# Patient Record
Sex: MALE | Race: OTHER | Employment: OTHER | URBAN - METROPOLITAN AREA
[De-identification: names, ages, dates, MRNs, and addresses within clinical notes are randomized per-mention and may not be internally consistent; named-entity substitution may affect disease eponyms.]

---

## 2018-11-20 ENCOUNTER — NURSE NAVIGATOR (OUTPATIENT)
Dept: FAMILY MEDICINE CLINIC | Age: 83
End: 2018-11-20

## 2018-11-20 DIAGNOSIS — M08.00 JUVENILE RHEUMATOID ARTHRITIS (HCC): Primary | ICD-10-CM

## 2018-11-20 DIAGNOSIS — S12.601S CLOSED NONDISPLACED FRACTURE OF SEVENTH CERVICAL VERTEBRA, UNSPECIFIED FRACTURE MORPHOLOGY, SEQUELA: ICD-10-CM

## 2018-11-20 DIAGNOSIS — J69.0 ASPIRATION PNEUMONIA DUE TO REGURGITATED FOOD, UNSPECIFIED LATERALITY, UNSPECIFIED PART OF LUNG (HCC): ICD-10-CM

## 2018-11-26 ENCOUNTER — NURSE NAVIGATOR (OUTPATIENT)
Dept: FAMILY MEDICINE CLINIC | Age: 83
End: 2018-11-26
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
## 2018-11-26 NOTE — PROGRESS NOTES
Home Based Primary Care & Supportive Services   (previously: At Home)  [phone] 4101 [facility] [place] 6, [address]    Pt's cousin and caregiver Vern Archer called asking when Yany Ratliff will be able to visit Mr. Roslyn Galicia. Pt was discharged from [facility] on 11/20/18 and was stable at that time. Ms. Regino Rodríguez says that over the weekend, pt has experienced urinary urgency. They called [facility] over the weekend and Alejandrina Fuentes is going to send a home health nurse to pt's home today. This nurse explained that Yany Ratliff team will be contacting her sometime this week to schedule pt's first Yany Ratliff visit. Nurse also explained [facility] services if pt needs to be seen before [facility] can schedule first home visit. Ms. Regino Rodríguez voices understanding.     Shereen Li, RN  Referral Navigator, Home Based Primary Care & Supportive Services

## 2018-11-27 ENCOUNTER — NURSE NAVIGATOR (OUTPATIENT)
Dept: PALLATIVE CARE | Age: 83
End: 2018-11-27

## 2018-11-27 NOTE — PROGRESS NOTES
Home Based Primary Care & Supportive Services   (previously: At Home)  [phone] 4101 [facility] 6, 0664 [address]    Received incoming call from [facility] at [facility]. She says she received home health orders from Dr. Deon Edwards to see Mr. Raisa Gates. She does not know what practice he works with, but she thinks that Dr. Ray Oglesby is going to be following Mr. Raisa Gates at home and therefore HBPC would be a duplication of providers. She gave this nurse Dr. Fleming Route contact number. [phone]. Nurse called Dr. Ray Oglesby and left message for him to call our office to clarify. Nurse called pt's caregiver and cousin Adi Delcid. She says Dr. Ray Oglesby made a home visit on 11/23/18 and he is the physician that ordered home health. She is not sure if he works for an agency or if he is in private practice. Nurse explained that she has left a message with Dr. Ray Oglesby to clarify if he plans to make home visits and function as pt's PCP. If so, HBPC will not be needed.     Juliano Aguilar, RN  Referral Navigator, Home Based Primary Care & Supportive Services
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
## 2018-12-03 ENCOUNTER — HOME HEALTH ADMISSION (OUTPATIENT)
Dept: HOME HEALTH SERVICES | Facility: HOME HEALTH | Age: 83
End: 2018-12-03
Payer: MEDICARE

## 2018-12-03 ENCOUNTER — TELEPHONE (OUTPATIENT)
Dept: FAMILY MEDICINE CLINIC | Age: 83
End: 2018-12-03

## 2018-12-03 ENCOUNTER — OFFICE VISIT (OUTPATIENT)
Dept: FAMILY MEDICINE CLINIC | Age: 83
End: 2018-12-03

## 2018-12-03 VITALS
SYSTOLIC BLOOD PRESSURE: 131 MMHG | HEART RATE: 77 BPM | RESPIRATION RATE: 16 BRPM | DIASTOLIC BLOOD PRESSURE: 76 MMHG | TEMPERATURE: 97.3 F | OXYGEN SATURATION: 96 %

## 2018-12-03 DIAGNOSIS — K59.00 CONSTIPATION, UNSPECIFIED CONSTIPATION TYPE: ICD-10-CM

## 2018-12-03 DIAGNOSIS — F32.A DEPRESSION, UNSPECIFIED DEPRESSION TYPE: ICD-10-CM

## 2018-12-03 DIAGNOSIS — R63.4 WEIGHT LOSS: ICD-10-CM

## 2018-12-03 DIAGNOSIS — M19.90 ARTHRITIS PAIN: ICD-10-CM

## 2018-12-03 DIAGNOSIS — M08.00 JUVENILE RHEUMATOID ARTHRITIS (HCC): Primary | ICD-10-CM

## 2018-12-03 PROBLEM — M06.9 RHEUMATOID ARTHRITIS INVOLVING MULTIPLE SITES (HCC): Status: ACTIVE | Noted: 2018-12-03

## 2018-12-03 RX ORDER — OXYCODONE HYDROCHLORIDE 10 MG/1
10 TABLET ORAL
COMMUNITY
End: 2018-12-13 | Stop reason: SDUPTHER

## 2018-12-03 RX ORDER — CYCLOBENZAPRINE HCL 5 MG
5 TABLET ORAL
COMMUNITY
End: 2018-12-13 | Stop reason: SDUPTHER

## 2018-12-03 RX ORDER — SENNOSIDES 8.6 MG/1
1 TABLET ORAL 2 TIMES DAILY
COMMUNITY
End: 2018-12-31 | Stop reason: SDUPTHER

## 2018-12-03 RX ORDER — POLYETHYLENE GLYCOL 3350 17 G/17G
17 POWDER, FOR SOLUTION ORAL DAILY
COMMUNITY

## 2018-12-03 RX ORDER — FACIAL-BODY WIPES
10 EACH TOPICAL
COMMUNITY

## 2018-12-03 RX ORDER — DULOXETIN HYDROCHLORIDE 30 MG/1
30 CAPSULE, DELAYED RELEASE ORAL DAILY
COMMUNITY
End: 2018-12-13 | Stop reason: SDUPTHER

## 2018-12-03 RX ORDER — MORPHINE SULFATE 15 MG/1
TABLET, FILM COATED, EXTENDED RELEASE ORAL EVERY 12 HOURS
COMMUNITY
End: 2018-12-13 | Stop reason: SDUPTHER

## 2018-12-03 RX ORDER — FINASTERIDE 5 MG/1
5 TABLET, FILM COATED ORAL DAILY
COMMUNITY
End: 2018-12-13 | Stop reason: SDUPTHER

## 2018-12-03 NOTE — ACP (ADVANCE CARE PLANNING)
Advance Care Planning (ACP) Provider Conversation Snapshot    Date of ACP Conversation: 12/03/18  Persons included in Conversation:  patient and POA  Length of ACP Conversation in minutes:  16 minutes    Authorized Decision Maker (if patient is incapable of making informed decisions):    This person is:   Healthcare Agent/Medical Power of  under Advance Directive          For Patients with Decision Making Capacity:   Values/Goals: Exploration of values, goals, and preferences if recovery is not expected, even with continued medical treatment in the event of:  Imminent death    Conversation Outcomes / Follow-Up Plan:   Entered DNR order (If yes, complete Durable DNR form)        Reviewed Advance Directive  Reviewed Durable DNR and completed 2 copies  Verified primary decision maker

## 2018-12-03 NOTE — TELEPHONE ENCOUNTER
Caller requesting a return call regarding pill box and the pills that are in there.  She thinks the pills might be wrong

## 2018-12-03 NOTE — PROGRESS NOTES
Oxy IR 10mg filled on 11/23/18 = 13 tabs MS contin 15mg filled on 11/23/18 = 36 tabs Right arm MUAC = 26cm
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
## 2018-12-04 ENCOUNTER — TELEPHONE (OUTPATIENT)
Dept: FAMILY MEDICINE CLINIC | Age: 83
End: 2018-12-04

## 2018-12-04 ENCOUNTER — HOME CARE VISIT (OUTPATIENT)
Dept: SCHEDULING | Facility: HOME HEALTH | Age: 83
End: 2018-12-04
Payer: MEDICARE

## 2018-12-04 PROCEDURE — 400013 HH SOC

## 2018-12-04 PROCEDURE — 3331090001 HH PPS REVENUE CREDIT

## 2018-12-04 PROCEDURE — 3331090002 HH PPS REVENUE DEBIT

## 2018-12-04 PROCEDURE — G0151 HHCP-SERV OF PT,EA 15 MIN: HCPCS

## 2018-12-04 NOTE — TELEPHONE ENCOUNTER
SW called pt's pcg/cousin Gisselle Bautista to schedule initial SW visit to the home.  Visit scheduled for Wednesday 12/5 @ 3pm
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
## 2018-12-05 ENCOUNTER — DOCUMENTATION ONLY (OUTPATIENT)
Dept: FAMILY MEDICINE CLINIC | Age: 83
End: 2018-12-05

## 2018-12-05 ENCOUNTER — TELEPHONE (OUTPATIENT)
Dept: FAMILY MEDICINE CLINIC | Age: 83
End: 2018-12-05

## 2018-12-05 ENCOUNTER — HOME CARE VISIT (OUTPATIENT)
Dept: HOME HEALTH SERVICES | Facility: HOME HEALTH | Age: 83
End: 2018-12-05
Payer: MEDICARE

## 2018-12-05 VITALS
RESPIRATION RATE: 16 BRPM | OXYGEN SATURATION: 98 % | DIASTOLIC BLOOD PRESSURE: 78 MMHG | SYSTOLIC BLOOD PRESSURE: 118 MMHG | HEART RATE: 83 BPM | TEMPERATURE: 97.8 F

## 2018-12-05 PROCEDURE — 3331090001 HH PPS REVENUE CREDIT

## 2018-12-05 PROCEDURE — 3331090002 HH PPS REVENUE DEBIT
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
## 2018-12-06 ENCOUNTER — HOME CARE VISIT (OUTPATIENT)
Dept: SCHEDULING | Facility: HOME HEALTH | Age: 83
End: 2018-12-06
Payer: MEDICARE

## 2018-12-06 VITALS
RESPIRATION RATE: 15 BRPM | HEART RATE: 80 BPM | SYSTOLIC BLOOD PRESSURE: 136 MMHG | OXYGEN SATURATION: 97 % | DIASTOLIC BLOOD PRESSURE: 82 MMHG

## 2018-12-06 VITALS
TEMPERATURE: 98 F | HEART RATE: 74 BPM | SYSTOLIC BLOOD PRESSURE: 123 MMHG | OXYGEN SATURATION: 94 % | DIASTOLIC BLOOD PRESSURE: 74 MMHG

## 2018-12-06 DIAGNOSIS — M06.09 RHEUMATOID ARTHRITIS OF MULTIPLE SITES WITH NEGATIVE RHEUMATOID FACTOR (HCC): Primary | ICD-10-CM

## 2018-12-06 PROCEDURE — 3331090002 HH PPS REVENUE DEBIT

## 2018-12-06 PROCEDURE — G0153 HHCP-SVS OF S/L PATH,EA 15MN: HCPCS

## 2018-12-06 PROCEDURE — G0157 HHC PT ASSISTANT EA 15: HCPCS

## 2018-12-06 PROCEDURE — 3331090001 HH PPS REVENUE CREDIT

## 2018-12-07 ENCOUNTER — TELEPHONE (OUTPATIENT)
Dept: FAMILY MEDICINE CLINIC | Age: 83
End: 2018-12-07

## 2018-12-07 ENCOUNTER — HOME CARE VISIT (OUTPATIENT)
Dept: SCHEDULING | Facility: HOME HEALTH | Age: 83
End: 2018-12-07
Payer: MEDICARE

## 2018-12-07 VITALS
SYSTOLIC BLOOD PRESSURE: 148 MMHG | DIASTOLIC BLOOD PRESSURE: 88 MMHG | HEART RATE: 95 BPM | RESPIRATION RATE: 15 BRPM | OXYGEN SATURATION: 93 %

## 2018-12-07 PROCEDURE — 3331090001 HH PPS REVENUE CREDIT

## 2018-12-07 PROCEDURE — G0157 HHC PT ASSISTANT EA 15: HCPCS

## 2018-12-07 PROCEDURE — 3331090002 HH PPS REVENUE DEBIT

## 2018-12-08 PROCEDURE — 3331090002 HH PPS REVENUE DEBIT

## 2018-12-08 PROCEDURE — 3331090001 HH PPS REVENUE CREDIT

## 2018-12-09 PROCEDURE — 3331090001 HH PPS REVENUE CREDIT

## 2018-12-09 PROCEDURE — 3331090002 HH PPS REVENUE DEBIT

## 2018-12-10 PROCEDURE — 3331090001 HH PPS REVENUE CREDIT

## 2018-12-10 PROCEDURE — 3331090002 HH PPS REVENUE DEBIT

## 2018-12-11 ENCOUNTER — TELEPHONE (OUTPATIENT)
Dept: FAMILY MEDICINE CLINIC | Age: 83
End: 2018-12-11

## 2018-12-11 ENCOUNTER — HOME CARE VISIT (OUTPATIENT)
Dept: SCHEDULING | Facility: HOME HEALTH | Age: 83
End: 2018-12-11
Payer: MEDICARE

## 2018-12-11 PROCEDURE — G0157 HHC PT ASSISTANT EA 15: HCPCS

## 2018-12-11 PROCEDURE — 3331090002 HH PPS REVENUE DEBIT

## 2018-12-11 PROCEDURE — 3331090001 HH PPS REVENUE CREDIT

## 2018-12-12 ENCOUNTER — HOME CARE VISIT (OUTPATIENT)
Dept: SCHEDULING | Facility: HOME HEALTH | Age: 83
End: 2018-12-12
Payer: MEDICARE

## 2018-12-12 VITALS
RESPIRATION RATE: 16 BRPM | OXYGEN SATURATION: 96 % | SYSTOLIC BLOOD PRESSURE: 125 MMHG | HEART RATE: 71 BPM | DIASTOLIC BLOOD PRESSURE: 73 MMHG | TEMPERATURE: 98.4 F

## 2018-12-12 PROCEDURE — 3331090001 HH PPS REVENUE CREDIT

## 2018-12-12 PROCEDURE — 3331090002 HH PPS REVENUE DEBIT

## 2018-12-12 PROCEDURE — G0153 HHCP-SVS OF S/L PATH,EA 15MN: HCPCS

## 2018-12-13 ENCOUNTER — HOME CARE VISIT (OUTPATIENT)
Dept: SCHEDULING | Facility: HOME HEALTH | Age: 83
End: 2018-12-13
Payer: MEDICARE

## 2018-12-13 ENCOUNTER — HOME VISIT (OUTPATIENT)
Dept: FAMILY MEDICINE CLINIC | Age: 83
End: 2018-12-13

## 2018-12-13 VITALS
HEART RATE: 91 BPM | RESPIRATION RATE: 16 BRPM | SYSTOLIC BLOOD PRESSURE: 137 MMHG | OXYGEN SATURATION: 96 % | DIASTOLIC BLOOD PRESSURE: 81 MMHG | TEMPERATURE: 95.7 F

## 2018-12-13 DIAGNOSIS — Z13.39 SCREENING FOR ALCOHOLISM: ICD-10-CM

## 2018-12-13 DIAGNOSIS — Z13.31 SCREENING FOR DEPRESSION: ICD-10-CM

## 2018-12-13 DIAGNOSIS — Z00.00 INITIAL MEDICARE ANNUAL WELLNESS VISIT: ICD-10-CM

## 2018-12-13 DIAGNOSIS — I10 ESSENTIAL HYPERTENSION: ICD-10-CM

## 2018-12-13 DIAGNOSIS — M06.09 RHEUMATOID ARTHRITIS OF MULTIPLE SITES WITH NEGATIVE RHEUMATOID FACTOR (HCC): Primary | ICD-10-CM

## 2018-12-13 PROCEDURE — 3331090002 HH PPS REVENUE DEBIT

## 2018-12-13 PROCEDURE — G0157 HHC PT ASSISTANT EA 15: HCPCS

## 2018-12-13 PROCEDURE — 3331090001 HH PPS REVENUE CREDIT

## 2018-12-13 RX ORDER — DULOXETIN HYDROCHLORIDE 60 MG/1
60 CAPSULE, DELAYED RELEASE ORAL DAILY
Qty: 90 CAP | Refills: 3 | Status: SHIPPED | OUTPATIENT
Start: 2018-12-13 | End: 2019-03-04 | Stop reason: SDUPTHER

## 2018-12-13 RX ORDER — MORPHINE SULFATE 15 MG/1
15 TABLET, FILM COATED, EXTENDED RELEASE ORAL
Qty: 30 TAB | Refills: 0
Start: 2018-12-28 | End: 2019-01-08 | Stop reason: SDUPTHER

## 2018-12-13 RX ORDER — FINASTERIDE 5 MG/1
5 TABLET, FILM COATED ORAL DAILY
Qty: 90 TAB | Refills: 3 | Status: SHIPPED | OUTPATIENT
Start: 2018-12-13

## 2018-12-13 RX ORDER — CYCLOBENZAPRINE HCL 5 MG
5 TABLET ORAL
Qty: 90 TAB | Refills: 0 | Status: SHIPPED | OUTPATIENT
Start: 2018-12-13

## 2018-12-13 RX ORDER — AMOXICILLIN 250 MG
2 CAPSULE ORAL 2 TIMES DAILY
Qty: 180 TAB | Refills: 3 | Status: SHIPPED | OUTPATIENT
Start: 2018-12-13

## 2018-12-13 RX ORDER — OXYCODONE HYDROCHLORIDE 10 MG/1
TABLET ORAL
Qty: 120 TAB | Refills: 0
Start: 2018-12-18 | End: 2019-02-18 | Stop reason: SDUPTHER

## 2018-12-13 NOTE — PATIENT INSTRUCTIONS
Dear Adelaida Bronson ,    It was a pleasure seeing you at home today with [facility] ([phone])    Your stated goal: To get out of bed again    This is what we talked about:     1. Depression  -Your mood is starting to improve but still has room for improvement  -Cymbalta will be increased to 60mg daily    2. Pain; neck and arthritis  -The updated pain regimen is working for you  - You are currently receiving scheduled Morphine ER 15mg at night and scheduled Oxycodone IR 10mg in the morning.  - You use little of the \"as needed\" doses of the Oxycodone  - We will keep your regimen the same at this time  - Refills were left in the home    3. Constipation  -Also, you have been having trouble with constipation  - Today you had a large bowel movement that tired you out  - Take Pericolace 2 tabs twice a day  - You may also use Miralax every third day if now bowel movement by then    4. Health Maintenance  - Mickle Buerger will be contacted to provide your Pneumonia and Influenza vaccine    Health Maintenance Due   Topic Date Due    Pneumococcal 65+ Low/Medium Risk (1 of 2 - PCV13) 03/06/2000    Influenza Age 9 to Adult  08/01/2018     5. Advance Care Planning   This is what you have shared with us about Advance Care Planning    Primary Decision [address] ([address]): Artemio Eloy  Relationship to patient: [phone]  Phone number:  [x] Named in a scanned document   [] Legal Next of Kin  [] Guardian    ACP documents you current have include:  [x] Advance Directive or Living Will  [x] Durable Do Not Resuscitate  [] Physician Orders for Scope of Treatment (POST)  [] Medical Power of   [] Other    Someone from the [facility] Team will see you again in 4-weeks. If there are any concerns before that time, such as medication questions, worsening symptoms or a need to see a physician for an urgent or emergent situation; please call [phone].  A physician is also on call after our normal business hours of 8am to 5pm.     In order to serve you better, please allow up to 48 hours for prescription refills to be processed. Certain medications may require more paperwork or a written prescription that you may need to  from the office. We appreciate you letting us know of any refill requests as soon as possible. Sincerely,    [facility]    MD Salome Olsen, ELENITA Barahona, GERARD Weiss RN  Medicare Wellness Visit, Male    The best way to live healthy is to have a lifestyle where you eat a well-balanced diet, exercise regularly, limit alcohol use, and quit all forms of tobacco/nicotine, if applicable. Regular preventive services are another way to keep healthy. Preventive services (vaccines, screening tests, monitoring & exams) can help personalize your care plan, which helps you manage your own care. Screening tests can find health problems at the earliest stages, when they are easiest to treat. [facility] follows the current, evidence-based guidelines published by the [facility] States Daryl Shell ([facility]) when recommending preventive services for our patients. Because we follow these guidelines, sometimes recommendations change over time as research supports it. (For example, a prostate screening blood test is no longer routinely recommended for men with no symptoms.)  Of course, you and your doctor may decide to screen more often for some diseases, based on your risk and co-morbidities (chronic disease you are already diagnosed with). Preventive services for you include:  - Medicare offers their members a free annual wellness visit, which is time for you and your primary care provider to discuss and plan for your preventive service needs. Take advantage of this benefit every year!  -All adults over age 72 should receive the recommended pneumonia vaccines.  Current USPSTF guidelines recommend a series of two vaccines for the best pneumonia protection.   -All adults should have a flu vaccine yearly and an ECG. All adults age 61 and older should receive a shingles vaccine once in their lifetime.    -All adults age 38-68 who are overweight should have a diabetes screening test once every three years.   -Other screening tests & preventive services for persons with diabetes include: an eye exam to screen for diabetic retinopathy, a kidney function test, a foot exam, and stricter control over your cholesterol.   -Cardiovascular screening for adults with routine risk involves an electrocardiogram (ECG) at intervals determined by the provider.   -Colorectal cancer screening should be done for adults age 54-65 with no increased risk factors for colorectal cancer. There are a number of acceptable methods of screening for this type of cancer. Each test has its own benefits and drawbacks. Discuss with your provider what is most appropriate for you during your annual wellness visit. The different tests include: colonoscopy (considered the best screening method), a fecal occult blood test, a fecal DNA test, and sigmoidoscopy.  -All adults born between [facility] should be screened once for Hepatitis C.  -An Abdominal Aortic Aneurysm (AAA) Screening is recommended for men age 73-68 who has ever smoked in their lifetime.      Here is a list of your current Health Maintenance items (your personalized list of preventive services) with a due date:  Health Maintenance Due   Topic Date Due    Pneumococcal Vaccine (1 of 2 - PCV13) 03/06/2000    Flu Vaccine  08/01/2018

## 2018-12-13 NOTE — PROGRESS NOTES
This is an Initial Medicare Annual Wellness Exam (AWV) (Performed 12 months after IPPE or effective date of Medicare Part B enrollment, Once in a lifetime)    I have reviewed the patient's medical history in detail and updated the computerized patient record. History   No past medical history on file. No past surgical history on file. Current Outpatient Medications   Medication Sig Dispense Refill    DULoxetine (CYMBALTA) 30 mg capsule Take 30 mg by mouth daily.  senna (SENNA) 8.6 mg tablet Take 1 Tab by mouth two (2) times a day.  oxyCODONE IR (ROXICODONE) 10 mg tab immediate release tablet Take 10 mg by mouth every four (4) hours as needed.  cyclobenzaprine (FLEXERIL) 5 mg tablet Take 5 mg by mouth three (3) times daily as needed for Muscle Spasm(s).  morphine CR (MS CONTIN) 15 mg CR tablet Take  by mouth every twelve (12) hours.  finasteride (PROSCAR) 5 mg tablet Take 5 mg by mouth daily.  polyethylene glycol (MIRALAX) 17 gram/dose powder Take 17 g by mouth daily.  bisacodyl (DULCOLAX, BISACODYL,) 10 mg suppository Insert 10 mg into rectum daily as needed. Not on File  No family history on file.   Social History     Tobacco Use    Smoking status: Unknown If Ever Smoked    Smokeless tobacco: Never Used   Substance Use Topics    Alcohol use: No     Frequency: Never     Patient Active Problem List   Diagnosis Code    Rhe[facility]oid arthritis involving multiple sites ([facility] M06.9       Depression Risk Factor Screening:     PHQ over the last two weeks 12/13/2018   Little interest or pleasure in doing things Several days   Feeling down, depressed, irritable, or hopeless Several days   Total Score PHQ 2 2   Trouble falling or staying asleep, or sleeping too much -   Feeling tired or having little energy -   Poor appetite, weight loss, or overeating -   Feeling bad about yourself - or that you are a failure or have let yourself or your family down -   Trouble concentrating on things such as school, work, reading, or watching TV -   Moving or speaking so slowly that other people could have noticed; or the opposite being so fidgety that others notice -   Thoughts of being better off dead, or hurting yourself in some way -   PHQ 9 Score -   How difficult have these problems made it for you to do your work, take care of your home and get along with others -     Alcohol Risk Factor Screening: You do not drink alcohol or very rarely. Functional Ability and Level of Safety:     Hearing Loss  Hearing is good. Activities of Daily Living  The home contains: handrails and grab bars  Requires assistance with ADL's    Fall Risk  Fall Risk Assessment, last 12 mths 12/13/2018   Able to walk? No       Abuse Screen  Patient is not abused    Cognitive Screening   Evaluation of Cognitive Function:  Has your family/caregiver stated any concerns about your memory: yes  Abnormal- variable memory    Patient Care Team   Patient Care Team:  Dulce Malin NP as PCP - General (Nurse Practitioner)  Chirag Savage MD as Physician (Palliative Medicine)    Assessment/Plan   Education and counseling provided:  Are appropriate based on today's review and evaluation    Diagnoses and all orders for this visit:    1. Initial Medicare annual wellness visit    2.  Screening for alcoholism  -     [place] ANNUAL ALCOHOL SCREEN 15 MIN    3. Screening for depression  -     [address] Maintenance Due   Topic Date Due    DTaP/Tdap/Td series (1 - Tdap) 03/06/1956    Shingrix Vaccine Age 50> (1 of 2) 03/06/1985    GLAUCOMA SCREENING Q2Y  03/06/2000    Pneumococcal 65+ Low/Medium Risk (1 of 2 - PCV13) 03/06/2000    Influenza Age 9 to Adult  08/01/2018    MEDICARE YEARLY EXAM  11/27/2018

## 2018-12-13 NOTE — PROGRESS NOTES
Home Based [address] [facility]   [phone]      Date of Current Visit: 12/13/18     SUMMARY:   For full summary of patient's condition, please see Home Based Primary Care initial visit note on 12/3/18. This patient's chronic conditions including Juvenile rheumatoid arthritis with bed confinement have resulted in the inability to leave the home to receive primary medical care without a significant taxing effort. Today we are visiting the patient for the following reason WELL VISIT. GOALS OF CARE / TREATMENT PREFERENCES:   GOALS OF CARE:  Patient / health care proxy stated goals: To get out of bed again    TREATMENT PREFERENCES:   Code Status:  [] Attempt Resuscitation       [x] Do Not Attempt Resuscitation    Advance Care Planning   This is what you have shared with us about Advance Care Planning  Primary Decision Maker ([address]): Kevin Golden  Relationship to patient: Cousin  Phone number: [phone]  [x] Named in a scanned document (to be scanned)  [] Legal Next of Kin  [] Guardian    ACP documents you current have include:  [x] Advance Directive or Living Will  [x] Durable Do Not Resuscitate  [] Physician Orders for Scope of Treatment (POST)  [] Medical Power of   [] Other     DIAGNOSES & PLAN:       ICD-10-CM ICD-9-CM    1. 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 Initial Medicare annual wellness visit Z00.00 V70.0 CBC W/O DIFF      METABOLIC PANEL, COMPREHENSIVE   3. Screening for alcoholism Z13.39 V79.1 NC ANNUAL ALCOHOL SCREEN 15 MIN      CBC W/O DIFF      METABOLIC PANEL, COMPREHENSIVE   4. Screening for depression Z13.31 V79.0 DEPRESSION SCREEN ANNUAL      CBC W/O DIFF      METABOLIC PANEL, COMPREHENSIVE   5.  Essential hypertension I10 401.9 CBC W/O DIFF      METABOLIC PANEL, COMPREHENSIVE       Patient Instructions     Dear Viviana Manual ,    It was a pleasure seeing you at home today with Home Based Primary Care ([phone])    Your stated goal: To get out of bed again    This is what we talked about:     1. Depression  -Your mood is starting to improve but still has room for improvement  -Cymbalta will be increased to 60mg daily    2. Pain; neck and arthritis  -The updated pain regimen is working for you  - You are currently receiving scheduled Morphine ER 15mg at night and scheduled Oxycodone IR 10mg in the morning.  - You use little of the \"as needed\" doses of the Oxycodone  - We will keep your regimen the same at this time  - Refills were left in the home    3. Constipation  -Also, you have been having trouble with constipation  - Today you had a large bowel movement that tired you out  - Take Pericolace 2 tabs twice a day  - You may also use Miralax every third day if now bowel movement by then    4. Health Maintenance  - Jet Macdonald will be contacted to provide your Pneumonia and Influenza vaccine    Health Maintenance Due   Topic Date Due    Pneumococcal 65+ Low/Medium Risk (1 of 2 - PCV13) 03/06/2000    Influenza Age 9 to Adult  08/01/2018     5. Advance Care Planning   This is what you have shared with us about Advance Care Planning    Primary Decision [address] ([address]): Tye Crawford  Relationship to patient: [phone]  Phone number:  [x] Named in a scanned document   [] Legal Next of Kin  [] Guardian    ACP documents you current have include:  [x] Advance Directive or Living Will  [x] Durable Do Not Resuscitate  [] Physician Orders for Scope of Treatment (POST)  [] Medical Power of   [] Other    Someone from the Home Based Primary Care Team will see you again in 4-weeks.     If there are any concerns before that time, such as medication questions, worsening symptoms or a need to see a physician for an urgent or emergent situation; please call [phone]. A physician is also on call after our normal business hours of 8am to 5pm.     In order to serve you better, please allow up to 48 hours for prescription refills to be processed. Certain medications may require more paperwork or a written prescription that you may need to  from the office. We appreciate you letting us know of any refill requests as soon as possible. Sincerely,    The Home Based Primary Care Team    MD Burgess Anand Blanchard NP Cathlene Hai, NP Antonette Meres, GERARD Connelly, RN  Medicare Wellness Visit, Male    The best way to live healthy is to have a lifestyle where you eat a well-balanced diet, exercise regularly, limit alcohol use, and quit all forms of tobacco/nicotine, if applicable. Regular preventive services are another way to keep healthy. Preventive services (vaccines, screening tests, monitoring & exams) can help personalize your care plan, which helps you manage your own care. Screening tests can find health problems at the earliest stages, when they are easiest to treat. [facility] follows the current, evidence-based guidelines published by the [facility] States Daryl Suleman (USPSTF) when recommending preventive services for our patients. Because we follow these guidelines, sometimes recommendations change over time as research supports it. (For example, a prostate screening blood test is no longer routinely recommended for men with no symptoms.)  Of course, you and your doctor may decide to screen more often for some diseases, based on your risk and co-morbidities (chronic disease you are already diagnosed with). Preventive services for you include:  - Medicare offers their members a free annual wellness visit, which is time for you and your primary care provider to discuss and plan for your preventive service needs.  Take advantage of this benefit every year!  -All adults over age 72 should receive the recommended pneumonia vaccines. Current USPSTF guidelines recommend a series of two vaccines for the best pneumonia protection.   -All adults should have a flu vaccine yearly and an ECG. All adults age 61 and older should receive a shingles vaccine once in their lifetime.    -All adults age 38-68 who are overweight should have a diabetes screening test once every three years.   -Other screening tests & preventive services for persons with diabetes include: an eye exam to screen for diabetic retinopathy, a kidney function test, a foot exam, and stricter control over your cholesterol.   -Cardiovascular screening for adults with routine risk involves an electrocardiogram (ECG) at intervals determined by the provider.   -Colorectal cancer screening should be done for adults age 54-65 with no increased risk factors for colorectal cancer. There are a number of acceptable methods of screening for this type of cancer. Each test has its own benefits and drawbacks. Discuss with your provider what is most appropriate for you during your annual wellness visit. The different tests include: colonoscopy (considered the best screening method), a fecal occult blood test, a fecal DNA test, and sigmoidoscopy.  -All adults born between [facility] should be screened once for Hepatitis C.  -An Abdominal Aortic Aneurysm (AAA) Screening is recommended for men age 73-68 who has ever smoked in their lifetime. Here is a list of your current Health Maintenance items (your personalized list of preventive services) with a due date:  Health Maintenance Due   Topic Date Due    Pneumococcal Vaccine (1 of 2 - PCV13) 03/06/2000    Flu Vaccine  08/01/2018        HISTORY:   HISTORY OBTAINED FROM:     CHIEF COMPLAINT:   Chief Complaint   Patient presents with    Annual Wellness Visit       HPI/SUBJECTIVE:     Pain controlled. Had large BM today but continues to have trouble with constipation.  Mood slightly improved. Recent Fall: [x] No / [] Yes (when):    Last bowel movement:  yes    REVIEW OF SYSTEMS:     The following systems were [x] reviewed / [] unable to be reviewed  Systems: constitutional, ears/nose/mouth/throat, respiratory, gastrointestinal, genitourinary, musculoskeletal, integumentary, neurologic, psychiatric, endocrine. Positive findings noted below: constipation, chronic pain     VITAL SIGNS, PHYSICAL EXAM & FUNCTIONAL ASSESSMENT     Vital Signs: Wt Readings from Last 3 Encounters:   No data found for Wt     Temp Readings from Last 3 Encounters:   12/13/18 95.7 °F (35.4 °C) (Axillary)   12/12/18 98.4 °F (36.9 °C) (Temporal)   12/11/18 97.2 °F (36.2 °C)     BP Readings from Last 3 Encounters:   12/13/18 137/81   12/12/18 125/73   12/11/18 150/70     Pulse Readings from Last 3 Encounters:   12/13/18 91   12/12/18 71   12/11/18 71       Physical Exam:    Constitutional: cacuasian male, sitting up in hospital bed, NAD  Eyes: pupils equal, anicteric  ENMT: EAC's cleal bilat, no nasal discharge, moist mucous membranes  Cardiovascular: regular rhythm, distal pulses intact  Respiratory: breathing not labored, symmetric, CTA  Gastrointestinal: soft non-tender, +bowel sounds  Musculoskeletal:  no tenderness to palpation  Skin: warm, dry, no open wounds, rashes or ulcerations  Neurologic: following commands, moving all extremities  Psychiatric: normal affect, no hallucinations  Other:    Morphine ER 15mg =#27remaining, filled 11/23/18  Oxy IR 10mg = #22 remaining , filled 11/23/18    Functional Assessment (description):    Palliative Performance Scale:   Timed Up and Go (TUG):   Fall Risk Assessment, last 12 mths 12/13/2018   Able to walk?  No        LAB DATA REVIEWED:     No results found for: HBA1C, HGBE8, VJX2SPBW, YIQ5IKAR, OQI4QGWI  No results found for: MCACR, MCA1, MCA2, MCA3, MCAU, MCAU2, MCALPOCT  No results found for: TSH, TSH2, TSH3, TSHP, TSHEXT, TSHEXT  No results found for: Mook Murphy, Sonia Both, XQVID, VD3RIA      No results found for: WBC, HGB, PLT, HGBEXT, PLTEXT, HGBEXT, PLTEXT  No results found for: NA, K, CL, CO2, BUN, CREA, CA, MG, PHOS   No results found for: SGOT, GPT, AP, TBIL, TP, ALB, GLOB, GGT  No results found for: IRON, FE, TIBC, IBCT, PSAT, FERR      MEDICATIONS & PRESCRIPTIONS     Not on File   Current Outpatient Medications   Medication Sig    [START ON 12/28/2018] morphine CR (MS CONTIN) 15 mg CR tablet Take 1 Tab by mouth nightly. Max Daily Amount: 15 mg.    DULoxetine (CYMBALTA) 60 mg capsule Take 1 Cap by mouth daily.  [START ON 12/18/2018] oxyCODONE IR (ROXICODONE) 10 mg tab immediate release tablet 10MG PO QAM THEN Q4H PRN PAIN    finasteride (PROSCAR) 5 mg tablet Take 1 Tab by mouth daily.  senna-docusate (PERICOLACE) 8.6-50 mg per tablet Take 2 Tabs by mouth two (2) times a day.  cyclobenzaprine (FLEXERIL) 5 mg tablet Take 1 Tab by mouth three (3) times daily as needed for Muscle Spasm(s).  senna (SENNA) 8.6 mg tablet Take 1 Tab by mouth two (2) times a day.  polyethylene glycol (MIRALAX) 17 gram/dose powder Take 17 g by mouth daily.  bisacodyl (DULCOLAX, BISACODYL,) 10 mg suppository Insert 10 mg into rectum daily as needed. No current facility-administered medications for this visit. Medications Ordered Today   Medications    morphine CR (MS CONTIN) 15 mg CR tablet     Sig: Take 1 Tab by mouth nightly. Max Daily Amount: 15 mg. Dispense:  30 Tab     Refill:  0    DULoxetine (CYMBALTA) 60 mg capsule     Sig: Take 1 Cap by mouth daily. Dispense:  90 Cap     Refill:  3    oxyCODONE IR (ROXICODONE) 10 mg tab immediate release tablet     Sig: 10MG PO QAM THEN Q4H PRN PAIN     Dispense:  120 Tab     Refill:  0    finasteride (PROSCAR) 5 mg tablet     Sig: Take 1 Tab by mouth daily. Dispense:  90 Tab     Refill:  3    senna-docusate (PERICOLACE) 8.6-50 mg per tablet     Sig: Take 2 Tabs by mouth two (2) times a day.      Dispense:  180 Tab     Refill:  3    cyclobenzaprine (FLEXERIL) 5 mg tablet     Sig: Take 1 Tab by mouth three (3) times daily as needed for Muscle Spasm(s). Dispense:  90 Tab     Refill:  0        DME: We will request a [employer] lift to assist with safe  transfers to and from bed to wheel chair , without this lift you would be completely bed confined.        Total time: 40min  Counseling / coordination time: 5min  > 50% counseling / coordination?:

## 2018-12-14 ENCOUNTER — HOME CARE VISIT (OUTPATIENT)
Dept: SCHEDULING | Facility: HOME HEALTH | Age: 83
End: 2018-12-14
Payer: MEDICARE

## 2018-12-14 ENCOUNTER — DOCUMENTATION ONLY (OUTPATIENT)
Dept: FAMILY MEDICINE CLINIC | Age: 83
End: 2018-12-14

## 2018-12-14 VITALS
DIASTOLIC BLOOD PRESSURE: 80 MMHG | SYSTOLIC BLOOD PRESSURE: 122 MMHG | RESPIRATION RATE: 15 BRPM | OXYGEN SATURATION: 96 % | HEART RATE: 92 BPM

## 2018-12-14 VITALS
RESPIRATION RATE: 16 BRPM | OXYGEN SATURATION: 97 % | TEMPERATURE: 97.2 F | SYSTOLIC BLOOD PRESSURE: 150 MMHG | HEART RATE: 71 BPM | DIASTOLIC BLOOD PRESSURE: 70 MMHG

## 2018-12-14 VITALS
RESPIRATION RATE: 15 BRPM | SYSTOLIC BLOOD PRESSURE: 126 MMHG | HEART RATE: 65 BPM | DIASTOLIC BLOOD PRESSURE: 60 MMHG | OXYGEN SATURATION: 98 %

## 2018-12-14 VITALS
SYSTOLIC BLOOD PRESSURE: 129 MMHG | OXYGEN SATURATION: 96 % | HEART RATE: 76 BPM | RESPIRATION RATE: 16 BRPM | TEMPERATURE: 98.2 F | DIASTOLIC BLOOD PRESSURE: 68 MMHG

## 2018-12-14 LAB
ALBUMIN SERPL-MCNC: 3.2 G/DL (ref 3.5–4.7)
ALBUMIN/GLOB SERPL: 1.1 {RATIO} (ref 1.2–2.2)
ALP SERPL-CCNC: 70 IU/L (ref 39–117)
ALT SERPL-CCNC: 8 IU/L (ref 0–44)
AST SERPL-CCNC: 13 IU/L (ref 0–40)
BILIRUB SERPL-MCNC: 0.3 MG/DL (ref 0–1.2)
BUN SERPL-MCNC: 4 MG/DL (ref 8–27)
BUN/CREAT SERPL: 9 (ref 10–24)
CALCIUM SERPL-MCNC: 8.7 MG/DL (ref 8.6–10.2)
CHLORIDE SERPL-SCNC: 100 MMOL/L (ref 96–106)
CO2 SERPL-SCNC: 23 MMOL/L (ref 20–29)
CREAT SERPL-MCNC: 0.43 MG/DL (ref 0.76–1.27)
ERYTHROCYTE [DISTWIDTH] IN BLOOD BY AUTOMATED COUNT: 14.4 % (ref 12.3–15.4)
GLOBULIN SER CALC-MCNC: 2.9 G/DL (ref 1.5–4.5)
GLUCOSE SERPL-MCNC: 114 MG/DL (ref 65–99)
HCT VFR BLD AUTO: 39.4 % (ref 37.5–51)
HGB BLD-MCNC: 13 G/DL (ref 13–17.7)
MCH RBC QN AUTO: 28.9 PG (ref 26.6–33)
MCHC RBC AUTO-ENTMCNC: 33 G/DL (ref 31.5–35.7)
MCV RBC AUTO: 88 FL (ref 79–97)
PLATELET # BLD AUTO: 336 X10E3/UL (ref 150–379)
POTASSIUM SERPL-SCNC: 4.5 MMOL/L (ref 3.5–5.2)
PROT SERPL-MCNC: 6.1 G/DL (ref 6–8.5)
RBC # BLD AUTO: 4.5 X10E6/UL (ref 4.14–5.8)
SODIUM SERPL-SCNC: 136 MMOL/L (ref 134–144)
WBC # BLD AUTO: 8.4 X10E3/UL (ref 3.4–10.8)

## 2018-12-14 PROCEDURE — 3331090001 HH PPS REVENUE CREDIT

## 2018-12-14 PROCEDURE — G0153 HHCP-SVS OF S/L PATH,EA 15MN: HCPCS

## 2018-12-14 PROCEDURE — 3331090002 HH PPS REVENUE DEBIT

## 2018-12-14 PROCEDURE — G0157 HHC PT ASSISTANT EA 15: HCPCS
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
## 2018-12-15 PROCEDURE — 3331090002 HH PPS REVENUE DEBIT

## 2018-12-15 PROCEDURE — 3331090001 HH PPS REVENUE CREDIT

## 2018-12-16 PROCEDURE — 3331090001 HH PPS REVENUE CREDIT

## 2018-12-16 PROCEDURE — 3331090002 HH PPS REVENUE DEBIT

## 2018-12-17 ENCOUNTER — HOME CARE VISIT (OUTPATIENT)
Dept: SCHEDULING | Facility: HOME HEALTH | Age: 83
End: 2018-12-17
Payer: MEDICARE

## 2018-12-17 ENCOUNTER — HOME CARE VISIT (OUTPATIENT)
Dept: HOME HEALTH SERVICES | Facility: HOME HEALTH | Age: 83
End: 2018-12-17
Payer: MEDICARE

## 2018-12-17 VITALS
OXYGEN SATURATION: 95 % | RESPIRATION RATE: 15 BRPM | DIASTOLIC BLOOD PRESSURE: 68 MMHG | SYSTOLIC BLOOD PRESSURE: 135 MMHG | HEART RATE: 72 BPM

## 2018-12-17 PROCEDURE — 3331090001 HH PPS REVENUE CREDIT

## 2018-12-17 PROCEDURE — 3331090002 HH PPS REVENUE DEBIT

## 2018-12-17 PROCEDURE — G0157 HHC PT ASSISTANT EA 15: HCPCS

## 2018-12-18 ENCOUNTER — HOME CARE VISIT (OUTPATIENT)
Dept: HOME HEALTH SERVICES | Facility: HOME HEALTH | Age: 83
End: 2018-12-18
Payer: MEDICARE

## 2018-12-18 ENCOUNTER — TELEPHONE (OUTPATIENT)
Dept: PALLATIVE CARE | Age: 83
End: 2018-12-18

## 2018-12-18 VITALS
OXYGEN SATURATION: 98 % | HEART RATE: 78 BPM | SYSTOLIC BLOOD PRESSURE: 138 MMHG | DIASTOLIC BLOOD PRESSURE: 74 MMHG | TEMPERATURE: 97 F | RESPIRATION RATE: 16 BRPM

## 2018-12-18 PROCEDURE — G0153 HHCP-SVS OF S/L PATH,EA 15MN: HCPCS

## 2018-12-18 PROCEDURE — 3331090001 HH PPS REVENUE CREDIT

## 2018-12-18 PROCEDURE — 3331090002 HH PPS REVENUE DEBIT

## 2018-12-18 NOTE — TELEPHONE ENCOUNTER
Informed Nydia Mallory that [address] [place] [address] does not provide diapers or pads, please call back with any questions.

## 2018-12-19 ENCOUNTER — HOME CARE VISIT (OUTPATIENT)
Dept: SCHEDULING | Facility: HOME HEALTH | Age: 83
End: 2018-12-19
Payer: MEDICARE

## 2018-12-19 VITALS
RESPIRATION RATE: 15 BRPM | DIASTOLIC BLOOD PRESSURE: 66 MMHG | HEART RATE: 78 BPM | OXYGEN SATURATION: 96 % | SYSTOLIC BLOOD PRESSURE: 120 MMHG

## 2018-12-19 PROCEDURE — 3331090001 HH PPS REVENUE CREDIT

## 2018-12-19 PROCEDURE — G0157 HHC PT ASSISTANT EA 15: HCPCS

## 2018-12-19 PROCEDURE — 3331090002 HH PPS REVENUE DEBIT

## 2018-12-20 PROCEDURE — 3331090001 HH PPS REVENUE CREDIT

## 2018-12-20 PROCEDURE — 3331090002 HH PPS REVENUE DEBIT

## 2018-12-21 ENCOUNTER — TELEPHONE (OUTPATIENT)
Dept: FAMILY MEDICINE CLINIC | Age: 83
End: 2018-12-21

## 2018-12-21 ENCOUNTER — HOME CARE VISIT (OUTPATIENT)
Dept: SCHEDULING | Facility: HOME HEALTH | Age: 83
End: 2018-12-21
Payer: MEDICARE

## 2018-12-21 PROCEDURE — G0157 HHC PT ASSISTANT EA 15: HCPCS

## 2018-12-21 PROCEDURE — 3331090002 HH PPS REVENUE DEBIT

## 2018-12-21 PROCEDURE — 3331090001 HH PPS REVENUE CREDIT

## 2018-12-21 NOTE — TELEPHONE ENCOUNTER
Wilmar Potter lift was not ordered. Let Julian know we will start the process to order and get it to patient as soon as possible.

## 2018-12-22 VITALS
DIASTOLIC BLOOD PRESSURE: 70 MMHG | HEART RATE: 91 BPM | SYSTOLIC BLOOD PRESSURE: 128 MMHG | OXYGEN SATURATION: 94 % | RESPIRATION RATE: 16 BRPM

## 2018-12-22 PROCEDURE — 3331090001 HH PPS REVENUE CREDIT

## 2018-12-22 PROCEDURE — 3331090002 HH PPS REVENUE DEBIT

## 2018-12-23 PROCEDURE — 3331090001 HH PPS REVENUE CREDIT

## 2018-12-23 PROCEDURE — 3331090002 HH PPS REVENUE DEBIT

## 2018-12-24 PROCEDURE — 3331090001 HH PPS REVENUE CREDIT

## 2018-12-24 PROCEDURE — 3331090002 HH PPS REVENUE DEBIT

## 2018-12-25 PROCEDURE — 3331090001 HH PPS REVENUE CREDIT

## 2018-12-25 PROCEDURE — 3331090002 HH PPS REVENUE DEBIT

## 2018-12-26 ENCOUNTER — DOCUMENTATION ONLY (OUTPATIENT)
Dept: FAMILY MEDICINE CLINIC | Age: 83
End: 2018-12-26

## 2018-12-26 ENCOUNTER — HOME CARE VISIT (OUTPATIENT)
Dept: SCHEDULING | Facility: HOME HEALTH | Age: 83
End: 2018-12-26
Payer: MEDICARE

## 2018-12-26 VITALS
DIASTOLIC BLOOD PRESSURE: 68 MMHG | RESPIRATION RATE: 16 BRPM | OXYGEN SATURATION: 94 % | HEART RATE: 83 BPM | SYSTOLIC BLOOD PRESSURE: 140 MMHG

## 2018-12-26 DIAGNOSIS — M06.9 RHEUMATOID ARTHRITIS INVOLVING MULTIPLE SITES, UNSPECIFIED RHEUMATOID FACTOR PRESENCE: Primary | ICD-10-CM

## 2018-12-26 PROCEDURE — 3331090001 HH PPS REVENUE CREDIT

## 2018-12-26 PROCEDURE — G0157 HHC PT ASSISTANT EA 15: HCPCS

## 2018-12-26 PROCEDURE — 3331090002 HH PPS REVENUE DEBIT

## 2018-12-27 ENCOUNTER — HOME CARE VISIT (OUTPATIENT)
Dept: SCHEDULING | Facility: HOME HEALTH | Age: 83
End: 2018-12-27
Payer: MEDICARE

## 2018-12-27 VITALS
HEART RATE: 72 BPM | RESPIRATION RATE: 16 BRPM | TEMPERATURE: 97.8 F | SYSTOLIC BLOOD PRESSURE: 150 MMHG | OXYGEN SATURATION: 97 % | DIASTOLIC BLOOD PRESSURE: 80 MMHG

## 2018-12-27 PROCEDURE — G0151 HHCP-SERV OF PT,EA 15 MIN: HCPCS

## 2018-12-27 PROCEDURE — 3331090001 HH PPS REVENUE CREDIT

## 2018-12-27 PROCEDURE — 3331090002 HH PPS REVENUE DEBIT

## 2018-12-28 ENCOUNTER — TELEPHONE (OUTPATIENT)
Dept: FAMILY MEDICINE CLINIC | Age: 83
End: 2018-12-28

## 2018-12-28 PROCEDURE — 3331090002 HH PPS REVENUE DEBIT

## 2018-12-28 PROCEDURE — 3331090001 HH PPS REVENUE CREDIT

## 2018-12-28 NOTE — TELEPHONE ENCOUNTER
Spoke to Lorenzo Melton regarding hospital bed, caterina lift, and patient agitation during PT. Lorenzo Melton spoke to a representative at [facility].  Patient cannot keep the fully automatic bed and pay the difference. [facility] recommended [facility] medical.    Genevieve Kane lift need will be addressed on next visit 1/8/19. DME will be ordered after visit is complete. Julian from PT notified [facility] that patient has had some agitation during therapy session. This nurse will follow up on progress of PT and offer assistance.     This nurse will also contact Lorenzo Melton on 1-4-19 for an update on DME

## 2018-12-29 PROCEDURE — 3331090001 HH PPS REVENUE CREDIT

## 2018-12-29 PROCEDURE — 3331090002 HH PPS REVENUE DEBIT

## 2018-12-30 PROCEDURE — 3331090002 HH PPS REVENUE DEBIT

## 2018-12-30 PROCEDURE — 3331090001 HH PPS REVENUE CREDIT

## 2018-12-31 ENCOUNTER — HOME VISIT (OUTPATIENT)
Dept: FAMILY MEDICINE CLINIC | Age: 83
End: 2018-12-31

## 2018-12-31 VITALS
DIASTOLIC BLOOD PRESSURE: 78 MMHG | RESPIRATION RATE: 16 BRPM | HEART RATE: 78 BPM | TEMPERATURE: 98.2 F | OXYGEN SATURATION: 97 % | SYSTOLIC BLOOD PRESSURE: 158 MMHG

## 2018-12-31 DIAGNOSIS — R30.0 DYSURIA: ICD-10-CM

## 2018-12-31 DIAGNOSIS — R53.83 OTHER FATIGUE: ICD-10-CM

## 2018-12-31 DIAGNOSIS — N39.0 URINARY TRACT INFECTION WITHOUT HEMATURIA, SITE UNSPECIFIED: Primary | ICD-10-CM

## 2018-12-31 PROCEDURE — 3331090002 HH PPS REVENUE DEBIT

## 2018-12-31 PROCEDURE — 3331090001 HH PPS REVENUE CREDIT

## 2018-12-31 RX ORDER — AMOXICILLIN 250 MG/5ML
250 POWDER, FOR SUSPENSION ORAL 3 TIMES DAILY
Qty: 105 ML | Refills: 0 | Status: SHIPPED | OUTPATIENT
Start: 2018-12-31 | End: 2018-12-31

## 2018-12-31 RX ORDER — AMOXICILLIN 250 MG/5ML
250 POWDER, FOR SUSPENSION ORAL 3 TIMES DAILY
Qty: 105 ML | Refills: 0 | Status: SHIPPED | OUTPATIENT
Start: 2018-12-31 | End: 2019-01-07

## 2018-12-31 NOTE — PATIENT INSTRUCTIONS
Dear Ronak Valera , It was a pleasure seeing you at home today with Home [facility] ([phone]) Your stated goal: To get out of bed again This is what we talked about: 1. Burning with urination/cloudy urine/fatigue/nausea 
-Your urine looks infected 
-I am sending it for culture to the lab but the results will not be back for at least 3 days. We will call with results 
-Because you are symptomatic, I am starting amoxicillin today, which is an antibiotic 
-I wrote for the liquid form as you requested 
-Call us for fever, chills, confusion, back or flank pain or worsening overall 
-Increase your fluid intake which you acknowledge is poor 2. Health Maintenance - Eli Begum will be contacted to provide your Pneumonia and Influenza vaccine There are no preventive care reminders to display for this patient. 3. Advance Care Planning This is what you have shared with us about Advance Care Planning Primary Decision Maker (Health Care Agent): Dora Spear Relationship to patient: [phone] Phone number: 
[x] Named in a scanned document  
[] Legal Next of Kin 
[] Guardian ACP documents you current have include: 
[x] Advance Directive or Living Will 
[x] Durable Do Not Resuscitate 
[] Physician Orders for Scope of Treatment (POST) [] Medical Power of  
[] Other Someone from the Home Based Primary Care Team will see you again in 4-1 to 2 weeks. If there are any concerns before that time, such as medication questions, worsening symptoms or a need to see a physician for an urgent or emergent situation; please call [phone]. A physician is also on call after our normal business hours of 8am to 5pm.  
 
In order to serve you better, please allow up to 48 hours for prescription refills to be processed. Certain medications may require more paperwork or a written prescription that you may need to  from the office.  We appreciate you letting us know of any refill requests as soon as possible. Sincerely, The Home Based Primary Care Team 
 
MD Moisés Nicole NP Maricela Ask, NP Evertt Channel, RN Raeann Bertin, RN Urinary Tract Infections in Men: Care Instructions Your Care Instructions A urinary tract infection, or UTI, is a general term for an infection anywhere between the kidneys and the tip of the penis. UTIs can also be a result of a prostate problem. Most cause pain or burning when you urinate. Most UTIs are caused by bacteria and can be cured with antibiotics. It is important to complete your treatment so that the infection does not get worse. Follow-up care is a key part of your treatment and safety. Be sure to make and go to all appointments, and call your doctor if you are having problems. It's also a good idea to know your test results and keep a list of the medicines you take. How can you care for yourself at home? · Take your antibiotics as prescribed. Do not stop taking them just because you feel better. You need to take the full course of antibiotics. · Take your medicines exactly as prescribed. Your doctor may have prescribed a medicine, such as phenazopyridine (Pyridium), to help relieve pain when you urinate. This turns your urine orange. You may stop taking it when your symptoms get better. But be sure to take all of your antibiotics, which treat the infection. · Drink extra water for the next day or two. This will help make the urine less concentrated and help wash out the bacteria causing the infection. (If you have kidney, heart, or liver disease and have to limit your fluids, talk with your doctor before you increase your fluid intake.) · Avoid drinks that are carbonated or have caffeine. They can irritate the bladder. · Urinate often. Try to empty your bladder each time.  
· To relieve pain, take a hot bath or lay a heating pad (set on low) over your lower belly or genital area. Never go to sleep with a heating pad in place. To help prevent UTIs · Drink plenty of fluids, enough so that your urine is light yellow or clear like water. If you have kidney, heart, or liver disease and have to limit fluids, talk with your doctor before you increase the amount of fluids you drink. · Urinate when you have the urge. Do not hold your urine for a long time. Urinate before you go to sleep. · Keep your penis clean. Catheter care If you have a drainage tube (catheter) in place, the following steps will help you care for it. · Always wash your hands before and after touching your catheter. · Check the area around the urethra for inflammation or signs of infection. Signs of infection include irritated, swollen, red, or tender skin, or pus around the catheter. · Clean the area around the catheter with soap and water two times a day. Dry with a clean towel afterward. · Do not apply powder or lotion to the skin around the catheter. To empty the urine collection bag · Wash your hands with soap and water. · Without touching the drain spout, remove the spout from its sleeve at the bottom of the collection bag. Open the valve on the spout. · Let the urine flow out of the bag and into the toilet or a container. Do not let the tubing or drain spout touch anything. · After you empty the bag, clean the end of the drain spout with tissue and water. Close the valve and put the drain spout back into its sleeve at the bottom of the collection bag. · Wash your hands with soap and water. When should you call for help? Call your doctor now or seek immediate medical care if: 
  · Symptoms such as a fever, chills, nausea, or vomiting get worse or happen for the first time.  
  · You have new pain in your back just below your rib cage. This is called flank pain.  
  · There is new blood or pus in your urine.  
  · You are not able to take or keep down your antibiotics.  Watch closely for changes in your health, and be sure to contact your doctor if: 
  · You are not getting better after taking an antibiotic for 2 days.  
  · Your symptoms go away but then come back. Where can you learn more? Go to http://félix-ho.info/. Enter A603 in the search box to learn more about \"Urinary Tract Infections in Men: Care Instructions. \" Current as of: March 21, 2018 Content Version: 11.8 © 9242-2470 [employer]. Care instructions adapted under license by [employer] (which disclaims liability or warranty for this information). If you have questions about a medical condition or this instruction, always ask your healthcare professional. [address] any warranty or liability for your use of this information.

## 2018-12-31 NOTE — PROGRESS NOTES
Home Based [address] [facility]  
[phone] Date of Current Visit: 12/31/18 SUMMARY:  
For full summary of patient's condition, please see Home Based Primary Care initial visit note on 12/3/18. This patient's chronic conditions including Juvenile rheumatoid arthritis with bed confinement have resulted in the inability to leave the home to receive primary medical care without a significant taxing effort. Today we are visiting the patient for the following reason burning with urination for 2 weeks with cloudy urine, fatigue, nausea GOALS OF CARE / TREATMENT PREFERENCES:  
GOALS OF CARE: 
Patient / health care proxy stated goals: To get out of bed again DIAGNOSES & PLAN:  
 
  ICD-10-CM ICD-9-CM 1. Urinary tract infection without hematuria, site unspecified N39.0 599.0 amoxicillin (AMOXIL) 250 mg/5 mL suspension 2. Dysuria R30.0 788. 1 URINALYSIS W/ RFLX MICROSCOPIC  
   CULTURE, URINE  
3. Other fatigue R53.83 780.79 Patient Instructions Dear Terri Young , It was a pleasure seeing you at home today with Home Based [facility] ([phone]) Your stated goal: To get out of bed again This is what we talked about: 1. Burning with urination/cloudy urine/fatigue/nausea 
-Your urine looks infected 
-I am sending it for culture to the lab but the results will not be back for at least 3 days. We will call with results 
-Because you are symptomatic, I am starting amoxicillin today, which is an antibiotic 
-I wrote for the liquid form as you requested 
-Call us for fever, chills, confusion, back or flank pain or worsening overall 
-Increase your fluid intake which you acknowledge is poor 2. Health Maintenance - [facility] will be contacted to provide your Pneumonia and Influenza vaccine There are no preventive care reminders to display for this patient. 3. Advance Care Planning This is what you have shared with us about Advance Care Planning Primary Decision Maker (Health Care Agent): Adrian Vicente Relationship to patient: [phone] Phone number: 
[x] Named in a scanned document  
[] Legal Next of Kin 
[] Guardian ACP documents you current have include: 
[x] Advance Directive or Living Will 
[x] Durable Do Not Resuscitate 
[] Physician Orders for Scope of Treatment (POST) [] Medical Power of  
[] Other Someone from the Home Based Primary Care Team will see you again in 4-1 to 2 weeks. If there are any concerns before that time, such as medication questions, worsening symptoms or a need to see a physician for an urgent or emergent situation; please call [phone]. A physician is also on call after our normal business hours of 8am to 5pm.  
 
In order to serve you better, please allow up to 48 hours for prescription refills to be processed. Certain medications may require more paperwork or a written prescription that you may need to  from the office. We appreciate you letting us know of any refill requests as soon as possible. Sincerely, The Home Based Primary Care Team 
 
MD Moris Dhillon, ELENITA Call, RN Benjamin Musa RN 
  
 
 
 HISTORY:  
HISTORY OBTAINED FROM:  
 
CHIEF COMPLAINT:  
Chief Complaint Patient presents with  Dysuria  Fatigue HPI/SUBJECTIVE:  
 
Pain controlled. Patient c/o dysuria X 2 weeks and care provider notes his urine is very cloudy and has some odor which is a change. He is also sleeping a lot more. He has had some nausea and vomited yesterday. No fever or chills. No known h/o UTI. He denies back or flank pain and endorses SP discomfort. Recent Fall: [x] No / [] Yes (when):   
Last bowel movement:  yesterday REVIEW OF SYSTEMS:  
 
The following systems were [x] reviewed / [] unable to be reviewed Systems: constitutional, ears/nose/mouth/throat, respiratory, gastrointestinal, genitourinary, musculoskeletal, integumentary, neurologic, psychiatric, endocrine. Positive findings noted below: constipation, chronic pain, dysuria, fatigue, nausea, SP fullness VITAL SIGNS, PHYSICAL EXAM & FUNCTIONAL ASSESSMENT Vital Signs: Wt Readings from Last 3 Encounters:  
No data found for [employer] Temp Readings from Last 3 Encounters:  
12/31/18 98.2 °F (36.8 °C) (Oral) 12/27/18 97.8 °F (36.6 °C) (Temporal) 12/18/18 97 °F (36.1 °C) (Temporal) BP Readings from Last 3 Encounters:  
12/31/18 158/78  
12/27/18 150/80  
12/26/18 140/68 Pulse Readings from Last 3 Encounters:  
12/31/18 78  
12/27/18 72  
12/26/18 83 Physical Exam: 
 
Constitutional: cacuasian male, sitting up in hospital bed, NAD Eyes: pupils equal, anicteric ENMT: EAC's cleal bilat, no nasal discharge, moist mucous membranes Cardiovascular: regular rhythm, distal pulses intact Respiratory: breathing not labored, symmetric, CTA Gastrointestinal: soft, mild SP TTP, +bowel sounds, -CVAT Musculoskeletal:  no tenderness to palpation Skin: warm, dry, no open wounds, rashes or ulcerations Neurologic: following commands, moving all extremities Psychiatric: normal affect, no hallucinations Other:  Urine very, very cloudy with odor and sediment Functional Assessment (description): 
 
Palliative Performance Scale:  50 Timed Up and Go (TUG): N/A Fall Risk Assessment, last 12 mths 12/13/2018 Able to walk? No  
 
 
 LAB DATA REVIEWED:  
 
No results found for: HBA1C, HGBE8, JHV1KOAY, WOH0JYAD, EZU0AINZ No results found for: Noelle Parada, MCA2, [address], MCAU, [address], MCALPOCT No results found for: TSH, TSH2, TSH3, TSHP, TSHEXT, TSHEXT No results found for: Chrissy Gonzalez, Garrett Alvarado, Yessi Alicea, VD3RIA Lab Results Component Value Date/Time WBC 8.4 12/13/2018 12:14 PM  
 HGB 13.0 12/13/2018 12:14 PM  
 PLATELET 002 77/64/0687 12:14 PM  
 
Lab Results Component Value Date/Time Sodium 136 12/13/2018 12:14 PM  
 Potassium 4.5 12/13/2018 12:14 PM  
 Chloride 100 12/13/2018 12:14 PM  
 CO2 23 12/13/2018 12:14 PM  
 BUN 4 (L) 12/13/2018 12:14 PM  
 Creatinine 0.43 (L) 12/13/2018 12:14 PM  
 Calcium 8.7 12/13/2018 12:14 PM  
  
Lab Results Component Value Date/Time AST (SGOT) 13 12/13/2018 12:14 PM  
 Alk. phosphatase 70 12/13/2018 12:14 PM  
 Protein, total 6.1 12/13/2018 12:14 PM  
 Albumin 3.2 (L) 12/13/2018 12:14 PM  
 
No results found for: IRON, FE, TIBC, IBCT, PSAT, FERR  
 
 MEDICATIONS & PRESCRIPTIONS Not on File Current Outpatient Medications Medication Sig  
 amoxicillin (AMOXIL) 250 mg/5 mL suspension Take 5 mL by mouth three (3) times daily for 7 days.  morphine CR (MS CONTIN) 15 mg CR tablet Take 1 Tab by mouth nightly. Max Daily Amount: 15 mg.  
 DULoxetine (CYMBALTA) 60 mg capsule Take 1 Cap by mouth daily.  finasteride (PROSCAR) 5 mg tablet Take 1 Tab by mouth daily.  senna-docusate (PERICOLACE) 8.6-50 mg per tablet Take 2 Tabs by mouth two (2) times a day.  polyethylene glycol (MIRALAX) 17 gram/dose powder Take 17 g by mouth daily.  oxyCODONE IR (ROXICODONE) 10 mg tab immediate release tablet 10MG PO QAM THEN Q4H PRN PAIN  
 cyclobenzaprine (FLEXERIL) 5 mg tablet Take 1 Tab by mouth three (3) times daily as needed for Muscle Spasm(s).  bisacodyl (DULCOLAX, BISACODYL,) 10 mg suppository Insert 10 mg into rectum daily as needed (for constipation). No current facility-administered medications for this visit. Medications Ordered Today Medications  DISCONTD: amoxicillin (AMOXIL) 250 mg/5 mL suspension Sig: Take 5 mL by mouth three (3) times daily for 7 days. Dispense:  105 mL Refill:  0  
 amoxicillin (AMOXIL) 250 mg/5 mL suspension Sig: Take 5 mL by mouth three (3) times daily for 7 days. Dispense:  105 mL   Refill:  0  
  
 
DME Recommendations: today we will place an order for fully Electric Hospital bed, fully electric hospital bed should be elevated at all times at 30 degrees to prevent aspiration and you also require the frequent changes in position that are not achieved by a standard bed, props or pillows. Total time: 40min Counseling / coordination time: UTI, how to increase fluids, Amoxicillin 
> 50% counseling / coordination?: No

## 2019-01-01 PROCEDURE — 3331090001 HH PPS REVENUE CREDIT

## 2019-01-01 PROCEDURE — 3331090002 HH PPS REVENUE DEBIT

## 2019-01-02 ENCOUNTER — TELEPHONE (OUTPATIENT)
Dept: FAMILY MEDICINE CLINIC | Age: 84
End: 2019-01-02

## 2019-01-02 ENCOUNTER — HOME CARE VISIT (OUTPATIENT)
Dept: HOME HEALTH SERVICES | Facility: HOME HEALTH | Age: 84
End: 2019-01-02
Payer: MEDICARE

## 2019-01-02 PROCEDURE — 3331090001 HH PPS REVENUE CREDIT

## 2019-01-02 PROCEDURE — 3331090002 HH PPS REVENUE DEBIT

## 2019-01-02 NOTE — TELEPHONE ENCOUNTER
Patient called on call provider for severe pain with constipationat 7am.    Patient took a bisacodyl suppository and miralax, as recommended by provider. Called patient back, stated pain was relieved and has a small bowel movement. Encouraged patient to follow bowel regimen recommended and reviewed with patient by ELENITA Luo. Advised patient to call if there are anymore concerns. Patient verbalized understanding and agreed to plan of care.

## 2019-01-03 ENCOUNTER — HOME CARE VISIT (OUTPATIENT)
Dept: SCHEDULING | Facility: HOME HEALTH | Age: 84
End: 2019-01-03
Payer: MEDICARE

## 2019-01-03 DIAGNOSIS — M05.79 RHEUMATOID ARTHRITIS INVOLVING MULTIPLE SITES WITH POSITIVE RHEUMATOID FACTOR (HCC): Primary | ICD-10-CM

## 2019-01-03 PROCEDURE — 3331090001 HH PPS REVENUE CREDIT

## 2019-01-03 PROCEDURE — G0157 HHC PT ASSISTANT EA 15: HCPCS

## 2019-01-03 PROCEDURE — 3331090002 HH PPS REVENUE DEBIT

## 2019-01-04 ENCOUNTER — TELEPHONE (OUTPATIENT)
Dept: FAMILY MEDICINE CLINIC | Age: 84
End: 2019-01-04

## 2019-01-04 ENCOUNTER — HOME CARE VISIT (OUTPATIENT)
Dept: HOME HEALTH SERVICES | Facility: HOME HEALTH | Age: 84
End: 2019-01-04
Payer: MEDICARE

## 2019-01-04 LAB
APPEARANCE UR: ABNORMAL
BACTERIA #/AREA URNS HPF: ABNORMAL /[HPF]
BACTERIA UR CULT: ABNORMAL
BILIRUB UR QL STRIP: NEGATIVE
CASTS URNS MICRO: ABNORMAL
CASTS URNS QL MICRO: PRESENT /LPF
COLOR UR: YELLOW
EPI CELLS #/AREA URNS HPF: ABNORMAL /HPF
GLUCOSE UR QL: NEGATIVE
HGB UR QL STRIP: ABNORMAL
KETONES UR QL STRIP: NEGATIVE
LEUKOCYTE ESTERASE UR QL STRIP: ABNORMAL
MICRO URNS: ABNORMAL
MUCOUS THREADS URNS QL MICRO: PRESENT
NITRITE UR QL STRIP: NEGATIVE
PH UR STRIP: 6.5 [PH] (ref 5–7.5)
PROT UR QL STRIP: ABNORMAL
RBC #/AREA URNS HPF: ABNORMAL /HPF
SP GR UR: 1.02 (ref 1–1.03)
UROBILINOGEN UR STRIP-MCNC: 0.2 MG/DL (ref 0.2–1)
WBC #/AREA URNS HPF: >30 /HPF

## 2019-01-04 PROCEDURE — 3331090002 HH PPS REVENUE DEBIT

## 2019-01-04 PROCEDURE — 3331090001 HH PPS REVENUE CREDIT

## 2019-01-04 RX ORDER — CIPROFLOXACIN 500 MG/1
500 TABLET ORAL 2 TIMES DAILY
Qty: 10 TAB | Refills: 0 | Status: SHIPPED | OUTPATIENT
Start: 2019-01-04 | End: 2019-01-29 | Stop reason: ALTCHOICE

## 2019-01-04 NOTE — TELEPHONE ENCOUNTER
Culture and Sensitivity reviewed. Discussed with Jorge Vogt who will be picking up new abx. Will start Cipro 500mg BID x 5 days. Has scheduled provider follow up on 1/8/19.

## 2019-01-04 NOTE — PROGRESS NOTES
Culture and Sensitivity reviewed. Discussed with Alan Arteaga who will be picking up new abx. Will start Cipro 500mg BID x 5 days. Has scheduled provider follow up on 1/8/19.

## 2019-01-05 VITALS
OXYGEN SATURATION: 96 % | SYSTOLIC BLOOD PRESSURE: 138 MMHG | HEART RATE: 79 BPM | DIASTOLIC BLOOD PRESSURE: 76 MMHG | RESPIRATION RATE: 16 BRPM | TEMPERATURE: 96.9 F

## 2019-01-05 PROCEDURE — 3331090001 HH PPS REVENUE CREDIT

## 2019-01-05 PROCEDURE — 3331090002 HH PPS REVENUE DEBIT

## 2019-01-06 PROCEDURE — 3331090001 HH PPS REVENUE CREDIT

## 2019-01-06 PROCEDURE — 3331090002 HH PPS REVENUE DEBIT

## 2019-01-07 ENCOUNTER — TELEPHONE (OUTPATIENT)
Dept: FAMILY MEDICINE CLINIC | Age: 84
End: 2019-01-07

## 2019-01-07 PROCEDURE — 3331090001 HH PPS REVENUE CREDIT

## 2019-01-07 PROCEDURE — 3331090002 HH PPS REVENUE DEBIT

## 2019-01-07 NOTE — TELEPHONE ENCOUNTER
[facility] was given updated address and phone number to contact [facility] for bed payment and delivery. Called Jodie Marquez to inform her that she will be hearing from them today.

## 2019-01-08 ENCOUNTER — TELEPHONE (OUTPATIENT)
Dept: FAMILY MEDICINE CLINIC | Age: 84
End: 2019-01-08

## 2019-01-08 ENCOUNTER — HOME VISIT (OUTPATIENT)
Dept: FAMILY MEDICINE CLINIC | Age: 84
End: 2019-01-08

## 2019-01-08 ENCOUNTER — HOME CARE VISIT (OUTPATIENT)
Dept: HOME HEALTH SERVICES | Facility: HOME HEALTH | Age: 84
End: 2019-01-08
Payer: MEDICARE

## 2019-01-08 VITALS
HEART RATE: 79 BPM | RESPIRATION RATE: 16 BRPM | SYSTOLIC BLOOD PRESSURE: 148 MMHG | DIASTOLIC BLOOD PRESSURE: 76 MMHG | OXYGEN SATURATION: 92 % | TEMPERATURE: 97.7 F

## 2019-01-08 DIAGNOSIS — N30.00 ACUTE CYSTITIS WITHOUT HEMATURIA: Primary | ICD-10-CM

## 2019-01-08 DIAGNOSIS — Z74.01 BED CONFINEMENT STATUS: ICD-10-CM

## 2019-01-08 DIAGNOSIS — M06.09 RHEUMATOID ARTHRITIS OF MULTIPLE SITES WITH NEGATIVE RHEUMATOID FACTOR (HCC): ICD-10-CM

## 2019-01-08 DIAGNOSIS — R41.3 MEMORY IMPAIRMENT: ICD-10-CM

## 2019-01-08 DIAGNOSIS — K59.03 DRUG-INDUCED CONSTIPATION: ICD-10-CM

## 2019-01-08 PROCEDURE — 3331090001 HH PPS REVENUE CREDIT

## 2019-01-08 PROCEDURE — 3331090002 HH PPS REVENUE DEBIT

## 2019-01-08 RX ORDER — MORPHINE SULFATE 15 MG/1
15 TABLET, FILM COATED, EXTENDED RELEASE ORAL
Qty: 30 TAB | Refills: 0
Start: 2019-01-30 | End: 2019-02-18 | Stop reason: SDUPTHER

## 2019-01-08 NOTE — PROGRESS NOTES
Home Based [address] [facility]  
[phone] Date of Current Visit: 01/08/19 SUMMARY:  
For full summary of patient's condition, please see Home Based Primary Care initial visit note on 12/3/18. This patient's chronic conditions including Juvenile rheumatoid arthritis with bed confinement have resulted in the inability to leave the home to receive primary medical care without a significant taxing effort. Today we are visiting the patient for the following reason: Follow up UTI, chronic pain, constipation GOALS OF CARE / TREATMENT PREFERENCES:  
GOALS OF CARE: 
Patient / health care proxy stated goals: To get out of bed again CODE STATUS: DDNR (on file) Advance Care Planning : This is what you have shared with us about Advance Care Planning Primary Decision Maker (Health Care Agent): Kamran Fernandes Relationship to patient: Cousin Phone number:  [phone] [x] Named in a scanned document  
[] Legal Next of Kin 
[] Guardian ACP documents you current have include: 
[x] Advance Directive or Living Will 
[x] Durable Do Not Resuscitate 
[] Physician Orders for Scope of Treatment (POST) [] Medical Power of  
[] Other DIAGNOSES & PLAN:  
 
  ICD-10-CM ICD-9-CM 1. Acute cystitis without hematuria N30.00 595.0 2. Rheumatoid arthritis of multiple sites with negative rheumatoid factor (HCC) M06.09 714.0 morphine CR (MS CONTIN) 15 mg CR tablet 3. Bed confinement status Z74.01 V49.84   
4. Memory impairment R41.3 780.93   
5. Drug-induced constipation K59.03 564.09   
  E980.5 Patient Instructions Dear Naseem Bryant , It was a pleasure seeing you at home today with Home Based [address] [place] [address] ([phone]) Your stated goal: To get out of bed again This is what we talked about: 1. Burning with urination/cloudy urine/fatigue/nausea - You are being treated for a UTI - The medication we first started you on, the bacteria was resistant to it - You have been prescribed Cipro for 5 days - Your urinary symptoms you were experiencing have improved - Continue to increase your fluids and maintain hydration 2. Chronic pain - The long acting morphine you take at night is working well for you - A refill was left in the home today that can be filled toward the end of the month - You are using scheduled Oxycodone in the morning which is also is working well - You are using very little \"as needed\" pain medication during the day - We discussed that when you are due for a refill, you will not need as many tablets 3. Constipation - You had 4-5 days without a bowel movement - Pericolace should be taken 2 tabs twice a day - If no bowel movement in 3 days, please use Miralax - If you continue to have no improvement in bowel movement frequency, additional adjustments may be made 4. Equipment needs - We have requested a fully automatic hospital bed with a gel overlay - The current bed you have will be returned to the hospice company - A caterina lift has also been requested 5. Health Maintenance - The flu and pneumonia vaccines were completed on 12/20/18 
- You are now up to date There are no preventive care reminders to display for this patient. 6. Advance Care Planning This is what you have shared with us about Advance Care Planning Primary Decision Maker (Health Care Agent): Liborio Ball Relationship to patient: Cousin Phone number:  [phone] [x] Named in a scanned document  
[] Legal Next of Kin 
[] Guardian ACP documents you current have include: 
[x] Advance Directive or Living Will 
[x] Durable Do Not Resuscitate 
[] Physician Orders for Scope of Treatment (POST) [] Medical Power of  
[] Other Someone from the Home Based Primary Care Team will see you again in 6 weeks. If there are any concerns before that time, such as medication questions, worsening symptoms or a need to see a physician for an urgent or emergent situation; please call [phone]. A physician is also on call after our normal business hours of 8am to 5pm.  
 
In order to serve you better, please allow up to 48 hours for prescription refills to be processed. Certain medications may require more paperwork or a written prescription that you may need to  from the office. We appreciate you letting us know of any refill requests as soon as possible. Sincerely, The Home Based Primary Care Team 
 
MD Raffy Pathak NP Elihue Curly, NP Jerry Blamer, RN Pecola Alpers, RN Constipation: Care Instructions Your Care Instructions Constipation means that you have a hard time passing stools (bowel movements). People pass stools from 3 times a day to once every 3 days. What is normal for you may be different. Constipation may occur with pain in the rectum and cramping. The pain may get worse when you try to pass stools. Sometimes there are small amounts of bright red blood on toilet paper or the surface of stools. This is because of enlarged veins near the rectum (hemorrhoids). A few changes in your diet and lifestyle may help you avoid ongoing constipation. Your doctor may also prescribe medicine to help loosen your stool. Some medicines can cause constipation. These include pain medicines and antidepressants. Tell your doctor about all the medicines you take. Your doctor may want to make a medicine change to ease your symptoms. Follow-up care is a key part of your treatment and safety. Be sure to make and go to all appointments, and call your doctor if you are having problems. It's also a good idea to know your test results and keep a list of the medicines you take. How can you care for yourself at home?  
· Drink plenty of fluids, enough so that your urine is light yellow or clear like water. If you have kidney, heart, or liver disease and have to limit fluids, talk with your doctor before you increase the amount of fluids you drink. · Include high-fiber foods in your diet each day. These include fruits, vegetables, beans, and whole grains. · Get at least 30 minutes of exercise on most days of the week. Walking is a good choice. You also may want to do other activities, such as running, swimming, cycling, or playing tennis or team sports. · Take a fiber supplement, such as Citrucel or Metamucil, every day. Read and follow all instructions on the label. · Schedule time each day for a bowel movement. A daily routine may help. Take your time having your bowel movement. · Support your feet with a small step stool when you sit on the toilet. This helps flex your hips and places your pelvis in a squatting position. · Your doctor may recommend an over-the-counter laxative to relieve your constipation. Examples are Milk of Magnesia and MiraLax. Read and follow all instructions on the label. Do not use laxatives on a long-term basis. When should you call for help? Call your doctor now or seek immediate medical care if: 
  · You have new or worse belly pain.  
  · You have new or worse nausea or vomiting.  
  · You have blood in your stools.  
 Watch closely for changes in your health, and be sure to contact your doctor if: 
  · Your constipation is getting worse.  
  · You do not get better as expected. Where can you learn more? Go to http://félix-ho.info/. Enter 21 [phone] in the search box to learn more about \"Constipation: Care Instructions. \" Current as of: November 20, 2017 Content Version: 11.8 © 1330-6294 [employer]. Care instructions adapted under license by [employer] (which disclaims liability or warranty for this information).  If you have questions about a medical condition or this instruction, always ask your healthcare professional. [place] [address] any warranty or liability for your use of this information. HISTORY:  
HISTORY OBTAINED FROM:  
 
CHIEF COMPLAINT:  
Chief Complaint Patient presents with  Bladder Infection  Pain (Chronic)  Constipation HPI/SUBJECTIVE:  
 
Pain controlled. Had 4-5 days of no BM until yesterday. UTI resistant to PCN. Switched to Cipro 500 BID x 5 days. Urinary symptoms resolving. Recent Fall: [x] No / [] Yes (when):   
Last bowel movement: yesterday REVIEW OF SYSTEMS:  
 
The following systems were [x] reviewed / [] unable to be reviewed Systems: constitutional, ears/nose/mouth/throat, respiratory, gastrointestinal, genitourinary, musculoskeletal, integumentary, neurologic, psychiatric, endocrine. Positive findings noted below: constipation, chronic pain, VITAL SIGNS, PHYSICAL EXAM & FUNCTIONAL ASSESSMENT Vital Signs: Wt Readings from Last 3 Encounters:  
No data found for [employer] Temp Readings from Last 3 Encounters:  
01/08/19 97.7 °F (36.5 °C) (Temporal) 01/03/19 96.9 °F (36.1 °C) (Temporal) 12/31/18 98.2 °F (36.8 °C) (Oral) BP Readings from Last 3 Encounters:  
01/08/19 148/76  
01/03/19 138/76  
12/31/18 158/78 Pulse Readings from Last 3 Encounters:  
01/08/19 79  
01/03/19 79  
12/31/18 78 Physical Exam: 
 
Constitutional: cacuasian male, sitting up in hospital bed, NAD Eyes: pupils equal, anicteric ENMT: EAC's cleal bilat, no nasal discharge, moist mucous membranes Cardiovascular: regular rhythm, distal pulses intact Respiratory: breathing not labored, symmetric, CTA bilat Gastrointestinal: soft, mild SP TTP, +bowel sounds Musculoskeletal:  no tenderness to palpation Skin: warm, dry, no open wounds, rashes or ulcerations Neurologic: a/ox3 but forgetful and often repetitive, poor recall at times, following commands, moving all extremities Psychiatric: normal affect, no hallucinations Other: MS contin filled #30 on 1/2/19 = 30 remaining Oxycodone #120 on 12/18/18 = 107 remaining Functional Assessment (description): 
 
Palliative Performance Scale:  50 Timed Up and Go (TUG): N/A Fall Risk Assessment, last 12 mths 12/13/2018 Able to walk? No  
 
 
 LAB DATA REVIEWED:  
 
No results found for: HBA1C, HGBE8, LYL2KFXY, [id number], [id number] No results found for: [place] [employer], MCA2, [address], MCAU, [address], MCALPOCT No results found for: TSH, TSH2, TSH3, TSHP, TSHEXT, TSHEXT No results found for: Marcel Brunner, Leonidas Joel, Leo Douglas, ECTOR3OLIVER Lab Results Component Value Date/Time WBC 8.4 12/13/2018 12:14 PM  
 HGB 13.0 12/13/2018 12:14 PM  
 PLATELET 280 69/84/9667 12:14 PM  
 
Lab Results Component Value Date/Time Sodium 136 12/13/2018 12:14 PM  
 Potassium 4.5 12/13/2018 12:14 PM  
 Chloride 100 12/13/2018 12:14 PM  
 CO2 23 12/13/2018 12:14 PM  
 BUN 4 (L) 12/13/2018 12:14 PM  
 Creatinine 0.43 (L) 12/13/2018 12:14 PM  
 Calcium 8.7 12/13/2018 12:14 PM  
  
Lab Results Component Value Date/Time AST (SGOT) 13 12/13/2018 12:14 PM  
 Alk. phosphatase 70 12/13/2018 12:14 PM  
 Protein, total 6.1 12/13/2018 12:14 PM  
 Albumin 3.2 (L) 12/13/2018 12:14 PM  
 
No results found for: IRON, FE, TIBC, IBCT, PSAT, FERR  
 
 MEDICATIONS & PRESCRIPTIONS Not on File Current Outpatient Medications Medication Sig  [START ON 1/30/2019] morphine CR (MS CONTIN) 15 mg CR tablet Take 1 Tab by mouth nightly. Max Daily Amount: 15 mg.  
 ciprofloxacin HCl (CIPRO) 500 mg tablet Take 1 Tab by mouth two (2) times a day.  DULoxetine (CYMBALTA) 60 mg capsule Take 1 Cap by mouth daily.  oxyCODONE IR (ROXICODONE) 10 mg tab immediate release tablet 10MG PO QAM THEN Q4H PRN PAIN  
 finasteride (PROSCAR) 5 mg tablet Take 1 Tab by mouth daily.  senna-docusate (PERICOLACE) 8.6-50 mg per tablet Take 2 Tabs by mouth two (2) times a day.  cyclobenzaprine (FLEXERIL) 5 mg tablet Take 1 Tab by mouth three (3) times daily as needed for Muscle Spasm(s).  polyethylene glycol (MIRALAX) 17 gram/dose powder Take 17 g by mouth daily.  bisacodyl (DULCOLAX, BISACODYL,) 10 mg suppository Insert 10 mg into rectum daily as needed (for constipation). No current facility-administered medications for this visit. Medications Ordered Today Medications  morphine CR (MS CONTIN) 15 mg CR tablet Sig: Take 1 Tab by mouth nightly. Max Daily Amount: 15 mg. Dispense:  30 Tab Refill:  0  
  
 
DME Recommendations: Today we will place an order for fully [address] [facility]. bed, with gel overlay due to altered sensory perception, impaired nutrition, compromised circulatory issues and urinary incontinence. The fully electric hospital bed should be elevated at all times at 30 degrees to prevent aspiration and you also require the frequent changes in position that are not achieved by a standard bed, props or pillows. Total time: 40min Counseling / coordination time: 25min review of urinary culture, education/counseling provided on abx change, counseling on constipation provided with plan implementation, how to increase fluids, pain medication reduction plan when indicated. Coordination with DME company for hospital bed and caterina lift 
> 50% counseling / coordination?: yes

## 2019-01-08 NOTE — PATIENT INSTRUCTIONS
Dear Clayton Davis , It was a pleasure seeing you at home today with [facility] ([phone]) Your stated goal: To get out of bed again This is what we talked about: 1. Burning with urination/cloudy urine/fatigue/nausea - You are being treated for a UTI 
- The medication we first started you on, the bacteria was resistant to it - You have been prescribed Cipro for 5 days - Your urinary symptoms you were experiencing have improved - Continue to increase your fluids and maintain hydration 2. Chronic pain - The long acting morphine you take at night is working well for you - A refill was left in the home today that can be filled toward the end of the month - You are using scheduled Oxycodone in the morning which is also is working well - You are using very little \"as needed\" pain medication during the day - We discussed that when you are due for a refill, you will not need as many tablets 3. Constipation - You had 4-5 days without a bowel movement - Pericolace should be taken 2 tabs twice a day - If no bowel movement in 3 days, please use Miralax - If you continue to have no improvement in bowel movement frequency, additional adjustments may be made 4. Equipment needs - We have requested a fully automatic hospital bed with a gel overlay - The current bed you have will be returned to the [facility] - A caterina lift has also been requested 5. Health Maintenance - The flu and pneumonia vaccines were completed on 18 
- You are now up to date There are no preventive care reminders to display for this patient. 6. Advance Care Planning This is what you have shared with us about Advance Care Planning Primary Decision Maker (Health Care Agent): Krystina Wan Relationship to patient: Cousin Phone number:  [phone] [x] Named in a scanned document  
[] Legal Next of Kin 
[] Guardian ACP documents you current have include: [x] Advance Directive or Living Will 
[x] Durable Do Not Resuscitate 
[] Physician Orders for Scope of Treatment (POST) [] Medical Power of  
[] Other Someone from the Home Based Primary Care Team will see you again in 6 weeks. If there are any concerns before that time, such as medication questions, worsening symptoms or a need to see a physician for an urgent or emergent situation; please call [phone]. A physician is also on call after our normal business hours of 8am to 5pm.  
 
In order to serve you better, please allow up to 48 hours for prescription refills to be processed. Certain medications may require more paperwork or a written prescription that you may need to  from the office. We appreciate you letting us know of any refill requests as soon as possible. Sincerely, The [facility] Primary Care Team 
 
MD Mike Rondon, ELENITA Murphy, ELENITA House, GERARD Montoya, GERARD Constipation: Care Instructions Your Care Instructions Constipation means that you have a hard time passing stools (bowel movements). People pass stools from 3 times a day to once every 3 days. What is normal for you may be different. Constipation may occur with pain in the rectum and cramping. The pain may get worse when you try to pass stools. Sometimes there are small amounts of bright red blood on toilet paper or the surface of stools. This is because of enlarged veins near the rectum (hemorrhoids). A few changes in your diet and lifestyle may help you avoid ongoing constipation. Your doctor may also prescribe medicine to help loosen your stool. Some medicines can cause constipation. These include pain medicines and antidepressants. Tell your doctor about all the medicines you take. Your doctor may want to make a medicine change to ease your symptoms. Follow-up care is a key part of your treatment and safety.  Be sure to make and go to all appointments, and call your doctor if you are having problems. It's also a good idea to know your test results and keep a list of the medicines you take. How can you care for yourself at home? · Drink plenty of fluids, enough so that your urine is light yellow or clear like water. If you have kidney, heart, or liver disease and have to limit fluids, talk with your doctor before you increase the amount of fluids you drink. · Include high-fiber foods in your diet each day. These include fruits, vegetables, beans, and whole grains. · Get at least 30 minutes of exercise on most days of the week. Walking is a good choice. You also may want to do other activities, such as running, swimming, cycling, or playing tennis or team sports. · Take a fiber supplement, such as Citrucel or Metamucil, every day. Read and follow all instructions on the label. · Schedule time each day for a bowel movement. A daily routine may help. Take your time having your bowel movement. · Support your feet with a small step stool when you sit on the toilet. This helps flex your hips and places your pelvis in a squatting position. · Your doctor may recommend an over-the-counter laxative to relieve your constipation. Examples are Milk of Magnesia and MiraLax. Read and follow all instructions on the label. Do not use laxatives on a long-term basis. When should you call for help? Call your doctor now or seek immediate medical care if: 
  · You have new or worse belly pain.  
  · You have new or worse nausea or vomiting.  
  · You have blood in your stools.  
 Watch closely for changes in your health, and be sure to contact your doctor if: 
  · Your constipation is getting worse.  
  · You do not get better as expected. Where can you learn more? Go to http://félix-ho.info/. Enter 21 [phone] in the search box to learn more about \"Constipation: Care Instructions. \" Current as of: November 20, 2017 Content Version: 11.8 © 6070-5158 Healthwise, Incorporated. Care instructions adapted under license by [employer] (which disclaims liability or warranty for this information). If you have questions about a medical condition or this instruction, always ask your healthcare professional. [address] any warranty or liability for your use of this information.

## 2019-01-08 NOTE — TELEPHONE ENCOUNTER
Darin stated the pt is having a hospital bed delivered tomorrow but she doesn't think we ordered a bed overlay and the pt will need this because he is in bed 24 hours a day.

## 2019-01-09 ENCOUNTER — HOME CARE VISIT (OUTPATIENT)
Dept: SCHEDULING | Facility: HOME HEALTH | Age: 84
End: 2019-01-09
Payer: MEDICARE

## 2019-01-09 VITALS
RESPIRATION RATE: 16 BRPM | TEMPERATURE: 96.8 F | SYSTOLIC BLOOD PRESSURE: 158 MMHG | OXYGEN SATURATION: 97 % | DIASTOLIC BLOOD PRESSURE: 80 MMHG | HEART RATE: 78 BPM

## 2019-01-09 DIAGNOSIS — M05.79 RHEUMATOID ARTHRITIS INVOLVING MULTIPLE SITES WITH POSITIVE RHEUMATOID FACTOR (HCC): Primary | ICD-10-CM

## 2019-01-09 PROBLEM — K59.03 DRUG-INDUCED CONSTIPATION: Status: ACTIVE | Noted: 2019-01-09

## 2019-01-09 PROBLEM — S22.069A CLOSED T7 FRACTURE (HCC): Status: ACTIVE | Noted: 2019-01-09

## 2019-01-09 PROBLEM — S12.600A C7 CERVICAL FRACTURE (HCC): Status: ACTIVE | Noted: 2019-01-09

## 2019-01-09 PROBLEM — Z74.01 BED CONFINEMENT STATUS: Status: ACTIVE | Noted: 2019-01-09

## 2019-01-09 PROBLEM — R41.3 MEMORY IMPAIRMENT: Status: ACTIVE | Noted: 2019-01-09

## 2019-01-09 PROBLEM — M45.0 ANKYLOSING SPONDYLITIS OF MULTIPLE SITES IN SPINE (HCC): Status: ACTIVE | Noted: 2019-01-09

## 2019-01-09 PROBLEM — J69.0 ASPIRATION PNEUMONIA (HCC): Status: ACTIVE | Noted: 2019-01-09

## 2019-01-09 PROCEDURE — 3331090002 HH PPS REVENUE DEBIT

## 2019-01-09 PROCEDURE — 3331090001 HH PPS REVENUE CREDIT

## 2019-01-09 PROCEDURE — G0157 HHC PT ASSISTANT EA 15: HCPCS

## 2019-01-10 PROCEDURE — 3331090001 HH PPS REVENUE CREDIT

## 2019-01-10 PROCEDURE — 3331090002 HH PPS REVENUE DEBIT

## 2019-01-11 PROCEDURE — 3331090001 HH PPS REVENUE CREDIT

## 2019-01-11 PROCEDURE — 3331090002 HH PPS REVENUE DEBIT

## 2019-01-12 PROCEDURE — 3331090002 HH PPS REVENUE DEBIT

## 2019-01-12 PROCEDURE — 3331090001 HH PPS REVENUE CREDIT

## 2019-01-13 PROCEDURE — 3331090001 HH PPS REVENUE CREDIT

## 2019-01-13 PROCEDURE — 3331090002 HH PPS REVENUE DEBIT

## 2019-01-14 ENCOUNTER — HOME CARE VISIT (OUTPATIENT)
Dept: SCHEDULING | Facility: HOME HEALTH | Age: 84
End: 2019-01-14
Payer: MEDICARE

## 2019-01-14 VITALS
HEART RATE: 95 BPM | SYSTOLIC BLOOD PRESSURE: 130 MMHG | TEMPERATURE: 97.9 F | RESPIRATION RATE: 16 BRPM | OXYGEN SATURATION: 96 % | DIASTOLIC BLOOD PRESSURE: 86 MMHG

## 2019-01-14 PROCEDURE — 3331090002 HH PPS REVENUE DEBIT

## 2019-01-14 PROCEDURE — 3331090001 HH PPS REVENUE CREDIT

## 2019-01-14 PROCEDURE — G0157 HHC PT ASSISTANT EA 15: HCPCS

## 2019-01-15 PROCEDURE — 3331090002 HH PPS REVENUE DEBIT

## 2019-01-15 PROCEDURE — 3331090001 HH PPS REVENUE CREDIT

## 2019-01-16 ENCOUNTER — HOME CARE VISIT (OUTPATIENT)
Dept: SCHEDULING | Facility: HOME HEALTH | Age: 84
End: 2019-01-16
Payer: MEDICARE

## 2019-01-16 VITALS
TEMPERATURE: 98.6 F | DIASTOLIC BLOOD PRESSURE: 78 MMHG | HEART RATE: 72 BPM | RESPIRATION RATE: 16 BRPM | OXYGEN SATURATION: 97 % | SYSTOLIC BLOOD PRESSURE: 132 MMHG

## 2019-01-16 PROCEDURE — 3331090002 HH PPS REVENUE DEBIT

## 2019-01-16 PROCEDURE — 3331090001 HH PPS REVENUE CREDIT

## 2019-01-16 PROCEDURE — G0157 HHC PT ASSISTANT EA 15: HCPCS

## 2019-01-17 ENCOUNTER — TELEPHONE (OUTPATIENT)
Dept: FAMILY MEDICINE CLINIC | Age: 84
End: 2019-01-17

## 2019-01-17 PROCEDURE — 3331090001 HH PPS REVENUE CREDIT

## 2019-01-17 PROCEDURE — 3331090002 HH PPS REVENUE DEBIT

## 2019-01-17 NOTE — TELEPHONE ENCOUNTER
Caller stated John Izaguirre was asking about long term care and how long she will be able to keep the pt at home with 24/7 care. Caller was asking for an order for .  I advised we have a SW and I will let her know to contact Arabella Veronica

## 2019-01-18 ENCOUNTER — DOCUMENTATION ONLY (OUTPATIENT)
Dept: FAMILY MEDICINE CLINIC | Age: 84
End: 2019-01-18

## 2019-01-18 PROCEDURE — 3331090002 HH PPS REVENUE DEBIT

## 2019-01-18 PROCEDURE — 3331090001 HH PPS REVENUE CREDIT

## 2019-01-19 PROCEDURE — 3331090001 HH PPS REVENUE CREDIT

## 2019-01-19 PROCEDURE — 3331090002 HH PPS REVENUE DEBIT

## 2019-01-20 PROCEDURE — 3331090002 HH PPS REVENUE DEBIT

## 2019-01-20 PROCEDURE — 3331090001 HH PPS REVENUE CREDIT

## 2019-01-21 ENCOUNTER — HOME CARE VISIT (OUTPATIENT)
Dept: SCHEDULING | Facility: HOME HEALTH | Age: 84
End: 2019-01-21
Payer: MEDICARE

## 2019-01-21 PROCEDURE — 3331090001 HH PPS REVENUE CREDIT

## 2019-01-21 PROCEDURE — 3331090002 HH PPS REVENUE DEBIT

## 2019-01-22 PROCEDURE — 3331090002 HH PPS REVENUE DEBIT

## 2019-01-22 PROCEDURE — 3331090001 HH PPS REVENUE CREDIT

## 2019-01-23 ENCOUNTER — HOME CARE VISIT (OUTPATIENT)
Dept: HOME HEALTH SERVICES | Facility: HOME HEALTH | Age: 84
End: 2019-01-23
Payer: MEDICARE

## 2019-01-23 PROCEDURE — 3331090001 HH PPS REVENUE CREDIT

## 2019-01-23 PROCEDURE — 3331090002 HH PPS REVENUE DEBIT

## 2019-01-24 PROCEDURE — 3331090002 HH PPS REVENUE DEBIT

## 2019-01-24 PROCEDURE — 3331090001 HH PPS REVENUE CREDIT

## 2019-01-25 PROCEDURE — 3331090001 HH PPS REVENUE CREDIT

## 2019-01-25 PROCEDURE — 3331090002 HH PPS REVENUE DEBIT

## 2019-01-26 PROCEDURE — 3331090002 HH PPS REVENUE DEBIT

## 2019-01-26 PROCEDURE — 3331090001 HH PPS REVENUE CREDIT

## 2019-01-27 PROCEDURE — 3331090002 HH PPS REVENUE DEBIT

## 2019-01-27 PROCEDURE — 3331090001 HH PPS REVENUE CREDIT

## 2019-01-28 ENCOUNTER — HOME CARE VISIT (OUTPATIENT)
Dept: SCHEDULING | Facility: HOME HEALTH | Age: 84
End: 2019-01-28
Payer: MEDICARE

## 2019-01-28 PROCEDURE — 3331090001 HH PPS REVENUE CREDIT

## 2019-01-28 PROCEDURE — 3331090003 HH PPS REVENUE ADJ

## 2019-01-28 PROCEDURE — G0151 HHCP-SERV OF PT,EA 15 MIN: HCPCS

## 2019-01-28 PROCEDURE — 3331090002 HH PPS REVENUE DEBIT

## 2019-01-29 ENCOUNTER — OFFICE VISIT (OUTPATIENT)
Dept: FAMILY MEDICINE CLINIC | Age: 84
End: 2019-01-29

## 2019-01-29 VITALS
OXYGEN SATURATION: 98 % | RESPIRATION RATE: 18 BRPM | HEART RATE: 72 BPM | DIASTOLIC BLOOD PRESSURE: 80 MMHG | SYSTOLIC BLOOD PRESSURE: 130 MMHG | TEMPERATURE: 98.4 F

## 2019-01-29 VITALS
OXYGEN SATURATION: 95 % | SYSTOLIC BLOOD PRESSURE: 149 MMHG | DIASTOLIC BLOOD PRESSURE: 70 MMHG | HEART RATE: 69 BPM | RESPIRATION RATE: 16 BRPM | TEMPERATURE: 96.9 F

## 2019-01-29 DIAGNOSIS — S12.691S OTHER CLOSED NONDISPLACED FRACTURE OF SEVENTH CERVICAL VERTEBRA, SEQUELA: Primary | ICD-10-CM

## 2019-01-29 DIAGNOSIS — M05.79 RHEUMATOID ARTHRITIS INVOLVING MULTIPLE SITES WITH POSITIVE RHEUMATOID FACTOR (HCC): ICD-10-CM

## 2019-01-29 DIAGNOSIS — Z71.89 ENCOUNTER FOR MEDICATION REVIEW AND COUNSELING: ICD-10-CM

## 2019-01-29 DIAGNOSIS — K59.03 DRUG-INDUCED CONSTIPATION: ICD-10-CM

## 2019-01-29 PROCEDURE — 3331090001 HH PPS REVENUE CREDIT

## 2019-01-29 PROCEDURE — 3331090002 HH PPS REVENUE DEBIT

## 2019-01-29 NOTE — PROGRESS NOTES
Home Based [facility]  
[phone] Date of Current Visit: 01/30/19 SUMMARY:  
For full summary of patient's condition, please see Home Based Primary Care initial visit note on 12/3/18. This patient's chronic conditions including Juvenile rheumatoid arthritis with bed confinement have resulted in the inability to leave the home to receive primary medical care without a significant taxing effort. Today we are visiting the patient for the following reason: Follow up UTI, chronic pain, constipation GOALS OF CARE / TREATMENT PREFERENCES:  
GOALS OF CARE: 
Patient / health care proxy stated goals: To get out of bed again CODE STATUS: DDNR (on file) Advance Care Planning : This is what you have shared with us about Advance Care Planning Primary Decision Maker (Health Care Agent): Negrita Gonzalez Relationship to patient: Cousin Phone number:  [phone] [x] Named in a scanned document  
[] Legal Next of Kin 
[] Guardian ACP documents you current have include: 
[x] Advance Directive or Living Will 
[x] Durable Do Not Resuscitate 
[] Physician Orders for Scope of Treatment (POST) [] Medical Power of  
[] Other DIAGNOSES & PLAN:  
 
  ICD-10-CM ICD-9-CM 1. Other closed nondisplaced fracture of seventh cervical vertebra, sequela S12.691S 905.1 2. Rheumatoid arthritis involving multiple sites with positive rheumatoid factor (HCC) M05.79 714.0 3. Encounter for medication review and counseling Z71.89 V65.49   
4. Drug-induced constipation K59.03 564.09   
  E980.5 Patient Instructions Dear Wilbert Guadarrama , It was a pleasure seeing you at home today with Home Based [address] [place], [place] [address]e [facility] ([phone]) Your stated goal: To get out of bed again This is what we talked about: 1. Chronic pain due to RA and cervical fractures - MS Contin medication medication and pill bottle are missing Archana Alaniz and I discussed the severity of this - Care Sourav/ Pamela Irvin was notified of missing opioid medication and will escalate internally - Going forward, a lock box will be required Archana Alaniz, please continue to maintain your own counts of the pain medications as we will at each visit - The pharmacy agreed to fill the MS Contin refill early - We also discussed a better tracking system for the private caregivers to chart the medications given 2. Constipation - Pericolace should be taken 2 tabs twice a day - Currently you are using it once a day - If no bowel movement in 3 days, please use Miralax - If you continue to have no improvement in bowel movement frequency, additional adjustments may be made 3. Equipment needs - You have received your new hospital bed and caterina  Lift - Home health is working with you to use the equipment properly 4. Health Maintenance - The flu and pneumonia vaccines were completed on 12/20/18 
- You are now up to date There are no preventive care reminders to display for this patient. 5. Advance Care Planning This is what you have shared with us about Advance Care Planning Primary Decision Maker (Health Care Agent): Lyle Alicea Relationship to patient: Cousin Phone number:  [phone] [x] Named in a scanned document  
[] Legal Next of Kin 
[] Guardian ACP documents you current have include: 
[x] Advance Directive or Living Will 
[x] Durable Do Not Resuscitate 
[] Physician Orders for Scope of Treatment (POST) [] Medical Power of  
[] Other Someone from the Home Based Primary Care Team will see you again in 2-3 weeks. If there are any concerns before that time, such as medication questions, worsening symptoms or a need to see a physician for an urgent or emergent situation; please call [phone].  A physician is also on call after our normal business hours of 8am to 5pm.  
 
 In order to serve you better, please allow up to 48 hours for prescription refills to be processed. Certain medications may require more paperwork or a written prescription that you may need to  from the office. We appreciate you letting us know of any refill requests as soon as possible. Sincerely, The Home Based Primary Care Team 
 
MD Darcy Burks NP Loanne Catalan, NP [place], RN [place], RN Learning About Safely Storing and Getting Rid of Opioid Pills and Patches Why are opioid pills and patches dangerous? Opioids are medicines used to relieve moderate to severe pain. They may be used for a short time for pain, such as after surgery. Or they may be used to relieve long-term pain. When your doctor prescribes an opioid, you're getting strong medicine. It's important to protect others from its risks. Opioid medicine can cause serious problems, and even death, if it's misused. Children and pets are at high risk when an opioid is kept within their reach. Opioid skin patches, such as fentanyl, are the most dangerous. Even a used patch still has a high dose of medicine in it. Small children have been killed by opioid patches they've found in the trash at home. Opioids can also be abused or stolen. Be sure to store your medicine in a safe and secure place. When you are done using opioid medicine, get rid of it right away, and in the safest way you can. How do you safely store opioid pills and patches? It's important to store opioids safely so that they aren't used by the wrong person. Your pain medicine is only for you to take. If someone else takes your medicine, it can harm that person. You can safely store your medicine. Follow these tips. · Store pills and patches up high and out of sight. ? Keep them away from children and pets. ? Return the container to the same place each time you take your medicine. · Try locking your opioid medicine in a cabinet. · Make sure the bottles are closed tightly. If they have a safety cap, make sure that it's locked. Tighten the cap until you hear a click or can't twist it anymore. · Keep track of how many pills or patches you have left. You may want to keep track in a notebook. · Let the people who live with you know about your medicine. ? Tell them that it is only for you to take. ? If guests have opioid medicine with them, ask them to keep it safe. How do you safely get rid of opioid pills and patches? If you have opioid pills or patches that you are not going to use, get rid of them right away. The U.S. Food and Drug Administration (FDA) recommends that you take your opioid pills and patches to a drop-off box or take-back program that is authorized by the [address] [place] [address] (ZOEY). If you can't get to a ZOEY-authorized site right away and your medicine doesn't have specific disposal information (such as flushing), you can dispose of them in your household trash using these steps. · Take the medicine out of its container. · Mix it with something that tastes bad, such as cat litter or coffee grounds. · Place the mixture in a sealed plastic bag, and put the bag in your household trash. Only flush your medicine down the toilet if you can't get to a ZOEY-approved site or if your medicine instructions state clearly to flush them. Go to www.fda.gov/Drugs/ResourcesForYou/Consumers/BuyingUsingMedicineSafely/EnsuringSafeUseofMedicine/SafeDisposalofMedicines/aie649332.htm to see a list of medicines that should be flushed. Take special care with used opioid patches. As soon as you peel a patch off of your skin, fold it in half with the sticky sides together. Immediately take it to a ZOEY-authorized site or flush it down the toilet if a ZOEY-authorized site isn't available in your area. Do not throw them in the trash. Where can you go to learn more? To learn how and where to get rid of unused medicines in your area, ask your doctor or pharmacist for help. Your local trash and recycle center may have a drop-off site. You can also look online at the [employer] website ([address]. [employer]) to find a disposal site near you. Or you can visit fda.gov and search for \"unused medicine disposal.\" Follow-up care is a key part of your treatment and safety. Be sure to make and go to all appointments, and call your doctor if you are having problems. It's also a good idea to know your test results and keep a list of the medicines you take. Where can you learn more? Go to http://félix-ho.info/. Enter S140 in the search box to learn more about \"Learning About Safely Storing and Getting Rid of Opioid Pills and Patches. \" Current as of: Tuyet 3, 2018 Content Version: 11.9 © 0099-0194 [employer]. Care instructions adapted under license by [employer] (which disclaims liability or warranty for this information). If you have questions about a medical condition or this instruction, always ask your healthcare professional. [place] [address] any warranty or liability for your use of this information. HISTORY:  
HISTORY OBTAINED FROM:  
 
CHIEF COMPLAINT:  
Chief Complaint Patient presents with  Arthritis  Pain (Chronic)  Medication Evaluation HPI/SUBJECTIVE:  
Patel Nixon RTC today to follow up on chronic pain diagnosis. We discussed his rheumatoid arthritis that is affecting his back and neck. Significant changes since last visit: none. He Medications exceed 50 MME/day. Will continue on current dose of medications as coarse has been stable and there are no signs of overuse, misuse, diversion, or concerning side effects  able to do his normal daily activities. He reports the following adverse side effects: none. Least pain over the last week has been 3/10. Worst pain over the last week has been 8/10. Opioid Risk Tool Reviewed: YES Aberrant behaviors: None. Urine Drug Screen: unable to collect. Controlled substance agreement on file: NO: .  reviewed:yes Pill count is consistent with his prescription: No - missing pill bottle for MS contin. Medication not in the home to count. Britney Blankenship arrived to reconcile missing medication. Unable to locate in the home or in any location where medications are kept. Per alexei, there were 10 MS contin pills she counted as of last Wednesday when she filled the medication boxes. Patient has paid private caregivers that provider 24hr care for Mr. Sulema Rinaldi by [employer]. [employer] manager, Cindy Houston, was notified and will escalate internally. Suspect medication diversion by private provider. Concomitant use of a benzodiazepine: no 
 
Medications exceed 50 MME/day. Will continue on current dose of medications as coarse has been stable and there are no signs of overuse, misuse, diversion, or concerning side effects Will discuss with POA/Family naloxone rx Recent Fall: [x] No / [] Yes (when):   
Last bowel movement: yesterday REVIEW OF SYSTEMS:  
 
The following systems were [x] reviewed / [] unable to be reviewed Systems: constitutional, ears/nose/mouth/throat, respiratory, gastrointestinal, genitourinary, musculoskeletal, integumentary, neurologic, psychiatric, endocrine. Positive findings noted below: constipation, chronic pain, VITAL SIGNS, PHYSICAL EXAM & FUNCTIONAL ASSESSMENT Vital Signs: Wt Readings from Last 3 Encounters:  
No data found for [employer] Temp Readings from Last 3 Encounters:  
01/29/19 96.9 °F (36.1 °C) (Axillary) 01/28/19 98.4 °F (36.9 °C) (Temporal) 01/16/19 98.6 °F (37 °C) (Temporal) BP Readings from Last 3 Encounters:  
01/29/19 149/70  
01/28/19 130/80  
01/16/19 132/78 Pulse Readings from Last 3 Encounters:  
01/29/19 69  
01/28/19 72  
01/16/19 72 Physical Exam: 
 
Constitutional: cacuasian male, sitting up in hospital bed, NAD Eyes: pupils equal, anicteric ENMT: EAC's cleal bilat, no nasal discharge, moist mucous membranes Cardiovascular: regular rhythm, distal pulses intact Respiratory: breathing not labored, symmetric, CTA bilat Gastrointestinal: soft, mild SP TTP, +bowel sounds Musculoskeletal:  no tenderness to palpation Skin: warm, dry, no open wounds, rashes or ulcerations Neurologic: a/ox3 but forgetful and often repetitive, poor recall at times, following commands, moving all extremities Psychiatric: normal affect, no hallucinations Other: MS contin filled #30 on 1/2/19 = BOTTLE MISSING, MEDICATION IS UNAVAILABLE TO COUNT Oxycodone #120 on 12/18/18 = 52 remaining on 1/29/19 Pill count is consistent with his prescription: No - missing pill bottle for MS contin. Medication not in the home to count. Akua Terry arrived to reconcile missing medication. Unable to locate in the home or in any location where medications are kept. Per alexei, there were 10 MS contin pills she counted as of last Wednesday when she filled the medication boxes. Patient has paid private caregivers that provider 24hr care for Mr. Estanislado [place] by [employer]. Care advantage manager, Arturo Mehta, was notified and will escalate internally. Suspect medication diversion by private provider. Richard Fontanez also aware of medication diversion. Counseling provided on Opioid safety and requirement for lock box going forward. Functional Assessment (description): 
 
Palliative Performance Scale:  50 Timed Up and Go (TUG): N/A Fall Risk Assessment, last 12 mths 12/13/2018 Able to walk? No  
 
 
 LAB DATA REVIEWED:  
 
No results found for: HBA1C, HGBE8, JIF1LJKR, WXM0XTVU, QNI5ZSPP No results found for: Tanvir Callaway, MCA2, [address], MCAU, [address] [facility], [facility] 
 No results found for: TSH, TSH2, TSH3, TSHP, TSHEXT, TSHEXT No results found for: Edilberto Soares, Mariamahéctorvivian Charles, Anai Liz, [id number] Lab Results Component Value Date/Time WBC 8.4 12/13/2018 12:14 PM  
 HGB 13.0 12/13/2018 12:14 PM  
 PLATELET 807 60/32/6587 12:14 PM  
 
Lab Results Component Value Date/Time Sodium 136 12/13/2018 12:14 PM  
 Potassium 4.5 12/13/2018 12:14 PM  
 Chloride 100 12/13/2018 12:14 PM  
 CO2 23 12/13/2018 12:14 PM  
 BUN 4 (L) 12/13/2018 12:14 PM  
 Creatinine 0.43 (L) 12/13/2018 12:14 PM  
 Calcium 8.7 12/13/2018 12:14 PM  
  
Lab Results Component Value Date/Time AST (SGOT) 13 12/13/2018 12:14 PM  
 Alk. phosphatase 70 12/13/2018 12:14 PM  
 Protein, total 6.1 12/13/2018 12:14 PM  
 Albumin 3.2 (L) 12/13/2018 12:14 PM  
 
No results found for: IRON, FE, TIBC, IBCT, PSAT, FERR  
 
 MEDICATIONS & PRESCRIPTIONS Not on File Current Outpatient Medications Medication Sig  DULoxetine (CYMBALTA) 60 mg capsule Take 1 Cap by mouth daily.  oxyCODONE IR (ROXICODONE) 10 mg tab immediate release tablet 10MG PO QAM THEN Q4H PRN PAIN  
 finasteride (PROSCAR) 5 mg tablet Take 1 Tab by mouth daily.  senna-docusate (PERICOLACE) 8.6-50 mg per tablet Take 2 Tabs by mouth two (2) times a day.  morphine CR (MS CONTIN) 15 mg CR tablet Take 1 Tab by mouth nightly. Max Daily Amount: 15 mg.  
 cyclobenzaprine (FLEXERIL) 5 mg tablet Take 1 Tab by mouth three (3) times daily as needed for Muscle Spasm(s).  polyethylene glycol (MIRALAX) 17 gram/dose powder Take 17 g by mouth daily.  bisacodyl (DULCOLAX, BISACODYL,) 10 mg suppository Insert 10 mg into rectum daily as needed (for constipation). No current facility-administered medications for this visit. No orders of the defined types were placed in this encounter. Total time: 60min Counseling / coordination time: 35min -- 
Opioid safety counseling provided and suspect medication diversion. - missing pill bottle for MS contin. Medication not in the home to count. Jules Harrington arrived to reconcile missing medication. Unable to locate in the home or in any location where medications are kept. Per alexei, there were 10 MS contin pills she counted as of last Wednesday when she filled the medication boxes. Patient has paid private caregivers that provider 24hr care for Mr. Pako Omalley by [facility] [employer]. Care advantage manager, Elvis Mera, was notified and will escalate internally through [phone]. Suspect medication diversion by private provider. Lock box requirement explained to [employer]. > 50% counseling / coordination?: yes

## 2019-01-29 NOTE — PATIENT INSTRUCTIONS
Dear Randall Galvan , It was a pleasure seeing you at home today with [facility] [address] ([phone]) Your stated goal: To get out of bed again This is what we talked about: 1. Chronic pain due to RA and cervical fractures - MS Contin medication medication and pill bottle are missing 
Jaime Kohut and I discussed the severity of this - [employer]/ Jignesh Hemphill was notified of missing opioid medication and will escalate internally - Going forward, a lock box will be required Jaime Kohut, please continue to maintain your own counts of the pain medications as we will at each visit - The pharmacy agreed to fill the MS Contin refill early - We also discussed a better tracking system for the private caregivers to chart the medications given 2. Constipation - Pericolace should be taken 2 tabs twice a day - Currently you are using it once a day - If no bowel movement in 3 days, please use Miralax - If you continue to have no improvement in bowel movement frequency, additional adjustments may be made 3. Equipment needs - You have received your new hospital bed and caterina  Lift - Home health is working with you to use the equipment properly 4. Health Maintenance - The flu and pneumonia vaccines were completed on 12/20/18 
- You are now up to date There are no preventive care reminders to display for this patient. 5. Advance Care Planning This is what you have shared with us about Advance Care Planning Primary Decision Maker (Health Care Agent): Renny Araujo Relationship to patient: Cousin Phone number:  [phone] [x] Named in a scanned document  
[] Legal Next of Kin 
[] Guardian ACP documents you current have include: 
[x] Advance Directive or Living Will 
[x] Durable Do Not Resuscitate 
[] Physician Orders for Scope of Treatment (POST) [] Medical Power of  
[] Other Someone from the Home Based Primary Care Team will see you again in 2-3 weeks. If there are any concerns before that time, such as medication questions, worsening symptoms or a need to see a physician for an urgent or emergent situation; please call [phone]. A physician is also on call after our normal business hours of 8am to 5pm.  
 
In order to serve you better, please allow up to 48 hours for prescription refills to be processed. Certain medications may require more paperwork or a written prescription that you may need to  from the office. We appreciate you letting us know of any refill requests as soon as possible. Sincerely, The Home Based Primary Care Team 
 
MD Nicole Mckeon, NP Corey Sandhoff, ELENITA Cherry, GERARD Carrero RN Learning About Safely Storing and Getting Rid of Opioid Pills and Patches Why are opioid pills and patches dangerous? Opioids are medicines used to relieve moderate to severe pain. They may be used for a short time for pain, such as after surgery. Or they may be used to relieve long-term pain. When your doctor prescribes an opioid, you're getting strong medicine. It's important to protect others from its risks. Opioid medicine can cause serious problems, and even death, if it's misused. Children and pets are at high risk when an opioid is kept within their reach. Opioid skin patches, such as fentanyl, are the most dangerous. Even a used patch still has a high dose of medicine in it. Small children have been killed by opioid patches they've found in the trash at home. Opioids can also be abused or stolen. Be sure to store your medicine in a safe and secure place. When you are done using opioid medicine, get rid of it right away, and in the safest way you can. How do you safely store opioid pills and patches? It's important to store opioids safely so that they aren't used by the wrong person. Your pain medicine is only for you to take. If someone else takes your medicine, it can harm that person. You can safely store your medicine. Follow these tips. · Store pills and patches up high and out of sight. ? Keep them away from children and pets. ? Return the container to the same place each time you take your medicine. · Try locking your opioid medicine in a cabinet. · Make sure the bottles are closed tightly. If they have a safety cap, make sure that it's locked. Tighten the cap until you hear a click or can't twist it anymore. · Keep track of how many pills or patches you have left. You may want to keep track in a notebook. · Let the people who live with you know about your medicine. ? Tell them that it is only for you to take. ? If guests have opioid medicine with them, ask them to keep it safe. How do you safely get rid of opioid pills and patches? If you have opioid pills or patches that you are not going to use, get rid of them right away. The U.S. Food and Drug Administration (FDA) recommends that you take your opioid pills and patches to a drop-off box or take-back program that is authorized by the [facility] (ZOEY). If you can't get to a ZOEY-authorized site right away and your medicine doesn't have specific disposal information (such as flushing), you can dispose of them in your household trash using these steps. · Take the medicine out of its container. · Mix it with something that tastes bad, such as cat litter or coffee grounds. · Place the mixture in a sealed plastic bag, and put the bag in your household trash. Only flush your medicine down the toilet if you can't get to a ZOEY-approved site or if your medicine instructions state clearly to flush them. Go to www.fda.gov/Drugs/ResourcesForYou/Consumers/BuyingUsingMedicineSafely/EnsuringSafeUseofMedicine/SafeDisposalofMedicines/pib557912.htm to see a list of medicines that should be flushed. Take special care with used opioid patches.  As soon as you peel a patch off of your skin, fold it in half with the sticky sides together. Immediately take it to a ZOEY-authorized site or flush it down the toilet if a ZOEY-authorized site isn't available in your area. Do not throw them in the trash. Where can you go to learn more? To learn how and where to get rid of unused medicines in your area, ask your doctor or pharmacist for help. Your local trash and recycle center may have a drop-off site. You can also look online at the [employer] website ([employer]. [employer].CheckiO) to find a disposal site near you. Or you can visit fda.gov and search for \"unused medicine disposal.\" Follow-up care is a key part of your treatment and safety. Be sure to make and go to all appointments, and call your doctor if you are having problems. It's also a good idea to know your test results and keep a list of the medicines you take. Where can you learn more? Go to http://félix-ho.info/. Enter V045 in the search box to learn more about \"Learning About Safely Storing and Getting Rid of Opioid Pills and Patches. \" Current as of: Tuyet 3, 2018 Content Version: 11.9 © 2847-3535 [employer]. Care instructions adapted under license by [employer] (which disclaims liability or warranty for this information). If you have questions about a medical condition or this instruction, always ask your healthcare professional. [place] [address] any warranty or liability for your use of this information.

## 2019-01-31 ENCOUNTER — TELEPHONE (OUTPATIENT)
Dept: FAMILY MEDICINE CLINIC | Age: 84
End: 2019-01-31

## 2019-01-31 ENCOUNTER — DOCUMENTATION ONLY (OUTPATIENT)
Dept: FAMILY MEDICINE CLINIC | Age: 84
End: 2019-01-31
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
## 2019-02-04 ENCOUNTER — OFFICE VISIT (OUTPATIENT)
Dept: FAMILY MEDICINE CLINIC | Age: 84
End: 2019-02-04

## 2019-02-04 ENCOUNTER — TELEPHONE (OUTPATIENT)
Dept: FAMILY MEDICINE CLINIC | Age: 84
End: 2019-02-04

## 2019-02-04 VITALS
DIASTOLIC BLOOD PRESSURE: 81 MMHG | RESPIRATION RATE: 16 BRPM | TEMPERATURE: 98.1 F | HEART RATE: 87 BPM | OXYGEN SATURATION: 97 % | SYSTOLIC BLOOD PRESSURE: 145 MMHG

## 2019-02-04 DIAGNOSIS — G89.4 CHRONIC PAIN SYNDROME: ICD-10-CM

## 2019-02-04 DIAGNOSIS — M45.0 ANKYLOSING SPONDYLITIS OF MULTIPLE SITES IN SPINE (HCC): ICD-10-CM

## 2019-02-04 DIAGNOSIS — S12.691S OTHER CLOSED NONDISPLACED FRACTURE OF SEVENTH CERVICAL VERTEBRA, SEQUELA: Primary | ICD-10-CM

## 2019-02-04 DIAGNOSIS — Z71.89 ENCOUNTER FOR MEDICATION REVIEW AND COUNSELING: ICD-10-CM

## 2019-02-04 DIAGNOSIS — M05.79 RHEUMATOID ARTHRITIS INVOLVING MULTIPLE SITES WITH POSITIVE RHEUMATOID FACTOR (HCC): ICD-10-CM

## 2019-02-04 RX ORDER — NALOXONE HYDROCHLORIDE 4 MG/.1ML
SPRAY NASAL
Qty: 1 EACH | Refills: 1 | Status: SHIPPED | OUTPATIENT
Start: 2019-02-04

## 2019-02-04 NOTE — TELEPHONE ENCOUNTER
Caller asking if Vale Kong can give her a call when she is 15 minutes away from the pt's home so she can meet her there
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
## 2019-02-04 NOTE — PATIENT INSTRUCTIONS
Dear Chio Sykes , It was a pleasure seeing you at home today with [facility] [address] ([phone]) Your stated goal: To get out of bed again This is what we talked about: 1. 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 Medication Safety - We reviewed medication safety in the home including a lock box - A lock box has been purchased and will be kept in a safe place 
- On-going medication counts will be kept 3. Health Maintenance - You are now up to date There are no preventive care reminders to display for this patient. 4. Advance Care Planning This is what you have shared with us about Advance Care Planning Primary Decision Maker (Health Care Agent): Lyle Alicea Relationship to patient: Cousin Phone number:  [phone] [x] Named in a scanned document  
[] Legal Next of Kin 
[] Guardian ACP documents you current have include: 
[x] Advance Directive or Living Will 
[x] Durable Do Not Resuscitate 
[] Physician Orders for Scope of Treatment (POST) [] Medical Power of  
[] Other Someone from the [facility] Primary Care Team will see you again in 2 weeks If there are any concerns before that time, such as medication questions, worsening symptoms or a need to see a physician for an urgent or emergent situation; please call [phone]. A physician is also on call after our normal business hours of 8am to 5pm.  
 
In order to serve you better, please allow up to 48 hours for prescription refills to be processed. Certain medications may require more paperwork or a written prescription that you may need to  from the office.  We appreciate you letting us know of any refill requests as soon as possible. Sincerely, The [facility] Team 
 
MD Karin Shah NP Tasia Falco, NP Frazier Bitters, GERARD Mckeon RN Learning About Naloxone for Opioid Overdose What is naloxone? Opioids are strong pain medicines. Examples include hydrocodone, oxycodone, fentanyl, and morphine. Heroin is an example of an illegal opioid. Taking too much of an opioid can cause death. An overdose is an emergency. Naloxone is a medicine that reverses the effects of an overdose. If you take it soon enough after an overdose, it can save your life. Naloxone comes in a rescue kit you can carry with you. You may hear it called a Narcan kit for short. The rescue kit may contain: · The medicine. · Syringes and needles. · A nasal adapter for the syringes. · A separate nasal spray device. Your doctor can give you a prescription for a rescue kit and show you how to use it. In some places you can buy Narcan kits without a prescription. When is naloxone used? Naloxone is used when a person shows signs of an opioid overdose. A person may have overdosed if he or she is: · Sleepy or hard to wake up. · Confused. · Not breathing normally. Make sure your family and friends know about these signs of an overdose. If someone appears to have overdosed, call 911. A drug overdose is an emergency. How do you use it? If you overdose, you may not be able to give yourself the medicine. So it's very important that your friends and family know how and when to give it to you. Rescue kits come with instructions. There are two ways to give the medicine. Many rescue kits can be used for either method. The methods are: · Injection with needle and syringe. ? Training may be needed in order to use this method correctly. ? Some rescue kits come with automatic injectors that don't require special training. ? The medicine can be injected through clothing. · Nasal spray. ? Many rescue kits come with a nasal adapter. It attaches to the syringe and turns the medicine into a mist. 
? The mist is sprayed into the nose of a person who has overdosed. The person needs to be lying down when the mist is sprayed. Overdose symptoms may return a few minutes after the first dose from the rescue kit. If that happens, a second dose is needed. Rescue kits come with two doses for that reason. Keep your rescue kit with you always. You never know when you might need it. If you think you or someone else may have overdosed but you're not sure, use the kit anyway. Aside from going through withdrawal, which may be uncomfortable, there is no downside to using this medicine. Always go to the emergency room after using naloxone. Doctors will want to make sure the overdose has been reversed. Follow-up care is a key part of your treatment and safety. Be sure to make and go to all appointments, and call your doctor if you are having problems. It's also a good idea to know your test results and keep a list of the medicines you take. Where can you learn more? Go to http://félix-ho.info/. Enter I176 in the search box to learn more about \"Learning About Naloxone for Opioid Overdose. \" Current as of: May 7, 2018 Content Version: 11.9 © 3976-6510 [employer]. Care instructions adapted under license by [employer] (which disclaims liability or warranty for this information). If you have questions about a medical condition or this instruction, always ask your healthcare professional. [place] [address] any warranty or liability for your use of this information.

## 2019-02-13 LAB
AMPHETAMINES UR QL SCN: NORMAL NG/ML
BARBITURATES UR QL SCN: NORMAL
BENZODIAZ UR QL: NORMAL
BZE UR QL: NORMAL
CANNABINOIDS UR QL SCN: NORMAL
MDMA UR QL SCN: NORMAL
METHADONE UR QL SCN: NORMAL
METHAQUALONE UR QL: NORMAL
OPIATES UR QL: NORMAL
PCP UR QL: NORMAL
PROPOXYPH UR QL SCN: NORMAL

## 2019-02-14 ENCOUNTER — TELEPHONE (OUTPATIENT)
Dept: FAMILY MEDICINE CLINIC | Age: 84
End: 2019-02-14

## 2019-02-18 ENCOUNTER — HOME VISIT (OUTPATIENT)
Dept: FAMILY MEDICINE CLINIC | Age: 84
End: 2019-02-18

## 2019-02-18 VITALS
HEART RATE: 61 BPM | OXYGEN SATURATION: 94 % | TEMPERATURE: 97.7 F | RESPIRATION RATE: 16 BRPM | SYSTOLIC BLOOD PRESSURE: 126 MMHG | DIASTOLIC BLOOD PRESSURE: 65 MMHG

## 2019-02-18 DIAGNOSIS — S12.691K: ICD-10-CM

## 2019-02-18 DIAGNOSIS — M06.09 RHEUMATOID ARTHRITIS OF MULTIPLE SITES WITH NEGATIVE RHEUMATOID FACTOR (HCC): ICD-10-CM

## 2019-02-18 DIAGNOSIS — K59.03 DRUG-INDUCED CONSTIPATION: Primary | ICD-10-CM

## 2019-02-18 DIAGNOSIS — M79.672 ACUTE FOOT PAIN, LEFT: ICD-10-CM

## 2019-02-18 PROBLEM — S12.601A CLOSED NONDISPLACED FRACTURE OF SEVENTH CERVICAL VERTEBRA (HCC): Status: ACTIVE | Noted: 2019-01-09

## 2019-02-18 RX ORDER — OXYCODONE HYDROCHLORIDE 10 MG/1
TABLET ORAL
Qty: 90 TAB | Refills: 0
Start: 2019-02-18 | End: 2019-03-27 | Stop reason: SDUPTHER

## 2019-02-18 RX ORDER — MORPHINE SULFATE 15 MG/1
15 TABLET, FILM COATED, EXTENDED RELEASE ORAL EVERY 12 HOURS
Qty: 30 TAB | Refills: 0
Start: 2019-03-30

## 2019-02-18 RX ORDER — MORPHINE SULFATE 15 MG/1
15 TABLET, FILM COATED, EXTENDED RELEASE ORAL
Qty: 30 TAB | Refills: 0
Start: 2019-02-28 | End: 2019-03-27 | Stop reason: SDUPTHER

## 2019-02-18 NOTE — PROGRESS NOTES
[facility] [address] [facility]  
[phone] Date of Current Visit: 02/18/19 SUMMARY:  
For full summary of patient's condition, please see [facility] initial visit note on 12/3/18. This patient's chronic conditions including Juvenile rheumatoid arthritis with bed confinement have resulted in the inability to leave the home to receive primary medical care without a significant taxing effort. Today we are visiting the patient for the following reason: Chronic pain management, constipation, new left foot pain GOALS OF CARE / TREATMENT PREFERENCES:  
GOALS OF CARE: 
Patient / health care proxy stated goals: To get out of bed again CODE STATUS: DDNR (on file) Advance Care Planning : This is what you have shared with us about Advance Care Planning Primary Decision Maker (Health Care Agent): Renny Araujo Relationship to patient: Cousin Phone number:  [phone] [x] Named in a scanned document  
[] Legal Next of Kin 
[] Guardian ACP documents you current have include: 
[x] Advance Directive or Living Will 
[x] Durable Do Not Resuscitate 
[] Physician Orders for Scope of Treatment (POST) [] Medical Power of  
[] Other DIAGNOSES & PLAN:  
 
  ICD-10-CM ICD-9-CM 1. Drug-induced constipation K59.03 564.09   
  E980.5 2. Rheumatoid arthritis of multiple sites with negative rheumatoid factor (HCC) M06.09 714.0 morphine CR (MS CONTIN) 15 mg CR tablet  
   morphine CR (MS CONTIN) 15 mg CR tablet  
   oxyCODONE IR (ROXICODONE) 10 mg tab immediate release tablet 3. 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   
4. Acute foot pain, left M79.672 729.5 Patient Instructions Dear Randall Galvan , It was a pleasure seeing you at home today with [facility] Rahul Merrill Sumit ([phone]) Your stated goal: To get out of bed again This is what we talked about: 1. 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 
- MS contin refills were left for 2/28/19 and 3/30/19 
- Oxy IR refill was left for #90 tabs which he may have his scheduled morning dose then 2 additional doses during the day if needed 2. Foot Pain - You have recently used more of the Oxy Ir (short acting pain medicaiton) due to left foot pain - This started last week after it \"got twisted\" while in your wheelchair - There is no swelling, bruising or abnormalities on todays exam 
- If the pain becomes worse, we may consider an xray - Ice and Tylenol are recommended as first steps for pain control 3. Constipation - It has been 4 days since your last BM 
- The senna is not being used regularly and frequently refused - Please resume taking the senna in the AM and PM 
- Start Miralax today - We discussed the importance of regular BM's and intervening by day 3 at the latest if you no BM 
- Increased use of your pain medication can worsen constipation 4. Health Maintenance - You are now up to date There are no preventive care reminders to display for this patient. 5. Advance Care Planning This is what you have shared with us about Advance Care Planning Primary Decision Maker (Health Care Agent): Perez Baltazar Relationship to patient: Cousin Phone number:  [phone] [x] Named in a scanned document  
[] Legal Next of Kin 
[] Guardian ACP documents you current have include: 
[x] Advance Directive or Living Will 
[x] Durable Do Not Resuscitate 
[] Physician Orders for Scope of Treatment (POST) [] Medical Power of  
[] Other Someone from the Home Based Primary Care Team will see you again in 6 weeks If there are any concerns before that time, such as medication questions, worsening symptoms or a need to see a physician for an urgent or emergent situation; please call [phone]. A physician is also on call after our normal business hours of 8am to 5pm.  
 
In order to serve you better, please allow up to 48 hours for prescription refills to be processed. Certain medications may require more paperwork or a written prescription that you may need to  from the office. We appreciate you letting us know of any refill requests as soon as possible. Sincerely, The [facility] Team 
 
MD Jaleesa Rajput, ELENITA Clark, ELENITA Martinez, GERARD Lyons, RN Constipation: Care Instructions Your Care Instructions Constipation means that you have a hard time passing stools (bowel movements). People pass stools from 3 times a day to once every 3 days. What is normal for you may be different. Constipation may occur with pain in the rectum and cramping. The pain may get worse when you try to pass stools. Sometimes there are small amounts of bright red blood on toilet paper or the surface of stools. This is because of enlarged veins near the rectum (hemorrhoids). A few changes in your diet and lifestyle may help you avoid ongoing constipation. Your doctor may also prescribe medicine to help loosen your stool. Some medicines can cause constipation. These include pain medicines and antidepressants. Tell your doctor about all the medicines you take. Your doctor may want to make a medicine change to ease your symptoms. Follow-up care is a key part of your treatment and safety. Be sure to make and go to all appointments, and call your doctor if you are having problems. It's also a good idea to know your test results and keep a list of the medicines you take. How can you care for yourself at home?  
· Drink plenty of fluids, enough so that your urine is light yellow or clear like water. If you have kidney, heart, or liver disease and have to limit fluids, talk with your doctor before you increase the amount of fluids you drink. · Include high-fiber foods in your diet each day. These include fruits, vegetables, beans, and whole grains. · Get at least 30 minutes of exercise on most days of the week. Walking is a good choice. You also may want to do other activities, such as running, swimming, cycling, or playing tennis or team sports. · Take a fiber supplement, such as Citrucel or Metamucil, every day. Read and follow all instructions on the label. · Schedule time each day for a bowel movement. A daily routine may help. Take your time having your bowel movement. · Support your feet with a small step stool when you sit on the toilet. This helps flex your hips and places your pelvis in a squatting position. · Your doctor may recommend an over-the-counter laxative to relieve your constipation. Examples are Milk of Magnesia and MiraLax. Read and follow all instructions on the label. Do not use laxatives on a long-term basis. When should you call for help? Call your doctor now or seek immediate medical care if: 
  · You have new or worse belly pain.  
  · You have new or worse nausea or vomiting.  
  · You have blood in your stools.  
 Watch closely for changes in your health, and be sure to contact your doctor if: 
  · Your constipation is getting worse.  
  · You do not get better as expected. Where can you learn more? Go to http://félix-ho.info/. Enter 21 [phone] in the search box to learn more about \"Constipation: Care Instructions. \" Current as of: September 23, 2018 Content Version: 11.9 © 7513-6312 [employer]. Care instructions adapted under license by [employer] (which disclaims liability or warranty for this information).  If you have questions about a medical condition or this instruction, always ask your healthcare professional. [place] [address] any warranty or liability for your use of this information. HISTORY:  
HISTORY OBTAINED FROM:  Patient and cousin CHIEF COMPLAINT:  
Chief Complaint Patient presents with  Pain (Chronic)  Arthritis  Foot Pain  
  left foot, superfical injury  Constipation HPI/SUBJECTIVE:  
 
No BM since 19. Pt frequently refusing senna. New left foot pain that started approx. 3-4 days ago after it \"got twisted\" while up in wheel chair. Using oxy IR intermittently for relief. [facility] pain [facility] to follow up on chronic pain diagnosis. We discussed his arthralgias, rheumatoid arthritis and cervical fractures that is affecting his left foot and multiple joint sites. Significant changes since last visit: none. He is  able to do his normal daily activities. He reports the following adverse side effects: constipation. Least pain over the last week has been 3/10. Worst pain over the last week has been 8/10. Opioid Risk Tool Reviewed: YES Aberrant behaviors: None. Urine Drug Screen: reviewed and up to date. Controlled substance agreement on file: YES.  reviewed:yes Pill count is consistent with his prescription: yes Concomitant use of a benzodiazepine: no 
 
does not exceed >50MME/day Naloxone prescription is warranted. It has been provided or is already on file. 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   
Last bowel movement: 19 REVIEW OF SYSTEMS:  
 
The following systems were [x] reviewed / [] unable to be reviewed Systems: constitutional, ears/nose/mouth/throat, respiratory, gastrointestinal, genitourinary, musculoskeletal, integumentary, neurologic, psychiatric, endocrine. Positive findings noted below:  chronic pain, left foot pain x 3-4 days, tender to touch VITAL SIGNS, PHYSICAL EXAM & FUNCTIONAL ASSESSMENT Vital Signs: Wt Readings from Last 3 Encounters:  
No data found for [employer] Temp Readings from Last 3 Encounters:  
02/18/19 97.7 °F (36.5 °C) (Temporal) 02/04/19 98.1 °F (36.7 °C) (Temporal) 01/29/19 96.9 °F (36.1 °C) (Axillary) BP Readings from Last 3 Encounters:  
02/18/19 126/65  
02/04/19 145/81  
01/29/19 149/70 Pulse Readings from Last 3 Encounters:  
02/18/19 61  
02/04/19 87  
01/29/19 69 Physical Exam: 
 
Constitutional: cacuasian male, sitting up in hospital bed, NAD Eyes: pupils equal, anicteric ENMT: EAC's cleal bilat, no nasal discharge, moist mucous membranes Cardiovascular: regular rhythm, distal pulses intact Respiratory: breathing not labored, symmetric, CTA bilat Gastrointestinal: soft, mild SP TTP, +bowel sounds Musculoskeletal:   tenderness to palpation present on left foot, no visible edema, edema or acute abnormaility Skin: warm, dry, no open wounds, rashes or ulcerations Neurologic: a/ox2 but forgetful and often repetitive, poor recall at times, following commands, moving all extremities Psychiatric: normal affect, no hallucinations Other: MS contin filled #30 on 1/29/19 = 10 tabs remaining Oxycodone #120 on 12/18/18 =  7 remaining Right MUAC 2/4/19= 25cm Right MUAC 2/18/19 = 25cm Functional Assessment (description): 
 
Palliative Performance Scale:  50 Timed Up and Go (TUG): N/A Fall Risk Assessment, last 12 mths 12/13/2018 Able to walk? No  
 
 
 LAB DATA REVIEWED:  
 
No results found for: HBA1C, HGBE8, ZKM6ZZVB, VEG2JEHC, ZFV4ANOX No results found for: Carito Kos, MCA2, [address], MCAU, [address], MCALPOCT No results found for: TSH, TSH2, TSH3, TSHP, TSHEXT, TSHEXT No results found for: Cr Christiansen, Mitzi Arenas, [place], VD3RIA Lab Results Component Value Date/Time  WBC 8.4 12/13/2018 12:14 PM  
 HGB 13.0 12/13/2018 12:14 PM  
 PLATELET 963 04/95/5272 12:14 PM  
 
Lab Results Component Value Date/Time Sodium 136 12/13/2018 12:14 PM  
 Potassium 4.5 12/13/2018 12:14 PM  
 Chloride 100 12/13/2018 12:14 PM  
 CO2 23 12/13/2018 12:14 PM  
 BUN 4 (L) 12/13/2018 12:14 PM  
 Creatinine 0.43 (L) 12/13/2018 12:14 PM  
 Calcium 8.7 12/13/2018 12:14 PM  
  
Lab Results Component Value Date/Time AST (SGOT) 13 12/13/2018 12:14 PM  
 Alk. phosphatase 70 12/13/2018 12:14 PM  
 Protein, total 6.1 12/13/2018 12:14 PM  
 Albumin 3.2 (L) 12/13/2018 12:14 PM  
 
No results found for: IRON, FE, TIBC, IBCT, PSAT, FERR  
 
 MEDICATIONS & PRESCRIPTIONS No Known Allergies Current Outpatient Medications Medication Sig  [START ON 2/28/2019] morphine CR (MS CONTIN) 15 mg CR tablet Take 1 Tab by mouth nightly for 30 days. Max Daily Amount: 15 mg.  
 [START ON 3/30/2019] morphine CR (MS CONTIN) 15 mg CR tablet Take 1 Tab by mouth every twelve (12) hours. Max Daily Amount: 30 mg.  
 oxyCODONE IR (ROXICODONE) 10 mg tab immediate release tablet 10MG PO QAM THEN Q8H PRN PAIN, max 3 tab/24hr  DULoxetine (CYMBALTA) 60 mg capsule Take 1 Cap by mouth daily.  finasteride (PROSCAR) 5 mg tablet Take 1 Tab by mouth daily.  cyclobenzaprine (FLEXERIL) 5 mg tablet Take 1 Tab by mouth three (3) times daily as needed for Muscle Spasm(s).  naloxone (NARCAN) 4 mg/actuation nasal spray Use 1 spray intranasally, then discard. Repeat with new spray every 2 min as needed for opioid overdose symptoms, alternating nostrils.  senna-docusate (PERICOLACE) 8.6-50 mg per tablet Take 2 Tabs by mouth two (2) times a day.  polyethylene glycol (MIRALAX) 17 gram/dose powder Take 17 g by mouth daily.  bisacodyl (DULCOLAX, BISACODYL,) 10 mg suppository Insert 10 mg into rectum daily as needed (for constipation). No current facility-administered medications for this visit. Medications Ordered Today Medications  morphine CR (MS CONTIN) 15 mg CR tablet Sig: Take 1 Tab by mouth nightly for 30 days. Max Daily Amount: 15 mg. Dispense:  30 Tab Refill:  0  
 morphine CR (MS CONTIN) 15 mg CR tablet Sig: Take 1 Tab by mouth every twelve (12) hours. Max Daily Amount: 30 mg. Dispense:  30 Tab Refill:  0  
 oxyCODONE IR (ROXICODONE) 10 mg tab immediate release tablet Sig: 10MG PO QAM THEN Q8H PRN PAIN, max 3 tab/24hr Dispense:  90 Tab Refill:  0 Total time: 25min Counseling / coordination time: 13min --  constipation management and regular bowel regimen, pain control and alternate methods of pain control 
> 50% counseling / coordination?: yes

## 2019-02-18 NOTE — PATIENT INSTRUCTIONS
Dear Carolee Lanes , It was a pleasure seeing you at home today with [facility] ([phone]) Your stated goal: To get out of bed again This is what we talked about: 1. 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 
- MS contin refills were left for 2/28/19 and 3/30/19 
- Oxy IR refill was left for #90 tabs which he may have his scheduled morning dose then 2 additional doses during the day if needed 2. Foot Pain - You have recently used more of the Oxy Ir (short acting pain medicaiton) due to left foot pain - This started last week after it \"got twisted\" while in your wheelchair - There is no swelling, bruising or abnormalities on todays exam 
- If the pain becomes worse, we may consider an xray - Ice and Tylenol are recommended as first steps for pain control 3. Constipation - It has been 4 days since your last BM 
- The senna is not being used regularly and frequently refused - Please resume taking the senna in the AM and PM 
- Start Miralax today - We discussed the importance of regular BM's and intervening by day 3 at the latest if you no BM 
- Increased use of your pain medication can worsen constipation 4. Health Maintenance - You are now up to date There are no preventive care reminders to display for this patient. 5. Advance Care Planning This is what you have shared with us about Advance Care Planning Primary Decision Maker (Health Care Agent): Anny Rosenbaum Relationship to patient: Cousin Phone number:  [phone] [x] Named in a scanned document  
[] Legal Next of Kin 
[] Guardian ACP documents you current have include: 
[x] Advance Directive or Living Will 
[x] Durable Do Not Resuscitate 
[] Physician Orders for Scope of Treatment (POST) [] Medical Power of  
[] Other Someone from the [facility] Team will see you again in 6 weeks If there are any concerns before that time, such as medication questions, worsening symptoms or a need to see a physician for an urgent or emergent situation; please call [phone]. A physician is also on call after our normal business hours of 8am to 5pm.  
 
In order to serve you better, please allow up to 48 hours for prescription refills to be processed. Certain medications may require more paperwork or a written prescription that you may need to  from the office. We appreciate you letting us know of any refill requests as soon as possible. Sincerely, The [facility] 
 
MD Mike Rondon, ELENITA Murphy, ELENITA House, GERARD Montoya RN Constipation: Care Instructions Your Care Instructions Constipation means that you have a hard time passing stools (bowel movements). People pass stools from 3 times a day to once every 3 days. What is normal for you may be different. Constipation may occur with pain in the rectum and cramping. The pain may get worse when you try to pass stools. Sometimes there are small amounts of bright red blood on toilet paper or the surface of stools. This is because of enlarged veins near the rectum (hemorrhoids). A few changes in your diet and lifestyle may help you avoid ongoing constipation. Your doctor may also prescribe medicine to help loosen your stool. Some medicines can cause constipation. These include pain medicines and antidepressants. Tell your doctor about all the medicines you take. Your doctor may want to make a medicine change to ease your symptoms. Follow-up care is a key part of your treatment and safety. Be sure to make and go to all appointments, and call your doctor if you are having problems. It's also a good idea to know your test results and keep a list of the medicines you take. How can you care for yourself at home? · Drink plenty of fluids, enough so that your urine is light yellow or clear like water. If you have kidney, heart, or liver disease and have to limit fluids, talk with your doctor before you increase the amount of fluids you drink. · Include high-fiber foods in your diet each day. These include fruits, vegetables, beans, and whole grains. · Get at least 30 minutes of exercise on most days of the week. Walking is a good choice. You also may want to do other activities, such as running, swimming, cycling, or playing tennis or team sports. · Take a fiber supplement, such as Citrucel or Metamucil, every day. Read and follow all instructions on the label. · Schedule time each day for a bowel movement. A daily routine may help. Take your time having your bowel movement. · Support your feet with a small step stool when you sit on the toilet. This helps flex your hips and places your pelvis in a squatting position. · Your doctor may recommend an over-the-counter laxative to relieve your constipation. Examples are Milk of Magnesia and MiraLax. Read and follow all instructions on the label. Do not use laxatives on a long-term basis. When should you call for help? Call your doctor now or seek immediate medical care if: 
  · You have new or worse belly pain.  
  · You have new or worse nausea or vomiting.  
  · You have blood in your stools.  
 Watch closely for changes in your health, and be sure to contact your doctor if: 
  · Your constipation is getting worse.  
  · You do not get better as expected. Where can you learn more? Go to http://félix-ho.info/. Enter 21  in the search box to learn more about \"Constipation: Care Instructions. \" Current as of: September 23, 2018 Content Version: 11.9 © 7105-4066 [employer].  Care instructions adapted under license by [employer] (which disclaims liability or warranty for this information). If you have questions about a medical condition or this instruction, always ask your healthcare professional. [place] [address] any warranty or liability for your use of this information.

## 2019-03-04 ENCOUNTER — TELEPHONE (OUTPATIENT)
Dept: PALLATIVE CARE | Age: 84
End: 2019-03-04

## 2019-03-04 RX ORDER — DULOXETIN HYDROCHLORIDE 60 MG/1
60 CAPSULE, DELAYED RELEASE ORAL DAILY
Qty: 90 CAP | Refills: 3 | Status: ON HOLD | OUTPATIENT
Start: 2019-03-04 | End: 2022-06-25

## 2019-03-06 ENCOUNTER — TELEPHONE (OUTPATIENT)
Dept: FAMILY MEDICINE CLINIC | Age: 84
End: 2019-03-06

## 2019-03-06 NOTE — TELEPHONE ENCOUNTER
Returned call to patient's cousin, Krista Lomas and informed her that I spoke with pharmacist and confirmed that Cymbalta is at the pharmacy and is ready for pickup.   Krista Lomas verbalized understanding

## 2019-03-06 NOTE — TELEPHONE ENCOUNTER
Caller left voicemail stating she talked with Rachel Gallagher yesterday about sending a medication to the [facility] on [facility] and she is at the pharmacy now and they have not received any refill request

## 2019-03-15 ENCOUNTER — TELEPHONE (OUTPATIENT)
Dept: PALLATIVE CARE | Age: 84
End: 2019-03-15

## 2019-03-15 NOTE — TELEPHONE ENCOUNTER
[employer] rep wanted to advise patient expressed his bed that was order is very uncomfortable and wanted to know if it was anything that could be done.  Requesting someone contact patient directly

## 2019-03-19 ENCOUNTER — TELEPHONE (OUTPATIENT)
Dept: FAMILY MEDICINE CLINIC | Age: 84
End: 2019-03-19

## 2019-03-19 RX ORDER — MENTHOL AND ZINC OXIDE .44; 20.625 G/100G; G/100G
1 OINTMENT TOPICAL AS NEEDED
Qty: 1 TUBE | Refills: 2 | Status: SHIPPED | OUTPATIENT
Start: 2019-03-19

## 2019-03-19 NOTE — TELEPHONE ENCOUNTER
Caller stated the pt is asking for skilled nursing. Caller stated pt has stage 1 on right buttocks, 2 small stage 1 on bilateral feet, pt also asking for physical therapy and occupational therapy.  Caller asking for orders

## 2019-03-19 NOTE — TELEPHONE ENCOUNTER
Spoke with Willam Olsen, caregiver, patient has had two different sessions with PT/OT, one in the fall and one last month. At this time there is no PT/OT desired or recommended by NP. Willam Olsen advised to  Calmoseptine at pharmacy for stage 1 ulcers, and call if they do not seem to improve or get worse. Viraj Moreno, NP will see patient on 3/27    Informed Kam Xiao, she was appreciative of update.

## 2019-03-27 ENCOUNTER — HOME VISIT (OUTPATIENT)
Dept: FAMILY MEDICINE CLINIC | Age: 84
End: 2019-03-27

## 2019-03-27 VITALS
DIASTOLIC BLOOD PRESSURE: 79 MMHG | TEMPERATURE: 98 F | HEART RATE: 78 BPM | OXYGEN SATURATION: 98 % | SYSTOLIC BLOOD PRESSURE: 153 MMHG | RESPIRATION RATE: 16 BRPM

## 2019-03-27 DIAGNOSIS — M45.0 ANKYLOSING SPONDYLITIS OF MULTIPLE SITES IN SPINE (HCC): ICD-10-CM

## 2019-03-27 DIAGNOSIS — L89.301 PRESSURE INJURY OF BUTTOCK, STAGE 1, UNSPECIFIED LATERALITY: ICD-10-CM

## 2019-03-27 DIAGNOSIS — S12.601D CLOSED NONDISPLACED FRACTURE OF SEVENTH CERVICAL VERTEBRA WITH ROUTINE HEALING, UNSPECIFIED FRACTURE MORPHOLOGY, SUBSEQUENT ENCOUNTER: Primary | ICD-10-CM

## 2019-03-27 DIAGNOSIS — M05.79 RHEUMATOID ARTHRITIS OF MULTIPLE SITES WITHOUT ORGAN OR SYSTEM INVOLVEMENT WITH POSITIVE RHEUMATOID FACTOR (HCC): ICD-10-CM

## 2019-03-27 DIAGNOSIS — Z73.0 BURNOUT OF CAREGIVER: ICD-10-CM

## 2019-03-27 DIAGNOSIS — K59.03 DRUG-INDUCED CONSTIPATION: ICD-10-CM

## 2019-03-27 DIAGNOSIS — R41.3 MEMORY IMPAIRMENT: ICD-10-CM

## 2019-03-27 RX ORDER — MORPHINE SULFATE 15 MG/1
15 TABLET, FILM COATED, EXTENDED RELEASE ORAL
Qty: 30 TAB | Refills: 0
Start: 2019-05-26 | End: 2019-06-25

## 2019-03-27 RX ORDER — OXYCODONE HYDROCHLORIDE 10 MG/1
10 TABLET ORAL
Qty: 90 TAB | Refills: 0
Start: 2019-05-16 | End: 2019-06-15

## 2019-03-27 RX ORDER — MORPHINE SULFATE 15 MG/1
15 TABLET, FILM COATED, EXTENDED RELEASE ORAL
Qty: 30 TAB | Refills: 0
Start: 2019-04-27 | End: 2019-05-27

## 2019-03-27 RX ORDER — OXYCODONE HYDROCHLORIDE 10 MG/1
10 TABLET ORAL
Qty: 54 TAB | Refills: 0
Start: 2019-03-27 | End: 2019-04-14

## 2019-03-27 RX ORDER — OXYCODONE HYDROCHLORIDE 10 MG/1
10 TABLET ORAL
Qty: 90 TAB | Refills: 0
Start: 2019-04-17 | End: 2019-05-17

## 2019-03-27 NOTE — PROGRESS NOTES
[facility]) & Supportive Services   
[phone] NOTES: [facility] is a PROVIDER (MD/NP) based interdisciplinary practice (RN, LCSW, Pharmacy, Dietician) for patient's who cannot access (or have a taxing effort) primary / speciality medical care in an office setting. [facility] is NOT [employer] but works with [employer]. when there is a skilled need. Our MD/NPs are integral in Care Transitions; PLEASE CALL [phone] if this patient arrives in the Emergency Department or Hospital.  
 
Date of Current Visit: 03/27/19 Patient/Family present for Current Visit: Leti Andrade, cousin present Goals of Care as stated by the patient/family is: To get out of bed again Total time: 90min Counseling / coordination time: 40 min - wound  prevention and care, chronic pain management, counseling to Leti Andrade w/ care giving challenge personally and w/ private agency, counseling and revisit of PT/OT eval, memory impairment/cognitive decline, constipation counseling/management 
> 50% counseling / coordination?: yes ASSESSMENT & PLAN Diagnoses and all orders for this visit: 1. Closed nondisplaced fracture of seventh cervical vertebra with routine healing, unspecified fracture morphology, subsequent encounter 2. Rheumatoid arthritis of multiple sites without organ or system involvement with positive rheumatoid factor (HCC) 
-     oxyCODONE IR (ROXICODONE) 10 mg tab immediate release tablet; Take 1 Tab by mouth every eight (8) hours as needed for Pain for up to 30 days. Max Daily Amount: 30 mg. 10MG PO QAM THEN Q8H PRN PAIN, max 3 tab/24hr  Indications: Pain 
-     morphine CR (MS CONTIN) 15 mg CR tablet; Take 1 Tab by mouth nightly for 30 days. Max Daily Amount: 15 mg. 
 
3. Ankylosing spondylitis of multiple sites in spine (HCC) 
-     oxyCODONE IR (ROXICODONE) 10 mg tab immediate release tablet;  Take 1 Tab by mouth every eight (8) hours as needed for Pain for up to 30 days. Max Daily Amount: 30 mg. 1 tab po QAM then Q8H prn 
-     morphine CR (MS CONTIN) 15 mg CR tablet; Take 1 Tab by mouth nightly for 30 days. Max Daily Amount: 15 mg. Indications: chronic pain 
-     oxyCODONE IR (ROXICODONE) 10 mg tab immediate release tablet; Take 1 Tab by mouth every eight (8) hours as needed for Pain for up to 18 days. Max Daily Amount: 3 mg. 1 tab po QAM then Q8H prn 4. Drug-induced constipation 5. Memory impairment 6. Pressure injury of buttock, stage 1, unspecified laterality 7. Burnout of caregiver 1. Chronic pain management for Cervical fx, RA, and Ankylosis spondylitis - well management on current regimen, MS contin 15mg at night and Oxy IR 10mg QAM then prn. Agreement on file. Previously attempted to collected Tox screen however it was cancelled by the lab. Will collect in the near future. Would like to get OOB more however there are challenges presented by different caregivers in the home. Discussed involvement/re-engaging home PT/OT to assist in reaching goals to get OOB. Discussion with Eliu Carrion as there are concerns for patients limited participation in previous therapy encounters. Will defer PT/OT referral at this time. 2. Last BM recorded yesterday x2 small and hard. Discussed bowel regimen in place with cousin and patient. Reinforcement w/ consistency given to pt/family. Unclear if private caregivers are utilizing Miralax in the home. Continue caregivers recording BM's. No changes made during visit. 3. Memory impairment stable as reported by pt. Continues to have delayed though processing, recall and communication deficit. Eliu Carrion is POA. Advance Directive in place. 4. New Stage 1 noted to buttock. Using Calmoseptine. Education provided to turn [facility] [place] and reposition. Continue skin care. Education provided on continued use of barrier creams.  Family to contact team should wound appear to decline. 5. Caregiver burnout present. Challenges exist in care giving with obtaining prescriptions and challenges provided by private agency Follow-up and Dispositions · Return in about 8 weeks (around 5/22/2019), or if symptoms worsen or fail to improve. I have left a SUMMARY / INSTRUCTIONS from today's visit with the patient/family in the home HEALTH MAINTENANCE There are no preventive care reminders to display for this patient. CC & SUBJECTIVE including ROS & FUNCTION Chief Complaint Patient presents with  Arthritis RA, chronic pain  Wound Check  
  new buttock wound Alen Giron is a 80 y. o. with chronic medical conditions as listed in the patient's updated Problem List (see below) Their Subjective Information provided as today's visit includes: Mr. Hernandez Care seen today for routine follow up and pain medication refill. Pt reports that his pain is controlled with current regimen using Oxy IR 10mg in the morning and MS Contin 15mg QHS. Utilizes additional Oxy IR prn in the afternoon. Dinorah Alexandralili shares that a new site on the buttock has been noted. Currently using Calmoseptine. Alen Giron seen today to follow up on chronic pain diagnosis. We discussed his spondylosis and rheumatoid arthritis that is affecting his neck, generalized. Significant changes since last visit: none. He is  able to do his normal daily activities. He reports the following adverse side effects: none. Least pain over the last week has been 0/10. Worst pain over the last week has been 6/10. Opioid Risk Tool Reviewed: YES Aberrant behaviors: None. Urine Drug Screen: due - order placed. Controlled substance agreement on file: YES.  reviewed:yes Pill count is consistent with his prescription: yes Concomitant use of a benzodiazepine: no 
 
Medications exceed 120 MME/day.   Will continue on current dose of medications as coarse has been stable and there are no signs of overuse, misuese, diversion, or concerning side effects Naloxone prescription is warranted. It has been provided or is already on file. 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 PPS: 30-40% Karnofsky:  
Timed Up and Go (TUG): Fall Risk Assessment, last 12 mths 2018 Able to walk? No  
 
 
ADVANCE CARE PLANNING The following information was updated I/ reviewed as accurate in Orders and the Advance Care Planning Navigator: 
DURABLE DO NOT RESUSCITATE on file Health Care Agent: Manuel Headings Other Relative - [phone] No flowsheet data found. VITAL SIGNS & PHYSICAL EXAM  
 
 
Wt Readings from Last 3 Encounters:  
No data found for Wt Temp Readings from Last 3 Encounters:  
19 98 °F (36.7 °C) (Temporal) 19 97.7 °F (36.5 °C) (Temporal) 19 98.1 °F (36.7 °C) (Temporal) BP Readings from Last 3 Encounters:  
19 153/79  
19 126/65  
19 145/81 Pulse Readings from Last 3 Encounters:  
19 78  
19 61  
19 87  
 
 
(Constitutional) Patient Physical Status:  male, adv. Age, visible debilitated, NAD Pacemaker: n/a ICD: n/a Feeding Tube:n/a 
Urine Catheter: n/a Other: 
 
Eyes: pupils equal, anicteric ENMT: no nasal discharge, moist mucous membranes Cardiovascular: regular rhythm, no M/R/G, distal pulses intact Respiratory: breathing not labored, symmetric, CTA bilat. Gastrointestinal: + bowel sounds, soft, non-tender, non-distended Musculoskeletal: , no tenderness to palpation Skin: warm, dry, stage I present to buttock Neurologic: A/Ox2, difficulty forming complete thought, repetitive,forgetful,  following commands, moving all extremities equally Psychiatric: normal affect, no hallucinations Other: 
Right MUAC 2/4/19= 25cm PROBLEM LIST Patient Active Problem List  
Diagnosis Code  Rheumatoid arthritis involving multiple sites with positive rheumatoid factor (HCC) M05.79  
 Aspiration pneumonia ([facility] [address] J69.0  Ankylosing spondylitis of multiple sites in spine ([facility] [address] M45.0  Memory impairment R41.3  Closed nondisplaced fracture of seventh cervical vertebra with routine healing S12.601D  Closed T7 fracture ([facility] [address] K41.602H  Bed confinement status Z74.01  
 Drug-induced constipation K59.03  
  
 
 MEDICATIONS & PRESCRIPTIONS No Known Allergies Current Outpatient Medications Medication Sig  [START ON 4/17/2019] oxyCODONE IR (ROXICODONE) 10 mg tab immediate release tablet Take 1 Tab by mouth every eight (8) hours as needed for Pain for up to 30 days. Max Daily Amount: 30 mg. 10MG PO QAM THEN Q8H PRN PAIN, max 3 tab/24hr  Indications: Pain  [START ON 5/16/2019] oxyCODONE IR (ROXICODONE) 10 mg tab immediate release tablet Take 1 Tab by mouth every eight (8) hours as needed for Pain for up to 30 days. Max Daily Amount: 30 mg. 1 tab po QAM then Q8H prn  [START ON 4/27/2019] morphine CR (MS CONTIN) 15 mg CR tablet Take 1 Tab by mouth nightly for 30 days. Max Daily Amount: 15 mg.  
 [START ON 5/26/2019] morphine CR (MS CONTIN) 15 mg CR tablet Take 1 Tab by mouth nightly for 30 days. Max Daily Amount: 15 mg. Indications: chronic pain  oxyCODONE IR (ROXICODONE) 10 mg tab immediate release tablet Take 1 Tab by mouth every eight (8) hours as needed for Pain for up to 18 days.  Max Daily Amount: 3 mg. 1 tab po QAM then Q8H prn  
 menthol-zinc oxide (CALMOSEPTINE) 0.44-20.6 % oint Apply 1 g to affected area as needed (apply generous amount to affected areas) for up to 100 doses.  DULoxetine (CYMBALTA) 60 mg capsule Take 1 Cap by mouth daily.  [START ON 3/30/2019] morphine CR (MS CONTIN) 15 mg CR tablet Take 1 Tab by mouth every twelve (12) hours. Max Daily Amount: 30 mg.  
 naloxone (NARCAN) 4 mg/actuation nasal spray Use 1 spray intranasally, then discard. Repeat with new spray every 2 min as needed for opioid overdose symptoms, alternating nostrils.  finasteride (PROSCAR) 5 mg tablet Take 1 Tab by mouth daily.  senna-docusate (PERICOLACE) 8.6-50 mg per tablet Take 2 Tabs by mouth two (2) times a day.  cyclobenzaprine (FLEXERIL) 5 mg tablet Take 1 Tab by mouth three (3) times daily as needed for Muscle Spasm(s).  polyethylene glycol (MIRALAX) 17 gram/dose powder Take 17 g by mouth daily.  bisacodyl (DULCOLAX, BISACODYL,) 10 mg suppository Insert 10 mg into rectum daily as needed (for constipation). No current facility-administered medications for this visit. Medications Ordered Today Medications  oxyCODONE IR (ROXICODONE) 10 mg tab immediate release tablet Sig: Take 1 Tab by mouth every eight (8) hours as needed for Pain for up to 30 days. Max Daily Amount: 30 mg. 10MG PO QAM THEN Q8H PRN PAIN, max 3 tab/24hr  Indications: Pain Dispense:  90 Tab Refill:  0  
 oxyCODONE IR (ROXICODONE) 10 mg tab immediate release tablet Sig: Take 1 Tab by mouth every eight (8) hours as needed for Pain for up to 30 days. Max Daily Amount: 30 mg. 1 tab po QAM then Q8H prn Dispense:  90 Tab Refill:  0  
 morphine CR (MS CONTIN) 15 mg CR tablet Sig: Take 1 Tab by mouth nightly for 30 days. Max Daily Amount: 15 mg. Dispense:  30 Tab Refill:  0  
 morphine CR (MS CONTIN) 15 mg CR tablet Sig: Take 1 Tab by mouth nightly for 30 days. Max Daily Amount: 15 mg. Indications: chronic pain Dispense:  30 Tab   Refill:  0  
  oxyCODONE IR (ROXICODONE) 10 mg tab immediate release tablet Sig: Take 1 Tab by mouth every eight (8) hours as needed for Pain for up to 18 days. Max Daily Amount: 3 mg. 1 tab po QAM then Q8H prn Dispense:  54 Tab Refill:  0  
  
 
 TEST DATA REVIEWED:  
 
No results found for: HBA1C, HGBE8, NRU3ZLAF, NLU6BHIG, YBX6RTSH No results found for: Clarisse Mattes, MCA2, [address], MCAU, [address], MCALPOCT No results found for: TSH, TSH2, TSH3, TSHP, TSHEXT, TSHEXT No results found for: Dana Lopez, Jessa Beal, Chantel Joyner, VD3RIA Lab Results Component Value Date/Time WBC 8.4 12/13/2018 12:14 PM  
 HGB 13.0 12/13/2018 12:14 PM  
 PLATELET 925 91/44/0026 12:14 PM  
 
Lab Results Component Value Date/Time Sodium 136 12/13/2018 12:14 PM  
 Potassium 4.5 12/13/2018 12:14 PM  
 Chloride 100 12/13/2018 12:14 PM  
 CO2 23 12/13/2018 12:14 PM  
 BUN 4 (L) 12/13/2018 12:14 PM  
 Creatinine 0.43 (L) 12/13/2018 12:14 PM  
 Calcium 8.7 12/13/2018 12:14 PM  
  
Lab Results Component Value Date/Time AST (SGOT) 13 12/13/2018 12:14 PM  
 Alk.  phosphatase 70 12/13/2018 12:14 PM  
 Protein, total 6.1 12/13/2018 12:14 PM  
 Albumin 3.2 (L) 12/13/2018 12:14 PM  
 
No results found for: IRON, FE, TIBC, IBCT, PSAT, FERR

## 2019-03-28 NOTE — PATIENT INSTRUCTIONS
Learning About Preventing Pressure Injuries What are pressure injuries? A pressure injury to the skin is caused by constant pressure over a period of time. The constant pressure blocks the blood supply to the skin. This causes skin cells to die and creates a sore. Pressure injuries are also called bedsores. Pressure injuries usually occur over bony areas, such as the hips, lower back, elbows, heels, and shoulders. Pressure injuries can also occur in places where the skin folds over on itself, or where medical equipment presses on the skin, such as when oxygen tubes press on the ears or cheeks. Pressure injuries can range from red areas on the surface of the skin to severe tissue damage that goes deep into muscle and bone. Severe sores are hard to treat and slow to heal. When pressure injuries do not heal properly, problems such as bone, blood, and skin infections can develop. What causes pressure injuries? Things that cause pressure injuries include: · Pressure on the skin and tissues. For example, the sores may happen when a person lies in bed or sits in a wheelchair for a long time. This is the most common cause of pressure injuries. · Sliding down in a bed or chair, forcing the skin to fold over itself (shear force). · Being pulled across bed sheets or other surfaces (friction burns). As we get older, our skin gets more thin and dry and less elastic, so it is easier to damage. Poor nutrition and not getting enough fluids make these natural changes in the skin worse. Skin in this condition may easily develop a pressure injury. Skin can also be damaged by sweat, feces, or urine, making pressure injuries more likely and harder to heal. 
How can you help prevent pressure injuries? If you are not able to do these things yourself, ask a family member or friend for help. Change position often · In a bed, change position every 2 hours. · In a wheelchair or other type of chair, shift your weight every 15 minutes, and give yourself a full relief of weight every hour. ? For a weight shift, lean forward and to the left and right. Push up out of the chair with your arms. If you have a chair that tilts, use the tilt control to help relieve pressure. ? For a full relief of weight, stand up or move to another chair or bed if you are able to. Personal care · Check your skin every day, especially around bony areas. When a pressure injury is forming, skin temperature can be different than nearby skin. It might be warmer or cooler. The skin can feel either firmer or softer than the surrounding skin. · Keep your skin clean and free of sweat, wound drainage, urine, and feces. · Use skin lotions to keep your skin from drying out and cracking. Barrier lotions or creams have ingredients that can act as a shield to help protect the skin from moisture or irritation. · Try not to slide or slump across sheets in a chair or bed. And try not to sleep in a recliner chair. Lifestyle choices · Eat healthy foods with plenty of protein to help heal damaged skin and to help new skin grow. · Get plenty of fluids. · Stay at a healthy weight. Being either overweight or underweight can make pressure injuries more likely. · If you smoke, stop. Smoking dries the skin and reduces its blood supply. If you need help quitting, talk to your doctor about stop-smoking programs and medicines. These can increase your chances of quitting for good. Ask about using cushions or pads · Overlays are special pads you put on top of a mattress. They provide a softer surface that will fit your body's shape better than a regular mattress. · Cushions or devices can be used to reduce pressure on certain areas of the body. For example, you can use a \"medical heel pillow\" to help prevent pressure injuries on heels. You can also get cushions for seating surfaces, such as wheelchair seats. Talk with your doctor about cushions and pads. Some products, such as doughnut-type devices, may actually cause pressure injuries or make them worse. Where can you learn more? Go to http://félix-ho.info/. Enter 746 [id number] in the search box to learn more about \"Learning About Preventing Pressure Injuries. \" Current as of: September 26, 2018 Content Version: 11.9 © 4764-3785 [employer]. Care instructions adapted under license by [employer] (which disclaims liability or warranty for this information). If you have questions about a medical condition or this instruction, always ask your healthcare professional. [address] any warranty or liability for your use of this information.

## 2019-04-01 ENCOUNTER — DOCUMENTATION ONLY (OUTPATIENT)
Dept: FAMILY MEDICINE CLINIC | Age: 84
End: 2019-04-01

## 2019-04-01 NOTE — PROGRESS NOTES
Home Based Primary Care Social Work Note    Narrative:SW visited with pt in his residence. He was alone, his  had already left and he is alone for a short duration until his evening caregiver arrives, according to [facility]. Pt was resting comfortably in bed, asleep when SW arrived. He was easily awakened, pleasantly confused, in good spirits. SW provided pt with Emergency Action Plan booklet today which SW completed with all appropriate phone numbers. SW called Yordy Kan while at the home to inform that the booklet was being left on the desk in pt's bedroom (former dining room). She expressed appreciation for the information and will take a look at it when she comes to the home later today. SW sat with pt and provided support, companionship. He began to fall asleep, so SW left pt sleep in peace and quiet. Plan: SW will remain available for psychosocial support and resources as requested. Yordy Kan has SW'ers contact information and SW encouraged her to reach out if she has any questions or concerns prior to next SW visit.      Kanika Mota, MSW, LCSW    Home Based Primary Care  [phone]

## 2019-04-05 ENCOUNTER — DOCUMENTATION ONLY (OUTPATIENT)
Dept: FAMILY MEDICINE CLINIC | Age: 84
End: 2019-04-05
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
## 2019-04-15 ENCOUNTER — TELEPHONE (OUTPATIENT)
Dept: FAMILY MEDICINE CLINIC | Age: 84
End: 2019-04-15

## 2019-04-15 NOTE — TELEPHONE ENCOUNTER
[facility] will see patient on Friday for increased confusion and general lack of interest.

## 2019-04-15 NOTE — TELEPHONE ENCOUNTER
Caller asking to speak with Elda regarding some concerns with the pt. Caller also asking if Estelle Glover has any recommendations for Geriatric physicians for her dad.

## 2019-04-19 ENCOUNTER — HOME VISIT (OUTPATIENT)
Dept: FAMILY MEDICINE CLINIC | Age: 84
End: 2019-04-19

## 2019-04-19 VITALS
RESPIRATION RATE: 16 BRPM | HEART RATE: 63 BPM | OXYGEN SATURATION: 95 % | DIASTOLIC BLOOD PRESSURE: 81 MMHG | SYSTOLIC BLOOD PRESSURE: 157 MMHG | TEMPERATURE: 98.1 F

## 2019-04-19 DIAGNOSIS — B02.9 ZOSTER WITHOUT COMPLICATIONS: Primary | ICD-10-CM

## 2019-04-19 DIAGNOSIS — K59.03 DRUG-INDUCED CONSTIPATION: ICD-10-CM

## 2019-04-19 DIAGNOSIS — L89.151 DECUBITUS ULCER OF SACRAL REGION, STAGE 1: ICD-10-CM

## 2019-04-19 DIAGNOSIS — M05.79 RHEUMATOID ARTHRITIS INVOLVING MULTIPLE SITES WITH POSITIVE RHEUMATOID FACTOR (HCC): ICD-10-CM

## 2019-04-19 DIAGNOSIS — M45.0 ANKYLOSING SPONDYLITIS OF MULTIPLE SITES IN SPINE (HCC): ICD-10-CM

## 2019-04-19 RX ORDER — VALACYCLOVIR HYDROCHLORIDE 1 G/1
1000 TABLET, FILM COATED ORAL 3 TIMES DAILY
Qty: 30 TAB | Refills: 0 | Status: SHIPPED | OUTPATIENT
Start: 2019-04-19 | End: 2019-04-29

## 2019-04-19 NOTE — PROGRESS NOTES
Home Based Primary Care [facility]) & Supportive Services   
[phone] NOTES: Home Based Primary Care is a PROVIDER (MD/NP) based interdisciplinary practice (RN, LCSW, Pharmacy, Dietician) for patient's who cannot access (or have a taxing effort) primary / speciality medical care in an office setting. [facility] is NOT [employer] but works with [address] when there is a skilled need. Our MD/NPs are integral in Care Transitions; PLEASE CALL [phone] if this patient arrives in the Emergency Department or Hospital.  
 
Date of Current Visit: 04/20/19 Patient/Family present for Current Visit: rober Virgen present Goals of Care as stated by the patient/family is: To get out of bed again Total time: 60min Counseling / coordination time: 30 minutesdirect patient care with skin care, counseling on herpes zoster with treatment and hygiene, the formation of decubitus ulcer/bedsores, turning repositioning techniques with offloading pressure, caregiver support, chronic pain management review, constipation management 
> 50% counseling / coordination?: yes ASSESSMENT & PLAN Diagnoses and all orders for this visit: 1. Zoster without complications 2. Ankylosing spondylitis of multiple sites in spine ([facility] [address] 3. Rheumatoid arthritis involving multiple sites with positive rheumatoid factor ([facility] 4. Decubitus ulcer of sacral region, stage 1 
 
5. Drug-induced constipation Other orders 
-     valACYclovir (VALTREX) 1 gram tablet; Take 1 Tab by mouth three (3) times daily for 10 days. 1.  Zoster discovered on exam.  There are new vesicles still present as well as mixed scabbing. Will initiate valacyclovir therapy. Education provided to patient, cousin Chelita Sheriff, and private caregiver present on shingles/zoster. At this time there appears to be no significant neuropathic pain present. We will continue to monitor ongoing symptoms and progression. 2.& 3. On chronic pain management and stable at this time on current therapy. Refills not indicated today. Current controlled substance agreement on file. 4.  Ongoing education provided to both patient, cousin and private caregiver regarding stage I to sacral / coccyx area. Due to cognitive impairment patient lacks insight on significance of wound development. Provided teaching to both patient, cousin and care provider to include use of barrier creams and repositioning. Patient resistant to repositioning and attributes buttock discomfort to hospital bed mattress. Patient will need ongoing reinforcement as well as family support to reduce progression and skin breakdown. 5.  On Laurie-Colace twice a day. Continues to have challenges with bowel movements and constipation. Will add MiraLAX dosing every Monday, Wednesday and Friday. Follow-up and Dispositions · Return in about 3 weeks (around 5/8/2019), or if symptoms worsen or fail to improve, for Regular Follow-up. I have left a SUMMARY / INSTRUCTIONS from today's visit with the patient/family in the home HEALTH MAINTENANCE There are no preventive care reminders to display for this patient. CC & SUBJECTIVE including ROS & FUNCTION Chief Complaint Patient presents with  Rash  Wound Check  
  stage 1 sacrum  Constipation Rodney Guillaume is a 80 y. o. with chronic medical conditions as listed in the patient's updated Problem List (see below) Their Subjective Information provided as today's visit includes: Mr. Katie Vargas is seen today at request of cousin, Kortney Sanders, for evaluation of periods of ongoing and increased confusion. Today, Terry Snyder reports that Mr. Katie Vargas is actually doing well and cognitively improved. Caregiver reports today of rash that does not appear to be improving with use of calmoseptine. Requesting evaluation of site as well as sacral wound that was discovered at previous encounter. Mr. Elsi Ibrahim shares that his pain is well controlled on current regimen that includes oxycodone IR 10 mg in the morning and extended release morphine at night. Ongoing challenges with constipation. Stools continue to be hard and frequency is approximately every 3 to 5 days of passing a bowel movement despite Laurie-Colace dosing. MiraLAX use variable depending on caregiver present. ADVANCE CARE PLANNING The following information was updated I/ reviewed as accurate in Orders and the Advance Care Planning Navigator: 
DURABLE DO NOT RESUSCITATE on file Health Care Agent: Madina Davis Other Relative - [phone] No flowsheet data found. VITAL SIGNS & PHYSICAL EXAM  
 
 
Wt Readings from Last 3 Encounters:  
No data found for Wt Temp Readings from Last 3 Encounters:  
04/19/19 98.1 °F (36.7 °C) (Temporal) 03/27/19 98 °F (36.7 °C) (Temporal) 02/18/19 97.7 °F (36.5 °C) (Temporal) BP Readings from Last 3 Encounters:  
04/19/19 157/81  
03/27/19 153/79  
02/18/19 126/65 Pulse Readings from Last 3 Encounters:  
04/19/19 63  
03/27/19 78  
02/18/19 61  
 
 
(Constitutional) Patient Physical Status:  male, adv. Age, visible debilitated, NAD Pacemaker: n/a ICD: n/a Feeding Tube:n/a 
Urine Catheter: n/a Other: 
 
Eyes: pupils equal, anicteric ENMT: no nasal discharge, moist mucous membranes Cardiovascular: regular rhythm, no M/R/G, distal pulses intact Respiratory: breathing not labored, symmetric, CTA bilat. Gastrointestinal: + bowel sounds, soft, non-tender, non-distended Musculoskeletal: , no tenderness to palpation Skin: warm, dry, stage I present to buttock, vesicular lesions present to L4/5,S1 distribution Neurologic: A/Ox2, difficulty forming complete thought, repetitive,forgetful,  following commands, moving all extremities equally Psychiatric: normal affect, no hallucinations Other: 
Right MUAC 2/4/19= 25cm Right MUAC 4/19/19 = 25.5cm 
 
 
<<--- HEAD      -------> right hip/buttock PROBLEM LIST Patient Active Problem List  
Diagnosis Code  Rheumatoid arthritis involving multiple sites with positive rheumatoid factor (HCC) M05.79  
 Aspiration pneumonia ([facility] [address] J69.0  Ankylosing spondylitis of multiple sites in spine ([facility] M45.0  Memory impairment R41.3  Closed nondisplaced fracture of seventh cervical vertebra with routine healing S12.601D  Closed T7 fracture ([facility] [address] E47.755N  Bed confinement status Z74.01  
 Drug-induced constipation K59.03  
  
 
 MEDICATIONS & PRESCRIPTIONS No Known Allergies Current Outpatient Medications Medication Sig  
 valACYclovir (VALTREX) 1 gram tablet Take 1 Tab by mouth three (3) times daily for 10 days.  oxyCODONE IR (ROXICODONE) 10 mg tab immediate release tablet Take 1 Tab by mouth every eight (8) hours as needed for Pain for up to 30 days. Max Daily Amount: 30 mg. 10MG PO QAM THEN Q8H PRN PAIN, max 3 tab/24hr  Indications: Pain  menthol-zinc oxide (CALMOSEPTINE) 0.44-20.6 % oint Apply 1 g to affected area as needed (apply generous amount to affected areas) for up to 100 doses.  DULoxetine (CYMBALTA) 60 mg capsule Take 1 Cap by mouth daily.  morphine CR (MS CONTIN) 15 mg CR tablet Take 1 Tab by mouth every twelve (12) hours. Max Daily Amount: 30 mg.  
 finasteride (PROSCAR) 5 mg tablet Take 1 Tab by mouth daily.  senna-docusate (PERICOLACE) 8.6-50 mg per tablet Take 2 Tabs by mouth two (2) times a day.  polyethylene glycol (MIRALAX) 17 gram/dose powder Take 17 g by mouth daily.  [START ON 5/16/2019] oxyCODONE IR (ROXICODONE) 10 mg tab immediate release tablet Take 1 Tab by mouth every eight (8) hours as needed for Pain for up to 30 days. Max Daily Amount: 30 mg. 1 tab po QAM then Q8H prn  [START ON 4/27/2019] morphine CR (MS CONTIN) 15 mg CR tablet Take 1 Tab by mouth nightly for 30 days. Max Daily Amount: 15 mg.  [START ON 5/26/2019] morphine CR (MS CONTIN) 15 mg CR tablet Take 1 Tab by mouth nightly for 30 days. Max Daily Amount: 15 mg. Indications: chronic pain  
 naloxone (NARCAN) 4 mg/actuation nasal spray Use 1 spray intranasally, then discard. Repeat with new spray every 2 min as needed for opioid overdose symptoms, alternating nostrils.  cyclobenzaprine (FLEXERIL) 5 mg tablet Take 1 Tab by mouth three (3) times daily as needed for Muscle Spasm(s).  bisacodyl (DULCOLAX, BISACODYL,) 10 mg suppository Insert 10 mg into rectum daily as needed (for constipation). No current facility-administered medications for this visit. Medications Ordered Today Medications  valACYclovir (VALTREX) 1 gram tablet Sig: Take 1 Tab by mouth three (3) times daily for 10 days. Dispense:  30 Tab Refill:  0  
  
 
 TEST DATA REVIEWED:  
 
No results found for: HBA1C, HGBE8, QLF8PLRJ, CJK7KTKI, ZAS1SXIJ No results found for: Tali Kyle, MCA2, [address], MCAU, [address], MCALPOCT No results found for: TSH, TSH2, TSH3, TSHP, TSHEXT, TSHEXT No results found for: Esvin Aburto, Dhara Frank, Belem Borrero, ECTOR3RIA Lab Results Component Value Date/Time WBC 8.4 12/13/2018 12:14 PM  
 HGB 13.0 12/13/2018 12:14 PM  
 PLATELET 453 11/41/1370 12:14 PM  
 
Lab Results Component Value Date/Time Sodium 136 12/13/2018 12:14 PM  
 Potassium 4.5 12/13/2018 12:14 PM  
 Chloride 100 12/13/2018 12:14 PM  
 CO2 23 12/13/2018 12:14 PM  
 BUN 4 (L) 12/13/2018 12:14 PM  
 Creatinine 0.43 (L) 12/13/2018 12:14 PM  
 Calcium 8.7 12/13/2018 12:14 PM  
  
Lab Results Component Value Date/Time AST (SGOT) 13 12/13/2018 12:14 PM  
 Alk.  phosphatase 70 12/13/2018 12:14 PM  
 Protein, total 6.1 12/13/2018 12:14 PM  
 Albumin 3.2 (L) 12/13/2018 12:14 PM  
 
No results found for: IRON, FE, TIBC, IBCT, PSAT, FERR

## 2019-04-20 NOTE — PATIENT INSTRUCTIONS

## 2019-05-01 ENCOUNTER — DOCUMENTATION ONLY (OUTPATIENT)
Dept: FAMILY MEDICINE CLINIC | Age: 84
End: 2019-05-01

## 2019-05-06 ENCOUNTER — TELEPHONE (OUTPATIENT)
Dept: FAMILY MEDICINE CLINIC | Age: 84
End: 2019-05-06

## 2019-05-06 ENCOUNTER — TELEPHONE (OUTPATIENT)
Dept: PALLATIVE CARE | Age: 84
End: 2019-05-06

## 2019-05-06 NOTE — TELEPHONE ENCOUNTER
Patient is leaving for [facility] at [facility] that nursing should assess him upon arrival.     Nurse will check in with grazyna moreno tomorrow if they do not call today.

## 2019-05-06 NOTE — TELEPHONE ENCOUNTER
Kasey French is calling to speak to nurse regarding just receiving patient. Anni Price states that they do not have any scripts for patient for his medications. Please call to discuss. And need copy of DDNR. Please call to discuss.

## 2019-05-06 NOTE — TELEPHONE ENCOUNTER
Louvenia Seip stated that she got a call yesterday stating that rash has gotten worse. Louvenia Seip is at facility with patient now, he is eating lunch. Louvenia Seip will call back after [facility] nurse can assess back.

## 2019-05-06 NOTE — TELEPHONE ENCOUNTER
Caller stated the pt is moving to [facility] today but she is still concerned about the rash on his back side. Sven Fleming treated it as shingles but it has not gotten any better.  She will call back with the new address and room number but she wants to talk with a nurse

## 2019-05-07 NOTE — TELEPHONE ENCOUNTER
Spoke with Merlin Lopez at [facility] with [employer] Tomas NP on phone. Filled 3 days of MS contin and oxycodone. Andres Mak notified.     DNR faxed to [facility]

## 2019-05-08 ENCOUNTER — TELEPHONE (OUTPATIENT)
Dept: FAMILY MEDICINE CLINIC | Age: 84
End: 2019-05-08

## 2019-05-08 NOTE — TELEPHONE ENCOUNTER
Outgoing call placed to patient's Kain Jernigan. Advised Ms. Nasim Simmons that her cousin is in [facility] rehab care and home-based primary care is unable to provide primary care needs to this patient at that area of the facility. Primary care needs will be addressed by medical director and his team.  Should patient transition to assisted living we will resume as the primary care provider.

## 2020-12-17 ENCOUNTER — TELEPHONE (OUTPATIENT)
Dept: FAMILY MEDICINE CLINIC | Age: 85
End: 2020-12-17

## 2020-12-17 NOTE — TELEPHONE ENCOUNTER
Spoke with cousin Annell Eisenmenger. Patient is doing very well at [facility]. Appreciative of call.

## 2022-02-12 ENCOUNTER — HOSPITAL ENCOUNTER (INPATIENT)
Age: 87
LOS: 5 days | Discharge: NURSING, REHAB OR LONG-TERM CARE | DRG: 177 | End: 2022-02-17
Attending: STUDENT IN AN ORGANIZED HEALTH CARE EDUCATION/TRAINING PROGRAM | Admitting: HOSPITALIST
Payer: MEDICARE

## 2022-02-12 ENCOUNTER — APPOINTMENT (OUTPATIENT)
Dept: CT IMAGING | Age: 87
DRG: 177 | End: 2022-02-12
Attending: HOSPITALIST
Payer: MEDICARE

## 2022-02-12 ENCOUNTER — APPOINTMENT (OUTPATIENT)
Dept: GENERAL RADIOLOGY | Age: 87
DRG: 177 | End: 2022-02-12
Attending: STUDENT IN AN ORGANIZED HEALTH CARE EDUCATION/TRAINING PROGRAM
Payer: MEDICARE

## 2022-02-12 DIAGNOSIS — J18.9 PNEUMONIA OF RIGHT LUNG DUE TO INFECTIOUS ORGANISM, UNSPECIFIED PART OF LUNG: Primary | ICD-10-CM

## 2022-02-12 PROBLEM — J96.01 ACUTE HYPOXEMIC RESPIRATORY FAILURE (HCC): Status: ACTIVE | Noted: 2022-02-12

## 2022-02-12 LAB
ALBUMIN SERPL-MCNC: 3.3 G/DL (ref 3.5–5)
ALBUMIN/GLOB SERPL: 0.9 {RATIO} (ref 1.1–2.2)
ALP SERPL-CCNC: 83 U/L (ref 45–117)
ALT SERPL-CCNC: 55 U/L (ref 12–78)
ANION GAP SERPL CALC-SCNC: 6 MMOL/L (ref 5–15)
APPEARANCE UR: CLEAR
AST SERPL-CCNC: 61 U/L (ref 15–37)
ATRIAL RATE: 73 BPM
BACTERIA URNS QL MICRO: NEGATIVE /HPF
BASOPHILS # BLD: 0 K/UL (ref 0–0.1)
BASOPHILS NFR BLD: 0 % (ref 0–1)
BILIRUB SERPL-MCNC: 1 MG/DL (ref 0.2–1)
BILIRUB UR QL: NEGATIVE
BUN SERPL-MCNC: 12 MG/DL (ref 6–20)
BUN/CREAT SERPL: 20 (ref 12–20)
CALCIUM SERPL-MCNC: 9.3 MG/DL (ref 8.5–10.1)
CALCULATED P AXIS, ECG09: 74 DEGREES
CALCULATED R AXIS, ECG10: -51 DEGREES
CALCULATED T AXIS, ECG11: 103 DEGREES
CHLORIDE SERPL-SCNC: 107 MMOL/L (ref 97–108)
CO2 SERPL-SCNC: 25 MMOL/L (ref 21–32)
COLOR UR: ABNORMAL
COMMENT, HOLDF: NORMAL
COVID-19 RAPID TEST, COVR: NOT DETECTED
CREAT SERPL-MCNC: 0.6 MG/DL (ref 0.7–1.3)
DIAGNOSIS, 93000: NORMAL
DIFFERENTIAL METHOD BLD: ABNORMAL
EOSINOPHIL # BLD: 0 K/UL (ref 0–0.4)
EOSINOPHIL NFR BLD: 0 % (ref 0–7)
EPITH CASTS URNS QL MICRO: ABNORMAL /LPF
ERYTHROCYTE [DISTWIDTH] IN BLOOD BY AUTOMATED COUNT: 13.2 % (ref 11.5–14.5)
GLOBULIN SER CALC-MCNC: 3.7 G/DL (ref 2–4)
GLUCOSE SERPL-MCNC: 155 MG/DL (ref 65–100)
GLUCOSE UR STRIP.AUTO-MCNC: NEGATIVE MG/DL
HCT VFR BLD AUTO: 48.8 % (ref 36.6–50.3)
HGB BLD-MCNC: 16.5 G/DL (ref 12.1–17)
HGB UR QL STRIP: ABNORMAL
IMM GRANULOCYTES # BLD AUTO: 0 K/UL
IMM GRANULOCYTES NFR BLD AUTO: 0 %
KETONES UR QL STRIP.AUTO: ABNORMAL MG/DL
LEUKOCYTE ESTERASE UR QL STRIP.AUTO: ABNORMAL
LIPASE SERPL-CCNC: 34 U/L (ref 73–393)
LYMPHOCYTES # BLD: 0.4 K/UL (ref 0.8–3.5)
LYMPHOCYTES NFR BLD: 9 % (ref 12–49)
MCH RBC QN AUTO: 32 PG (ref 26–34)
MCHC RBC AUTO-ENTMCNC: 33.8 G/DL (ref 30–36.5)
MCV RBC AUTO: 94.6 FL (ref 80–99)
MONOCYTES # BLD: 0.3 K/UL (ref 0–1)
MONOCYTES NFR BLD: 8 % (ref 5–13)
NEUTS BAND NFR BLD MANUAL: 1 % (ref 0–6)
NEUTS SEG # BLD: 3.5 K/UL (ref 1.8–8)
NEUTS SEG NFR BLD: 82 % (ref 32–75)
NITRITE UR QL STRIP.AUTO: NEGATIVE
NRBC # BLD: 0 K/UL (ref 0–0.01)
NRBC BLD-RTO: 0 PER 100 WBC
P-R INTERVAL, ECG05: 298 MS
PH UR STRIP: 5.5 [PH] (ref 5–8)
PLATELET # BLD AUTO: 335 K/UL (ref 150–400)
PMV BLD AUTO: 9.6 FL (ref 8.9–12.9)
POTASSIUM SERPL-SCNC: 3.7 MMOL/L (ref 3.5–5.1)
PROCALCITONIN SERPL-MCNC: 0.66 NG/ML
PROT SERPL-MCNC: 7 G/DL (ref 6.4–8.2)
PROT UR STRIP-MCNC: 30 MG/DL
Q-T INTERVAL, ECG07: 432 MS
QRS DURATION, ECG06: 160 MS
QTC CALCULATION (BEZET), ECG08: 475 MS
RBC # BLD AUTO: 5.16 M/UL (ref 4.1–5.7)
RBC #/AREA URNS HPF: ABNORMAL /HPF (ref 0–5)
RBC MORPH BLD: ABNORMAL
SAMPLES BEING HELD,HOLD: NORMAL
SARS-COV-2, XPLCVT: NOT DETECTED
SODIUM SERPL-SCNC: 138 MMOL/L (ref 136–145)
SOURCE, COVRS: NORMAL
SOURCE, COVRS: NORMAL
SP GR UR REFRACTOMETRY: 1.02 (ref 1–1.03)
TROPONIN-HIGH SENSITIVITY: 16 NG/L (ref 0–76)
UR CULT HOLD, URHOLD: NORMAL
UROBILINOGEN UR QL STRIP.AUTO: 0.2 EU/DL (ref 0.2–1)
VENTRICULAR RATE, ECG03: 73 BPM
WBC # BLD AUTO: 4.2 K/UL (ref 4.1–11.1)
WBC URNS QL MICRO: ABNORMAL /HPF (ref 0–4)
YEAST BUDDING URNS QL: PRESENT
YEAST URNS QL MICRO: PRESENT
YEAST URNS QL MICRO: PRESENT

## 2022-02-12 PROCEDURE — 83690 ASSAY OF LIPASE: CPT

## 2022-02-12 PROCEDURE — 74011000250 HC RX REV CODE- 250: Performed by: HOSPITALIST

## 2022-02-12 PROCEDURE — 87635 SARS-COV-2 COVID-19 AMP PRB: CPT

## 2022-02-12 PROCEDURE — 74176 CT ABD & PELVIS W/O CONTRAST: CPT

## 2022-02-12 PROCEDURE — 93005 ELECTROCARDIOGRAM TRACING: CPT

## 2022-02-12 PROCEDURE — 85025 COMPLETE CBC W/AUTO DIFF WBC: CPT

## 2022-02-12 PROCEDURE — 74011250636 HC RX REV CODE- 250/636: Performed by: HOSPITALIST

## 2022-02-12 PROCEDURE — 74011000250 HC RX REV CODE- 250: Performed by: STUDENT IN AN ORGANIZED HEALTH CARE EDUCATION/TRAINING PROGRAM

## 2022-02-12 PROCEDURE — 51798 US URINE CAPACITY MEASURE: CPT

## 2022-02-12 PROCEDURE — 74011250636 HC RX REV CODE- 250/636: Performed by: STUDENT IN AN ORGANIZED HEALTH CARE EDUCATION/TRAINING PROGRAM

## 2022-02-12 PROCEDURE — 87077 CULTURE AEROBIC IDENTIFY: CPT

## 2022-02-12 PROCEDURE — 87186 SC STD MICRODIL/AGAR DIL: CPT

## 2022-02-12 PROCEDURE — 74011000258 HC RX REV CODE- 258: Performed by: STUDENT IN AN ORGANIZED HEALTH CARE EDUCATION/TRAINING PROGRAM

## 2022-02-12 PROCEDURE — 84145 PROCALCITONIN (PCT): CPT

## 2022-02-12 PROCEDURE — 94761 N-INVAS EAR/PLS OXIMETRY MLT: CPT

## 2022-02-12 PROCEDURE — 84484 ASSAY OF TROPONIN QUANT: CPT

## 2022-02-12 PROCEDURE — 96375 TX/PRO/DX INJ NEW DRUG ADDON: CPT

## 2022-02-12 PROCEDURE — 80053 COMPREHEN METABOLIC PANEL: CPT

## 2022-02-12 PROCEDURE — 51702 INSERT TEMP BLADDER CATH: CPT

## 2022-02-12 PROCEDURE — 36415 COLL VENOUS BLD VENIPUNCTURE: CPT

## 2022-02-12 PROCEDURE — 71045 X-RAY EXAM CHEST 1 VIEW: CPT

## 2022-02-12 PROCEDURE — 81001 URINALYSIS AUTO W/SCOPE: CPT

## 2022-02-12 PROCEDURE — U0005 INFEC AGEN DETEC AMPLI PROBE: HCPCS

## 2022-02-12 PROCEDURE — 74011000258 HC RX REV CODE- 258: Performed by: HOSPITALIST

## 2022-02-12 PROCEDURE — 99285 EMERGENCY DEPT VISIT HI MDM: CPT

## 2022-02-12 PROCEDURE — 74011250637 HC RX REV CODE- 250/637: Performed by: HOSPITALIST

## 2022-02-12 PROCEDURE — 65660000000 HC RM CCU STEPDOWN

## 2022-02-12 PROCEDURE — 87040 BLOOD CULTURE FOR BACTERIA: CPT

## 2022-02-12 PROCEDURE — 87070 CULTURE OTHR SPECIMN AEROBIC: CPT

## 2022-02-12 PROCEDURE — 96374 THER/PROPH/DIAG INJ IV PUSH: CPT

## 2022-02-12 RX ORDER — MORPHINE SULFATE 15 MG/1
15 TABLET, FILM COATED, EXTENDED RELEASE ORAL EVERY 12 HOURS
Status: DISCONTINUED | OUTPATIENT
Start: 2022-02-12 | End: 2022-02-17 | Stop reason: HOSPADM

## 2022-02-12 RX ORDER — FINASTERIDE 5 MG/1
5 TABLET, FILM COATED ORAL DAILY
Status: DISCONTINUED | OUTPATIENT
Start: 2022-02-12 | End: 2022-02-17 | Stop reason: HOSPADM

## 2022-02-12 RX ORDER — HYDROCODONE BITARTRATE AND ACETAMINOPHEN 5; 325 MG/1; MG/1
1 TABLET ORAL
Status: DISCONTINUED | OUTPATIENT
Start: 2022-02-12 | End: 2022-02-17 | Stop reason: HOSPADM

## 2022-02-12 RX ORDER — ENOXAPARIN SODIUM 100 MG/ML
40 INJECTION SUBCUTANEOUS EVERY 24 HOURS
Status: DISCONTINUED | OUTPATIENT
Start: 2022-02-12 | End: 2022-02-17 | Stop reason: HOSPADM

## 2022-02-12 RX ORDER — FACIAL-BODY WIPES
10 EACH TOPICAL
Status: DISCONTINUED | OUTPATIENT
Start: 2022-02-12 | End: 2022-02-17 | Stop reason: HOSPADM

## 2022-02-12 RX ORDER — NALOXONE HYDROCHLORIDE 0.4 MG/ML
0.4 INJECTION, SOLUTION INTRAMUSCULAR; INTRAVENOUS; SUBCUTANEOUS AS NEEDED
Status: DISCONTINUED | OUTPATIENT
Start: 2022-02-12 | End: 2022-02-17 | Stop reason: HOSPADM

## 2022-02-12 RX ORDER — ONDANSETRON 2 MG/ML
4 INJECTION INTRAMUSCULAR; INTRAVENOUS
Status: COMPLETED | OUTPATIENT
Start: 2022-02-12 | End: 2022-02-12

## 2022-02-12 RX ORDER — DULOXETIN HYDROCHLORIDE 60 MG/1
60 CAPSULE, DELAYED RELEASE ORAL DAILY
Status: DISCONTINUED | OUTPATIENT
Start: 2022-02-12 | End: 2022-02-17 | Stop reason: HOSPADM

## 2022-02-12 RX ORDER — SODIUM CHLORIDE 0.9 % (FLUSH) 0.9 %
5-40 SYRINGE (ML) INJECTION AS NEEDED
Status: DISCONTINUED | OUTPATIENT
Start: 2022-02-12 | End: 2022-02-17 | Stop reason: HOSPADM

## 2022-02-12 RX ORDER — SODIUM CHLORIDE 0.9 % (FLUSH) 0.9 %
5-40 SYRINGE (ML) INJECTION EVERY 8 HOURS
Status: DISCONTINUED | OUTPATIENT
Start: 2022-02-12 | End: 2022-02-17 | Stop reason: HOSPADM

## 2022-02-12 RX ORDER — AMOXICILLIN 250 MG
2 CAPSULE ORAL 2 TIMES DAILY
Status: DISCONTINUED | OUTPATIENT
Start: 2022-02-12 | End: 2022-02-17 | Stop reason: HOSPADM

## 2022-02-12 RX ORDER — IPRATROPIUM BROMIDE AND ALBUTEROL SULFATE 2.5; .5 MG/3ML; MG/3ML
3 SOLUTION RESPIRATORY (INHALATION)
Status: DISCONTINUED | OUTPATIENT
Start: 2022-02-12 | End: 2022-02-17 | Stop reason: HOSPADM

## 2022-02-12 RX ADMIN — PIPERACILLIN AND TAZOBACTAM 3.38 G: 3; .375 INJECTION, POWDER, LYOPHILIZED, FOR SOLUTION INTRAVENOUS at 20:27

## 2022-02-12 RX ADMIN — ONDANSETRON HYDROCHLORIDE 4 MG: 2 SOLUTION INTRAMUSCULAR; INTRAVENOUS at 10:17

## 2022-02-12 RX ADMIN — SODIUM CHLORIDE, PRESERVATIVE FREE 10 ML: 5 INJECTION INTRAVENOUS at 16:01

## 2022-02-12 RX ADMIN — PIPERACILLIN AND TAZOBACTAM 3.38 G: 3; .375 INJECTION, POWDER, LYOPHILIZED, FOR SOLUTION INTRAVENOUS at 11:56

## 2022-02-12 RX ADMIN — SODIUM CHLORIDE, PRESERVATIVE FREE 10 ML: 5 INJECTION INTRAVENOUS at 22:35

## 2022-02-12 RX ADMIN — MORPHINE SULFATE 15 MG: 15 TABLET, FILM COATED, EXTENDED RELEASE ORAL at 16:00

## 2022-02-12 RX ADMIN — WATER 1 G: 1 INJECTION INTRAMUSCULAR; INTRAVENOUS; SUBCUTANEOUS at 10:17

## 2022-02-12 RX ADMIN — ENOXAPARIN SODIUM 40 MG: 100 INJECTION SUBCUTANEOUS at 11:57

## 2022-02-12 RX ADMIN — ONDANSETRON HYDROCHLORIDE 4 MG: 2 SOLUTION INTRAMUSCULAR; INTRAVENOUS at 08:00

## 2022-02-12 RX ADMIN — AZITHROMYCIN MONOHYDRATE 500 MG: 500 INJECTION, POWDER, LYOPHILIZED, FOR SOLUTION INTRAVENOUS at 10:16

## 2022-02-12 NOTE — CONSULTS
Urology Consult    Patient: Ramírez Porras MRN: [id number]  SSN: [id number]    YOB: 1935  Age: 80 y.o. Sex: male          Date of Consultation:  February 12, 2022  Requesting Physician: Stephane Jenkins MD  Reason for Consultation: Urinary retention           Assessment/Plan:  Urinary retention. Placed 16F caruso without significant difficulty. Please keep caruso in place for 48hrs minimum. Start tamsulosin and continue Finasteride. History of Present Illness:  Patient is a 80 y.o. male admitted 2/12/2022 to the hospital for Pneumonia [J18.9]  Acute hypoxemic respiratory failure ([facility] [address] [J96.01]. 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 Patient is alert and oriented. He says he has been coughing a lot since yesterday. He was reported to have nausea and vomiting and when asked him about swallowing he said he admitted he has some difficulty with swallowing. He denied fever or chills. He does not think he is vaccinated against COVID. Past Medical History:  No Known Allergies   Prior to Admission medications    Medication Sig Start Date End Date Taking? Authorizing Provider   menthol-zinc oxide (CALMOSEPTINE) 0.44-20.6 % oint Apply 1 g to affected area as needed (apply generous amount to affected areas) for up to 100 doses. 3/19/19   Olivia Ardon NP   DULoxetine (CYMBALTA) 60 mg capsule Take 1 Cap by mouth daily. 3/4/19   Olivia Ardon NP   morphine CR (MS CONTIN) 15 mg CR tablet Take 1 Tab by mouth every twelve (12) hours. Max Daily Amount: 30 mg. 3/30/19   Olivia Ardon NP   naloxone [facility]) 4 mg/actuation nasal spray Use 1 spray intranasally, then discard.  Repeat with new spray every 2 min as needed for opioid overdose symptoms, alternating nostrils. 19   Jenny Arambula NP   finasteride (PROSCAR) 5 mg tablet Take 1 Tab by mouth daily. 18   Jenny Tyesha NP   senna-docusate (PERICOLACE) 8.6-50 mg per tablet Take 2 Tabs by mouth two (2) times a day. 18   Jenny Tyesha NP   cyclobenzaprine (FLEXERIL) 5 mg tablet Take 1 Tab by mouth three (3) times daily as needed for Muscle Spasm(s). 18   Akeley Tyesha NP   polyethylene glycol (MIRALAX) 17 gram/dose powder Take 17 g by mouth daily. Provider, Historical   bisacodyl (DULCOLAX, BISACODYL,) 10 mg suppository Insert 10 mg into rectum daily as needed (for constipation). Provider, Historical      PMHx:  has no past medical history on file. PSurgHx:  has no past surgical history on file. PSocHx:  has an unknown smoking status. He has never used smokeless tobacco. He reports that he does not drink alcohol and does not use drugs. ROS:  Admission ROS by Gustavo Dunbar MD from 2022 were reviewed with the patient and changes (other than per HPI) include: none. Physical Exam:    Vitals:   Temp (24hrs), Av.6 °F (37 °C), Min:98.6 °F (37 °C), Max:98.6 °F (37 °C)   Blood pressure (!) 157/96, pulse 78, temperature 98.6 °F (37 °C), resp. rate 20, height 6' 1\" (1.854 m), weight 50.3 kg (110 lb 14.3 oz), SpO2 93 %. I&O's:  No intake/output data recorded.   GENERAL: Alert male with spastic deformity  SKIN:  No clubbing, cyanosis, or edema  ABDOMEN: Soft, non-tender without masses  FLANKS: No CVAT  BLADDER: Not palpable  :  Normal male phallus, scrotum and contents normal  LYMPH: No cervical,k supraclavicular or axillary AMAN      Lab Results   Component Value Date/Time    WBC 4.2 2022 06:34 AM    HCT 48.8 2022 06:34 AM    PLATELET 501  06:34 AM    Sodium 138 2022 06:34 AM    Potassium 3.7 2022 06:34 AM    Chloride 107 2022 06:34 AM    CO2 25 2022 06:34 AM    BUN 12 2022 06:34 AM Creatinine 0.60 (L) 02/12/2022 06:34 AM    Glucose 155 (H) 02/12/2022 06:34 AM    Calcium 9.3 02/12/2022 06:34 AM       UA:   Lab Results   Component Value Date/Time    Color Yellow 12/31/2018 12:00 PM    Appearance Turbid (A) 12/31/2018 12:00 PM    pH (UA) 6.5 12/31/2018 12:00 PM    Ketone Negative 12/31/2018 12:00 PM    Bilirubin Negative 12/31/2018 12:00 PM    Nitrites Negative 12/31/2018 12:00 PM    Leukocyte Esterase 3+ (A) 12/31/2018 12:00 PM    Bacteria Few 12/31/2018 12:00 PM    WBC >30 (A) 12/31/2018 12:00 PM    RBC 11-30 (A) 12/31/2018 12:00 PM       Cultures:     Xrays:     Signed By: Michelle Adams MD  - February 12, 2022

## 2022-02-12 NOTE — H&P
HISTORY AND PHYSICAL  Emma Escoto MD        PCP: None    Please note that this dictation was completed with Field Agent, the computer voice recognition software. Quite often unanticipated grammatical, syntax, homophones, and other interpretive errors are inadvertently transcribed by the computer software. Please disregard these errors. Please excuse any errors that have escaped final proofreading. 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 Patient is alert and oriented. He says he has been coughing a lot since yesterday. He was reported to have nausea and vomiting and when asked him about swallowing he said he admitted he has some difficulty with swallowing. He denied fever or chills. He does not think he is vaccinated against COVID. Upon evaluation in the ED he was hypoxic with SPO2 of 86% and currently on 6 L/min with SPO2 95%  His blood work were grossly unremarkable. Rapid Covid test was negative. CXR showed right middle lobe pneumonia  She was started on ceftriaxone and Zithromax and consult for admission evaluation was requested. PMH/PSH:  History reviewed. No pertinent past medical history. No past surgical history on file. Home meds:   Prior to Admission medications    Medication Sig Start Date End Date Taking? Authorizing Provider   menthol-zinc oxide (CALMOSEPTINE) 0.44-20.6 % oint Apply 1 g to affected area as needed (apply generous amount to affected areas) for up to 100 doses. 3/19/19   Amarilis Colindres NP   DULoxetine (CYMBALTA) 60 mg capsule Take 1 Cap by mouth daily.  3/4/19   Amarilis Colindres NP   morphine CR (MS CONTIN) 15 mg CR tablet Take 1 Tab by mouth every twelve (12) hours. Max Daily Amount: 30 mg. 3/30/19   Amarilys Westfall NP   naloxone [facility]) 4 mg/actuation nasal spray Use 1 spray intranasally, then discard. Repeat with new spray every 2 min as needed for opioid overdose symptoms, alternating nostrils. 2/4/19   Amarilys Westfall NP   finasteride (PROSCAR) 5 mg tablet Take 1 Tab by mouth daily. 12/13/18   Amarilys Westfall NP   senna-docusate (PERICOLACE) 8.6-50 mg per tablet Take 2 Tabs by mouth two (2) times a day. 12/13/18   Amarilys Westfall NP   cyclobenzaprine (FLEXERIL) 5 mg tablet Take 1 Tab by mouth three (3) times daily as needed for Muscle Spasm(s). 12/13/18   Amairlys Westfall NP   polyethylene glycol (MIRALAX) 17 gram/dose powder Take 17 g by mouth daily. Provider, Historical   bisacodyl (DULCOLAX, BISACODYL,) 10 mg suppository Insert 10 mg into rectum daily as needed (for constipation). Provider, Historical       Allergies:  No Known Allergies    FH:  History reviewed. No pertinent family history. SH:  Social History     Tobacco Use    Smoking status: Unknown If Ever Smoked    Smokeless tobacco: Never Used   Substance Use Topics    Alcohol use: No       ROS: A comprehensive review of systems was negative except for that written in the HPI. PHYSICAL EXAM:    General:           Chronically sick looking, coughing but not in distress. HEENT:           He has surgical mask and nasal cannula. Neck:               Supple, symmetrical,  thyroid: non tender  Lungs:              Patient coughing with greenish sputum production. On oxygen via nasal cannula, breathing is not labored. Crepitations on the right lung posteriorly. Chest wall:      No tenderness  No Accessory muscle use. Heart:              Regular  rhythm,  No  murmur   No edema  Abdomen:        Soft, non-tender. Not distended. Bowel sounds normal  Extremities:    Both lower extremities are deformed and spastic at all joints.   Upper extremity hand joints are deformed but not as bad as the lower extremities. Neurologic:   Patient is grossly alert and oriented for place, person and time. Labs/Imaging:  Available labs and imaging studies reviewed. Assessment & Plan:  Bacterial pneumonia? Aspiration. Acute hypoxic respiratory failure due to pneumonia  --Initiate antibiotics with Zosyn and Zithromax to cover for healthcare associated aspiration and atypicals. --Rapid Covid test negative, checking PCR. Obtain sputum culture  --Supplemental oxygen to keep SPO2> 92%  --Bronchodilators as needed  --Dysphagia screen, n.p.o. in the meantime    Chronic RA and ankylosing spondylitis with significant spastic deformity of the extremities, more pronounced on the lower extremities. --No sign of active disease, so no indication for anti-inflammatories or steroids  --Patient is wheelchair-bound    Chronic pain, opioid dependent  --Continue MS Contin with as needed Percocet. --Antidote: Narcan            Patient's Baseline: Wheelchair-bound  DVT ppx: Lovenox  Code status: He comfort med DNR status and patient has a D -DNR on file signed by himself in 2018. Disposition: Back to long-term care facility once medically stable, he will probably need 3-5 days.                 Signed By: Curry Clark MD     February 12, 2022

## 2022-02-12 NOTE — ED PROVIDER NOTES
Patient is a 10year-old male coming from a SNF. He is bedbound at baseline and presenting with nausea vomiting and sputum production. Patient is normally on room air however now requiring 5 L nasal cannula. The history is provided by the patient, the nursing home and medical records. Vomiting   This is a new problem. The current episode started 6 to 12 hours ago. The problem occurs 5 to 10 times per day. The problem has not changed since onset. The emesis has an appearance of clear (Sputum). There has been no fever. Pertinent negatives include no fever, no abdominal pain and no diarrhea. The patient is not pregnant. Risk factors: Lives in nursing facility, minimal mobility, elderly. History reviewed. No pertinent past medical history. No past surgical history on file. History reviewed. No pertinent family history. Social History     Socioeconomic History    Marital status: SINGLE     Spouse name: Not on file    Number of children: Not on file    Years of education: Not on file    Highest education level: Not on file   Occupational History    Not on file   Tobacco Use    Smoking status: Unknown If Ever Smoked    Smokeless tobacco: Never Used   Substance and Sexual Activity    Alcohol use: No    Drug use: No    Sexual activity: Not Currently   Other Topics Concern    Not on file   Social History Narrative    Not on file     Social Determinants of Health     Financial Resource Strain:     Difficulty of Paying Living Expenses: Not on file   Food Insecurity:     Worried About Running Out of Food in the Last Year: Not on file    Telma of Food in the Last Year: Not on file   Transportation Needs:     Lack of Transportation (Medical): Not on file    Lack of Transportation (Non-Medical):  Not on file   Physical Activity:     Days of Exercise per Week: Not on file    Minutes of Exercise per Session: Not on file   Stress:     Feeling of Stress : Not on file   Social Connections:     Frequency of Communication with Friends and Family: Not on file    Frequency of Social Gatherings with Friends and Family: Not on file    Attends Jewish Services: Not on file    Active Member of Clubs or Organizations: Not on file    Attends Club or Organization Meetings: Not on file    Marital Status: Not on file   Intimate Partner Violence:     Fear of Current or Ex-Partner: Not on file    Emotionally Abused: Not on file    Physically Abused: Not on file    Sexually Abused: Not on file   Housing Stability:     Unable to Pay for Housing in the Last Year: Not on file    Number of [place] in the Last Year: Not on file    Unstable Housing in the Last Year: Not on file         ALLERGIES: Patient has no known allergies. Review of Systems   Constitutional: Negative. Negative for fever. HENT: Negative. Eyes: Negative. Respiratory: Positive for shortness of breath. Cardiovascular: Negative. Gastrointestinal: Positive for vomiting. Negative for abdominal pain and diarrhea. Endocrine: Negative. Genitourinary: Negative. Musculoskeletal: Negative. Skin: Negative. Allergic/Immunologic: Negative. Neurological: Negative. Hematological: Negative. Psychiatric/Behavioral: Negative. Vitals:    02/12/22 1300 02/12/22 1701 02/12/22 1830 02/12/22 1900   BP: (!) 153/81 (!) 154/99 (!) 157/96 (!) 173/88   Pulse: 74 82 78 75   Resp: 29 16 20 29   Temp:       SpO2: 94% 93% 93% 92%   Weight:       Height:                Physical Exam  Vitals and nursing note reviewed. Constitutional:       General: He is not in acute distress. Appearance: He is ill-appearing. Comments: Patient is underweight. Thin contracted legs, patient bedbound at baseline   HENT:      Head: Normocephalic and atraumatic. Right Ear: External ear normal.      Left Ear: External ear normal.      Nose: Nose normal.   Eyes:      Extraocular Movements: Extraocular movements intact. Conjunctiva/sclera: Conjunctivae normal.   Cardiovascular:      Rate and Rhythm: Normal rate. Pulses: Normal pulses. Radial pulses are 2+ on the right side and 2+ on the left side. Heart sounds: Normal heart sounds. Pulmonary:      Effort: Pulmonary effort is normal. Tachypnea present. Breath sounds: Normal breath sounds. Comments: Patient has cough with significant sputum production  Chest:      Chest wall: No deformity or tenderness. Abdominal:      General: Abdomen is flat. There is no distension. Tenderness: There is no abdominal tenderness. Musculoskeletal:         General: No deformity or signs of injury. Normal range of motion. Cervical back: Normal range of motion and neck supple. No tenderness. Skin:     General: Skin is warm and dry. Capillary Refill: Capillary refill takes less than 2 seconds. Neurological:      General: No focal deficit present. Mental Status: He is alert and oriented to person, place, and time. Psychiatric:         Attention and Perception: Attention normal.         Mood and Affect: Mood normal.         Behavior: Behavior normal.          [facility]  ED Course as of 02/12/22 1925   Sat Feb 12, 2022   0717 Contacted Elisha ivy for information from nursing staff. N/V started at 2300. Home O2: none. [AL]   [id number] Patient 86% on room air on arrival, now in the mid 90s on 5L NC. [AL]      ED Course User Index  [AL] Kylie Medrano MD     LABORATORY RESULTS:  Labs Reviewed   CBC WITH AUTOMATED DIFF - Abnormal; Notable for the following components:       Result Value    NEUTROPHILS 82 (*)     LYMPHOCYTES 9 (*)     ABS.  LYMPHOCYTES 0.4 (*)     All other components within normal limits   METABOLIC PANEL, COMPREHENSIVE - Abnormal; Notable for the following components:    Glucose 155 (*)     Creatinine 0.60 (*)     AST (SGOT) 61 (*)     Albumin 3.3 (*)     A-G Ratio 0.9 (*)     All other components within normal limits   LIPASE - Abnormal; Notable for the following components:    Lipase 34 (*)     All other components within normal limits   COVID-19 RAPID TEST   CULTURE, BLOOD, PAIRED   CULTURE, RESPIRATORY/SPUTUM/BRONCH W GRAM STAIN   URINE CULTURE HOLD SAMPLE   SAMPLES BEING HELD   TROPONIN-HIGH SENSITIVITY   PROCALCITONIN   SARS-COV-2   URINALYSIS W/MICROSCOPIC       IMAGING RESULTS:  XR CHEST PORT   Final Result   Right airspace disease compatible with pneumonia. MEDICATIONS GIVEN:  Medications   azithromycin (ZITHROMAX) 500 mg in 0.9% sodium chloride 250 mL (VIAL-MATE) (500 mg IntraVENous New Bag 2/12/22 1016)   piperacillin-tazobactam (ZOSYN) 3.375 g in 0.9% sodium chloride (MBP/ADV) 100 mL MBP (3.375 g IntraVENous New Bag 2/12/22 1156)       Differential diagnosis: Pneumonia, nausea and vomiting, aspiration pneumonia, COVID-19    ED physician interpretation of imaging: Chest x-ray consistent with pneumonia in the right lower lobe  ED physician interpretation of laboratory results: Lab work without critical values requiring emergency medical intervention. Additional labs ordered for possible admission including blood cultures and procalcitonin for possible pneumonia    MDM: Patient is a 49-year-old male with complex medical history presented to ED with nausea vomiting and hypoxia found to have pneumonia requiring hospital admission due to elevated port score and significant mortality risk. Antibiotics given for hospital-acquired pneumonia. Hospitalist service contacted and patient admitted. Patient's vital signs are stable and patient is afebrile. DISPOSITION: Admitted    Perfect Serve Consult for Admission  9:49 AM    ED Room Number: ER09/09  Patient Name and age:  Jerel Chester 80 y.o.  male  Working Diagnosis:   1.  Pneumonia of right lung due to infectious organism, unspecified part of lung        COVID-19 Suspicion:  yes rapid Covid negative however patient is from facility  Sepsis present:  no  Reassessment needed: yes  Code Status:  Do Not Resuscitate  Readmission: no  Isolation Requirements:  no  Recommended Level of Care:  med/surg  Department:  [facility] Adult ED - 21     Other: Patient presenting with vomiting found to have hypoxia in the mid 80s on room air requiring nasal cannula. Chest x-ray shows pneumonia. Blood cultures drawn, procalcitonin drawn and antibiotics started. Tao Moran.  Vandana Pascual MD        Procedures

## 2022-02-12 NOTE — ED NOTES
Unable to straight cath pt at this time. , 2 nurses attempted with coude tip and unsuccessful. MD notified and STAT urology consult placed. Awaiting urology to place caruso.

## 2022-02-12 NOTE — ED TRIAGE NOTES
EMS reports the pt comes from [facility] for N/V.        Pt reports abdominal pain  Pt denies CP, SOB

## 2022-02-13 ENCOUNTER — APPOINTMENT (OUTPATIENT)
Dept: GENERAL RADIOLOGY | Age: 87
DRG: 177 | End: 2022-02-13
Attending: HOSPITALIST
Payer: MEDICARE

## 2022-02-13 LAB
ALBUMIN SERPL-MCNC: 3 G/DL (ref 3.5–5)
ALBUMIN/GLOB SERPL: 0.7 {RATIO} (ref 1.1–2.2)
ALP SERPL-CCNC: 69 U/L (ref 45–117)
ALT SERPL-CCNC: 64 U/L (ref 12–78)
ANION GAP SERPL CALC-SCNC: 6 MMOL/L (ref 5–15)
AST SERPL-CCNC: 43 U/L (ref 15–37)
BASOPHILS # BLD: 0 K/UL (ref 0–0.1)
BASOPHILS NFR BLD: 0 % (ref 0–1)
BILIRUB SERPL-MCNC: 1.1 MG/DL (ref 0.2–1)
BUN SERPL-MCNC: 18 MG/DL (ref 6–20)
BUN/CREAT SERPL: 33 (ref 12–20)
CALCIUM SERPL-MCNC: 8.9 MG/DL (ref 8.5–10.1)
CHLORIDE SERPL-SCNC: 111 MMOL/L (ref 97–108)
CO2 SERPL-SCNC: 24 MMOL/L (ref 21–32)
CREAT SERPL-MCNC: 0.54 MG/DL (ref 0.7–1.3)
DIFFERENTIAL METHOD BLD: ABNORMAL
EOSINOPHIL # BLD: 0 K/UL (ref 0–0.4)
EOSINOPHIL NFR BLD: 0 % (ref 0–7)
ERYTHROCYTE [DISTWIDTH] IN BLOOD BY AUTOMATED COUNT: 13.6 % (ref 11.5–14.5)
GLOBULIN SER CALC-MCNC: 4.1 G/DL (ref 2–4)
GLUCOSE SERPL-MCNC: 150 MG/DL (ref 65–100)
HCT VFR BLD AUTO: 46.2 % (ref 36.6–50.3)
HGB BLD-MCNC: 15.7 G/DL (ref 12.1–17)
IMM GRANULOCYTES # BLD AUTO: 0 K/UL
IMM GRANULOCYTES NFR BLD AUTO: 0 %
LYMPHOCYTES # BLD: 0.9 K/UL (ref 0.8–3.5)
LYMPHOCYTES NFR BLD: 8 % (ref 12–49)
MCH RBC QN AUTO: 32.2 PG (ref 26–34)
MCHC RBC AUTO-ENTMCNC: 34 G/DL (ref 30–36.5)
MCV RBC AUTO: 94.9 FL (ref 80–99)
METAMYELOCYTES NFR BLD MANUAL: 2 %
MONOCYTES # BLD: 0.8 K/UL (ref 0–1)
MONOCYTES NFR BLD: 7 % (ref 5–13)
NEUTS BAND NFR BLD MANUAL: 14 % (ref 0–6)
NEUTS SEG # BLD: 9.5 K/UL (ref 1.8–8)
NEUTS SEG NFR BLD: 69 % (ref 32–75)
NRBC # BLD: 0 K/UL (ref 0–0.01)
NRBC BLD-RTO: 0 PER 100 WBC
PLATELET # BLD AUTO: 258 K/UL (ref 150–400)
PMV BLD AUTO: 9.6 FL (ref 8.9–12.9)
POTASSIUM SERPL-SCNC: 3.8 MMOL/L (ref 3.5–5.1)
PROT SERPL-MCNC: 7.1 G/DL (ref 6.4–8.2)
RBC # BLD AUTO: 4.87 M/UL (ref 4.1–5.7)
RBC MORPH BLD: ABNORMAL
RBC MORPH BLD: ABNORMAL
SODIUM SERPL-SCNC: 141 MMOL/L (ref 136–145)
WBC # BLD AUTO: 11.5 K/UL (ref 4.1–11.1)

## 2022-02-13 PROCEDURE — 77010033678 HC OXYGEN DAILY

## 2022-02-13 PROCEDURE — 74011250637 HC RX REV CODE- 250/637: Performed by: HOSPITALIST

## 2022-02-13 PROCEDURE — 94760 N-INVAS EAR/PLS OXIMETRY 1: CPT

## 2022-02-13 PROCEDURE — 80053 COMPREHEN METABOLIC PANEL: CPT

## 2022-02-13 PROCEDURE — 99221 1ST HOSP IP/OBS SF/LOW 40: CPT | Performed by: SURGERY

## 2022-02-13 PROCEDURE — 85025 COMPLETE CBC W/AUTO DIFF WBC: CPT

## 2022-02-13 PROCEDURE — 36415 COLL VENOUS BLD VENIPUNCTURE: CPT

## 2022-02-13 PROCEDURE — 74011000250 HC RX REV CODE- 250: Performed by: HOSPITALIST

## 2022-02-13 PROCEDURE — 74011000258 HC RX REV CODE- 258: Performed by: HOSPITALIST

## 2022-02-13 PROCEDURE — 74011250636 HC RX REV CODE- 250/636: Performed by: STUDENT IN AN ORGANIZED HEALTH CARE EDUCATION/TRAINING PROGRAM

## 2022-02-13 PROCEDURE — 74011250636 HC RX REV CODE- 250/636: Performed by: HOSPITALIST

## 2022-02-13 PROCEDURE — 65660000000 HC RM CCU STEPDOWN

## 2022-02-13 PROCEDURE — 74018 RADEX ABDOMEN 1 VIEW: CPT

## 2022-02-13 RX ORDER — FACIAL-BODY WIPES
10 EACH TOPICAL ONCE
Status: COMPLETED | OUTPATIENT
Start: 2022-02-13 | End: 2022-02-13

## 2022-02-13 RX ORDER — TAMSULOSIN HYDROCHLORIDE 0.4 MG/1
0.4 CAPSULE ORAL DAILY
Status: DISCONTINUED | OUTPATIENT
Start: 2022-02-13 | End: 2022-02-17 | Stop reason: HOSPADM

## 2022-02-13 RX ORDER — HYDROMORPHONE HYDROCHLORIDE 1 MG/ML
0.2 INJECTION, SOLUTION INTRAMUSCULAR; INTRAVENOUS; SUBCUTANEOUS
Status: DISCONTINUED | OUTPATIENT
Start: 2022-02-13 | End: 2022-02-17 | Stop reason: HOSPADM

## 2022-02-13 RX ADMIN — MORPHINE SULFATE 15 MG: 15 TABLET, FILM COATED, EXTENDED RELEASE ORAL at 21:04

## 2022-02-13 RX ADMIN — SODIUM CHLORIDE, PRESERVATIVE FREE 10 ML: 5 INJECTION INTRAVENOUS at 06:36

## 2022-02-13 RX ADMIN — ENOXAPARIN SODIUM 40 MG: 100 INJECTION SUBCUTANEOUS at 11:29

## 2022-02-13 RX ADMIN — HYDROMORPHONE HYDROCHLORIDE 0.2 MG: 1 INJECTION, SOLUTION INTRAMUSCULAR; INTRAVENOUS; SUBCUTANEOUS at 11:29

## 2022-02-13 RX ADMIN — HYDROCODONE BITARTRATE AND ACETAMINOPHEN 1 TABLET: 5; 325 TABLET ORAL at 18:52

## 2022-02-13 RX ADMIN — BISACODYL 10 MG: 10 SUPPOSITORY RECTAL at 09:53

## 2022-02-13 RX ADMIN — PIPERACILLIN AND TAZOBACTAM 3.38 G: 3; .375 INJECTION, POWDER, LYOPHILIZED, FOR SOLUTION INTRAVENOUS at 21:05

## 2022-02-13 RX ADMIN — PIPERACILLIN AND TAZOBACTAM 3.38 G: 3; .375 INJECTION, POWDER, LYOPHILIZED, FOR SOLUTION INTRAVENOUS at 03:42

## 2022-02-13 RX ADMIN — SODIUM CHLORIDE, PRESERVATIVE FREE 10 ML: 5 INJECTION INTRAVENOUS at 14:10

## 2022-02-13 RX ADMIN — AZITHROMYCIN MONOHYDRATE 500 MG: 500 INJECTION, POWDER, LYOPHILIZED, FOR SOLUTION INTRAVENOUS at 09:53

## 2022-02-13 RX ADMIN — PIPERACILLIN AND TAZOBACTAM 3.38 G: 3; .375 INJECTION, POWDER, LYOPHILIZED, FOR SOLUTION INTRAVENOUS at 11:29

## 2022-02-13 RX ADMIN — SENNOSIDES AND DOCUSATE SODIUM 2 TABLET: 8.6; 5 TABLET ORAL at 18:52

## 2022-02-13 NOTE — PROGRESS NOTES
Bedside and Verbal shift change report given to [address] [place] [address] Way (oncoming nurse) by Paul Shelton RN (offgoing nurse). Report included the following information SBAR, Kardex, Intake/Output, MAR and Recent Results.

## 2022-02-13 NOTE — PROGRESS NOTES
Progress Note   Leti Moise MD        PCP: None    Please note that this dictation was completed with [employer], the computer voice recognition software. Quite often unanticipated grammatical, syntax, homophones, and other interpretive errors are inadvertently transcribed by the computer software. Please disregard these errors. Please excuse any errors that have escaped final proofreading. Admission history   This is an 80year-old elderly gentleman with a PMH significant for ankylosing spondylitis, advanced to the RA with spasticity deformities of extremities more pronounced with lower extremities and is wheelchair-bound, chronic pain on chronic opioids presented to the ED for the above. Patient is alert and oriented. He says he has been coughing a lot since yesterday. He was reported to have nausea and vomiting and when asked him about swallowing he said he admitted he has some difficulty with swallowing. He denied fever or chills. He does not think he is vaccinated against COVID. Upon evaluation in the ED he was hypoxic with SPO2 of 86% and currently on 6 L/min with SPO2 95%  His blood work were grossly unremarkable. Rapid Covid test was negative. CXR showed right middle lobe pneumonia  She was started on ceftriaxone and Zithromax and consult for admission evaluation was requested. Subjectively  02/13/22  -resting quietly. No nausea, vomiting since admission. Asking for his pain pills. Assessment & Plan:  Bacterial pneumonia probably aspiration. Acute hypoxic respiratory failure due to pneumonia  --Continue with  Zosyn and Zithromax   --Rapid Covid test  And PCR. ,Sputum gram stain positive for gram negative,culture in process. --Supplemental oxygen to keep SPO2> 92%  --Bronchodilators as needed  --NPO      SBO  --CT a/p showed SBO. No more nausea, vomiting since admission,no abdomina distension. Abdominal exam benign. --Keep NPO.KUB. If he develops persistent nausea and vomiting. ..will need NGT. Surgical consult     Acute urinary retention,max had difficulty with caruso insertion due to his anatomy. Urology placed caruso and recommended to leave caruso in for at least 48 hours   --continue with Flomax and Proscar. Chronic RA and ankylosing spondylitis with significant spastic deformity of the extremities, more pronounced on the lower extremities. --No sign of active disease, so no indication for anti-inflammatories or steroids  --Patient is wheelchair-bound    Chronic pain, opioid dependent  --Continue MS Contin with as needed Percocet. --Antidote: Narcan            Patient's Baseline: Wheelchair-bound  DVT ppx: Lovenox  Code status: He comfort med DNR status and patient has a D -DNR on file signed by himself in 2018. Disposition: Back to long-term care facility once medically stable, he will probably need 3-5 days.           Current Facility-Administered Medications   Medication Dose Route Frequency    tamsulosin (FLOMAX) capsule 0.4 mg  0.4 mg Oral DAILY    HYDROmorphone (DILAUDID) injection 0.2 mg  0.2 mg IntraVENous Q6H PRN    bisacodyL (DULCOLAX) suppository 10 mg  10 mg Rectal ONCE    azithromycin (ZITHROMAX) 500 mg in 0.9% sodium chloride 250 mL (VIAL-MATE)  500 mg IntraVENous Q24H    sodium chloride (NS) flush 5-40 mL  5-40 mL IntraVENous Q8H    sodium chloride (NS) flush 5-40 mL  5-40 mL IntraVENous PRN    HYDROcodone-acetaminophen (NORCO) 5-325 mg per tablet 1 Tablet  1 Tablet Oral Q4H PRN    naloxone (NARCAN) injection 0.4 mg  0.4 mg IntraVENous PRN    enoxaparin (LOVENOX) injection 40 mg  40 mg SubCUTAneous Q24H    piperacillin-tazobactam (ZOSYN) 3.375 g in 0.9% sodium chloride (MBP/ADV) 100 mL MBP  3.375 g IntraVENous Q8H    bisacodyL (DULCOLAX) suppository 10 mg  10 mg Rectal DAILY PRN    DULoxetine (CYMBALTA) capsule 60 mg  60 mg Oral DAILY    finasteride (PROSCAR) tablet 5 mg  5 mg Oral DAILY    morphine CR (MS CONTIN) tablet 15 mg  15 mg Oral Q12H    senna-docusate (PERICOLACE) 8.6-50 mg per tablet 2 Tablet  2 Tablet Oral BID    albuterol-ipratropium (DUO-NEB) 2.5 MG-0.5 MG/3 ML  3 mL Nebulization Q6H PRN             PMH/PSH:  History reviewed. No pertinent past medical history. No past surgical history on file. Home meds:   Prior to Admission medications    Medication Sig Start Date End Date Taking? Authorizing Provider   menthol-zinc oxide (CALMOSEPTINE) 0.44-20.6 % oint Apply 1 g to affected area as needed (apply generous amount to affected areas) for up to 100 doses. 3/19/19   Chris Marin NP   DULoxetine (CYMBALTA) 60 mg capsule Take 1 Cap by mouth daily. 3/4/19   Chris Marin NP   morphine CR (MS CONTIN) 15 mg CR tablet Take 1 Tab by mouth every twelve (12) hours. Max Daily Amount: 30 mg. 3/30/19   Chris Marin NP   naloxone [facility]) 4 mg/actuation nasal spray Use 1 spray intranasally, then discard. Repeat with new spray every 2 min as needed for opioid overdose symptoms, alternating nostrils. 2/4/19   Chris Marin NP   finasteride (PROSCAR) 5 mg tablet Take 1 Tab by mouth daily. 12/13/18   Chris Marin NP   senna-docusate (PERICOLACE) 8.6-50 mg per tablet Take 2 Tabs by mouth two (2) times a day. 12/13/18   Chris Marin NP   cyclobenzaprine (FLEXERIL) 5 mg tablet Take 1 Tab by mouth three (3) times daily as needed for Muscle Spasm(s). 12/13/18   Chris Marin NP   polyethylene glycol (MIRALAX) 17 gram/dose powder Take 17 g by mouth daily. Provider, Historical   bisacodyl (DULCOLAX, BISACODYL,) 10 mg suppository Insert 10 mg into rectum daily as needed (for constipation). Provider, Historical       Allergies:  No Known Allergies    FH:  History reviewed. No pertinent family history.     SH:  Social History     Tobacco Use    Smoking status: Unknown If Ever Smoked    Smokeless tobacco: Never Used   Substance Use Topics    Alcohol use: No       ROS: A comprehensive review of systems was negative except for that written in the HPI.      PHYSICAL EXAM:    General:           Resting quietly   HEENT:           No pallor,jaundice. Nasal cannula present    Neck:               Supple, symmetrical,  thyroid: non tender  Lungs: On oxygen via nasal cannula, breathing is not labored. Crepitations on the right lung posteriorly. Chest wall:      No tenderness  No Accessory muscle use. Heart:              Regular  rhythm,  No  murmur   No edema  Abdomen:        Soft, non-tender. Not distended. Bowel sounds normal  Extremities:    Both lower extremities are deformed and spastic at all joints. Upper extremity hand joints are deformed but not as bad as the lower extremities. Neurologic:   Patient is grossly alert and oriented for place, person and time. Labs/Imaging:  Available labs and imaging studies reviewed.         Signed By: Pamela Loving MD     February 13, 2022

## 2022-02-13 NOTE — PROGRESS NOTES
TRANSFER - IN REPORT:    Verbal report received from Lloyd(name) on Danilo Mejias  being received from ED(unit) for routine progression of care      Report consisted of patients Situation, Background, Assessment and   Recommendations(SBAR). Information from the following report(s) SBAR, Kardex, [facility] and Recent Results was reviewed with the receiving nurse. Opportunity for questions and clarification was provided. Assessment completed upon patients arrival to unit and care assumed.

## 2022-02-13 NOTE — CONSULTS
Surgery Consult    Subjective:      Candance Mortimer is a 80 y.o. male who we have been asked to see for possible small bowel obstruction. The patient was admitted to the hospital with cough and hypoxia. He also has difficulty swallowing. He developed nausea and vomiting and came to the hospital.  He states before that he is usually able to eat whatever he wants. His chest x-ray showed a middle lobe pneumonia. There was concern that this was due to aspiration. He currently states that he is not having any nausea or vomiting. He denies any previous abdominal surgery though it sounds like he has had a TURP    Patient Active Problem List    Diagnosis Date Noted    Pneumonia 02/12/2022    Acute hypoxemic respiratory failure ([facility] 02/12/2022    Aspiration pneumonia ([address] 01/09/2019    Ankylosing spondylitis of multiple sites in spine ([facility] 01/09/2019    Memory impairment 01/09/2019    Closed nondisplaced fracture of seventh cervical vertebra with routine healing 01/09/2019    Closed T7 fracture ([facility] [address] 01/09/2019    Bed confinement status 01/09/2019    Drug-induced constipation 01/09/2019    Rheumatoid arthritis involving multiple sites with positive rheumatoid factor ([facility] [address] 12/03/2018     ? TURP  History reviewed. No pertinent past medical history. No past surgical history on file. Social History     Tobacco Use    Smoking status: Unknown If Ever Smoked    Smokeless tobacco: Never Used   Substance Use Topics    Alcohol use: No      History reviewed. No pertinent family history.    Current Facility-Administered Medications   Medication Dose Route Frequency    tamsulosin (FLOMAX) capsule 0.4 mg  0.4 mg Oral DAILY    HYDROmorphone (DILAUDID) injection 0.2 mg  0.2 mg IntraVENous Q6H PRN    azithromycin (ZITHROMAX) 500 mg in 0.9% sodium chloride 250 mL (VIAL-MATE)  500 mg IntraVENous Q24H    sodium chloride (NS) flush 5-40 mL  5-40 mL IntraVENous Q8H    sodium chloride (NS) flush 5-40 mL  5-40 mL IntraVENous PRN    HYDROcodone-acetaminophen (NORCO) 5-325 mg per tablet 1 Tablet  1 Tablet Oral Q4H PRN    naloxone (NARCAN) injection 0.4 mg  0.4 mg IntraVENous PRN    enoxaparin (LOVENOX) injection 40 mg  40 mg SubCUTAneous Q24H    piperacillin-tazobactam (ZOSYN) 3.375 g in 0.9% sodium chloride (MBP/ADV) 100 mL MBP  3.375 g IntraVENous Q8H    bisacodyL (DULCOLAX) suppository 10 mg  10 mg Rectal DAILY PRN    DULoxetine (CYMBALTA) capsule 60 mg  60 mg Oral DAILY    finasteride (PROSCAR) tablet 5 mg  5 mg Oral DAILY    morphine CR (MS CONTIN) tablet 15 mg  15 mg Oral Q12H    senna-docusate (PERICOLACE) 8.6-50 mg per tablet 2 Tablet  2 Tablet Oral BID    albuterol-ipratropium (DUO-NEB) 2.5 MG-0.5 MG/3 ML  3 mL Nebulization Q6H PRN      No Known Allergies    Review of Systems:    Pertinent items are noted in the History of Present Illness. Objective:        Visit Vitals  BP (!) 151/69 (BP 1 Location: Right arm, BP Patient Position: At rest)   Pulse 62   Temp 98.7 °F (37.1 °C)   Resp 18   Ht 6' 1\" (1.854 m)   Wt 50.3 kg (110 lb 14.3 oz)   SpO2 96%   BMI 14.63 kg/m²       Physical Exam:  General alert in no acute distress  Lungs clear bilaterally  Heart regular rate and rhythm  Abdomen soft nontender nondistended  Extremities contracted    Imaging:  images and reports reviewed  CT- FINDINGS:   There is marked prostatomegaly measuring 8 x 8 x 7 cm. The prostate contains two central calcifications measuring 5 mm and 3 mm which  might be within the prostatic urethra.     The bladder is distended.     There is no other urinary tract stone. There is no hydroureteronephrosis.     Stomach and proximal small bowel are distended with gas and fluid compatible  small bowel obstruction, etiology not identified. KUB- 1. Persistent small bowel obstruction. No free air  2. Ankylosing spondylitis  Lab/Data Review: All lab results for the last 24 hours reviewed.     Recent Results (from the past 24 hour(s))   URINE CULTURE HOLD SAMPLE    Collection Time: 02/12/22  6:36 PM    Specimen: Serum; Urine   Result Value Ref Range    Urine culture hold        Urine on hold in Microbiology dept for 2 days. If unpreserved urine is submitted, it cannot be used for addtional testing after 24 hours, recollection will be required. URINALYSIS W/MICROSCOPIC    Collection Time: 02/12/22  6:36 PM   Result Value Ref Range    Color DARK YELLOW      Appearance CLEAR CLEAR      Specific gravity 1.021 1.003 - 1.030      pH (UA) 5.5 5.0 - 8.0      Protein 30 (A) NEG mg/dL    Glucose Negative NEG mg/dL    Ketone TRACE (A) NEG mg/dL    Bilirubin Negative NEG      Blood MODERATE (A) NEG      Urobilinogen 0.2 0.2 - 1.0 EU/dL    Nitrites Negative NEG      Leukocyte Esterase SMALL (A) NEG      WBC 5-10 0 - 4 /hpf    RBC 5-10 0 - 5 /hpf    Epithelial cells FEW FEW /lpf    Bacteria Negative NEG /hpf    Yeast PRESENT (A) NEG      Budding yeast PRESENT (A) NEG      Yeast w/hyphae PRESENT (A) NEG     METABOLIC PANEL, COMPREHENSIVE    Collection Time: 02/13/22  1:32 AM   Result Value Ref Range    Sodium 141 136 - 145 mmol/L    Potassium 3.8 3.5 - 5.1 mmol/L    Chloride 111 (H) 97 - 108 mmol/L    CO2 24 21 - 32 mmol/L    Anion gap 6 5 - 15 mmol/L    Glucose 150 (H) 65 - 100 mg/dL    BUN 18 6 - 20 MG/DL    Creatinine 0.54 (L) 0.70 - 1.30 MG/DL    BUN/Creatinine ratio 33 (H) 12 - 20      GFR est AA >60 >60 ml/min/1.73m2    GFR est non-AA >60 >60 ml/min/1.73m2    Calcium 8.9 8.5 - 10.1 MG/DL    Bilirubin, total 1.1 (H) 0.2 - 1.0 MG/DL    ALT (SGPT) 64 12 - 78 U/L    AST (SGOT) 43 (H) 15 - 37 U/L    Alk.  phosphatase 69 45 - 117 U/L    Protein, total 7.1 6.4 - 8.2 g/dL    Albumin 3.0 (L) 3.5 - 5.0 g/dL    Globulin 4.1 (H) 2.0 - 4.0 g/dL    A-G Ratio 0.7 (L) 1.1 - 2.2     CBC WITH AUTOMATED DIFF    Collection Time: 02/13/22  1:32 AM   Result Value Ref Range    WBC 11.5 (H) 4.1 - 11.1 K/uL    RBC 4.87 4.10 - 5.70 M/uL    HGB 15.7 12.1 - 17.0 g/dL    HCT 46.2 36.6 - 50.3 %    MCV 94.9 80.0 - 99.0 FL    MCH 32.2 26.0 - 34.0 PG    MCHC 34.0 30.0 - 36.5 g/dL    RDW 13.6 11.5 - 14.5 %    PLATELET 894 824 - 625 K/uL    MPV 9.6 8.9 - 12.9 FL    NRBC 0.0 0  WBC    ABSOLUTE NRBC 0.00 0.00 - 0.01 K/uL    NEUTROPHILS 69 32 - 75 %    BAND NEUTROPHILS 14 (H) 0 - 6 %    LYMPHOCYTES 8 (L) 12 - 49 %    MONOCYTES 7 5 - 13 %    EOSINOPHILS 0 0 - 7 %    BASOPHILS 0 0 - 1 %    METAMYELOCYTES 2 (H) 0 %    IMMATURE GRANULOCYTES 0 %    ABS. NEUTROPHILS 9.5 (H) 1.8 - 8.0 K/UL    ABS. LYMPHOCYTES 0.9 0.8 - 3.5 K/UL    ABS. MONOCYTES 0.8 0.0 - 1.0 K/UL    ABS. EOSINOPHILS 0.0 0.0 - 0.4 K/UL    ABS. BASOPHILS 0.0 0.0 - 0.1 K/UL    ABS. IMM. GRANS. 0.0 K/UL    DF MANUAL      RBC COMMENTS ANISOCYTOSIS  1+        RBC COMMENTS ATYPICAL LYMPHOCYTES PRESENT         Assessment:PLan    80year-old male with possible small bowel obstruction. Currently he appears to be asymptomatic despite his x-ray still showing signs of obstruction. His stomach does not look as distended on the x-ray as it did previously. Would keep n.p.o. except for sips and meds today. If he remains asymptomatic may be able to start clear liquids tomorrow. Would get repeat x-ray tomorrow. Hopefully this can be treated conservatively as the patient does not appear to be a great surgical candidate.

## 2022-02-14 ENCOUNTER — APPOINTMENT (OUTPATIENT)
Dept: GENERAL RADIOLOGY | Age: 87
DRG: 177 | End: 2022-02-14
Attending: SURGERY
Payer: MEDICARE

## 2022-02-14 PROBLEM — K56.690 OTHER PARTIAL INTESTINAL OBSTRUCTION (HCC): Status: ACTIVE | Noted: 2022-02-14

## 2022-02-14 LAB
BACTERIA SPEC CULT: ABNORMAL
BACTERIA SPEC CULT: ABNORMAL
GRAM STN SPEC: ABNORMAL
SERVICE CMNT-IMP: ABNORMAL

## 2022-02-14 PROCEDURE — 74018 RADEX ABDOMEN 1 VIEW: CPT

## 2022-02-14 PROCEDURE — 74011250637 HC RX REV CODE- 250/637: Performed by: HOSPITALIST

## 2022-02-14 PROCEDURE — 74011000258 HC RX REV CODE- 258: Performed by: HOSPITALIST

## 2022-02-14 PROCEDURE — 74011250636 HC RX REV CODE- 250/636: Performed by: STUDENT IN AN ORGANIZED HEALTH CARE EDUCATION/TRAINING PROGRAM

## 2022-02-14 PROCEDURE — 74011000250 HC RX REV CODE- 250: Performed by: HOSPITALIST

## 2022-02-14 PROCEDURE — 74011250636 HC RX REV CODE- 250/636: Performed by: HOSPITALIST

## 2022-02-14 PROCEDURE — 65660000000 HC RM CCU STEPDOWN

## 2022-02-14 PROCEDURE — 99232 SBSQ HOSP IP/OBS MODERATE 35: CPT | Performed by: SURGERY

## 2022-02-14 RX ADMIN — PIPERACILLIN AND TAZOBACTAM 3.38 G: 3; .375 INJECTION, POWDER, LYOPHILIZED, FOR SOLUTION INTRAVENOUS at 20:46

## 2022-02-14 RX ADMIN — FINASTERIDE 5 MG: 5 TABLET, FILM COATED ORAL at 09:07

## 2022-02-14 RX ADMIN — HYDROCODONE BITARTRATE AND ACETAMINOPHEN 1 TABLET: 5; 325 TABLET ORAL at 02:01

## 2022-02-14 RX ADMIN — DULOXETINE HYDROCHLORIDE 60 MG: 60 CAPSULE, DELAYED RELEASE ORAL at 09:07

## 2022-02-14 RX ADMIN — HYDROMORPHONE HYDROCHLORIDE 0.2 MG: 1 INJECTION, SOLUTION INTRAMUSCULAR; INTRAVENOUS; SUBCUTANEOUS at 03:59

## 2022-02-14 RX ADMIN — MORPHINE SULFATE 15 MG: 15 TABLET, FILM COATED, EXTENDED RELEASE ORAL at 22:15

## 2022-02-14 RX ADMIN — SODIUM CHLORIDE, PRESERVATIVE FREE 10 ML: 5 INJECTION INTRAVENOUS at 01:51

## 2022-02-14 RX ADMIN — SENNOSIDES AND DOCUSATE SODIUM 2 TABLET: 8.6; 5 TABLET ORAL at 09:07

## 2022-02-14 RX ADMIN — TAMSULOSIN HYDROCHLORIDE 0.4 MG: 0.4 CAPSULE ORAL at 09:07

## 2022-02-14 RX ADMIN — SODIUM CHLORIDE, PRESERVATIVE FREE 10 ML: 5 INJECTION INTRAVENOUS at 17:40

## 2022-02-14 RX ADMIN — ENOXAPARIN SODIUM 40 MG: 100 INJECTION SUBCUTANEOUS at 12:02

## 2022-02-14 RX ADMIN — MORPHINE SULFATE 15 MG: 15 TABLET, FILM COATED, EXTENDED RELEASE ORAL at 09:07

## 2022-02-14 RX ADMIN — PIPERACILLIN AND TAZOBACTAM 3.38 G: 3; .375 INJECTION, POWDER, LYOPHILIZED, FOR SOLUTION INTRAVENOUS at 12:02

## 2022-02-14 RX ADMIN — SODIUM CHLORIDE, PRESERVATIVE FREE 10 ML: 5 INJECTION INTRAVENOUS at 22:16

## 2022-02-14 RX ADMIN — PIPERACILLIN AND TAZOBACTAM 3.38 G: 3; .375 INJECTION, POWDER, LYOPHILIZED, FOR SOLUTION INTRAVENOUS at 03:53

## 2022-02-14 RX ADMIN — AZITHROMYCIN MONOHYDRATE 500 MG: 500 INJECTION, POWDER, LYOPHILIZED, FOR SOLUTION INTRAVENOUS at 09:06

## 2022-02-14 RX ADMIN — SENNOSIDES AND DOCUSATE SODIUM 2 TABLET: 8.6; 5 TABLET ORAL at 17:40

## 2022-02-14 RX ADMIN — HYDROCODONE BITARTRATE AND ACETAMINOPHEN 1 TABLET: 5; 325 TABLET ORAL at 07:04

## 2022-02-14 NOTE — PROGRESS NOTES
Admit Date: 2022      POD * No surgery found *  * No surgery found *      Procedure:  * No surgery found *        HOSPITAL DAY:     ANTIBIOTICS:      HPI:  Patient very talkative, short-term memory seems to be lacking, no nausea or vomiting, some flatus, although his history may be somewhat unreliable, but no abdominal pain nausea resolved yesterday. Patient requesting [employer]. Abdominal x-rays showed nonspecific distention this morning, does not appear to be obstruction and there is plenty of colonic air, on my viewing of the x-ray  Temp:  [97.6 °F (36.4 °C)-98.9 °F (37.2 °C)]   Pulse (Heart Rate):  [43-82]   BP: (115-192)/(68-99)   Resp Rate:  [16-32]   O2 Sat (%):  [84 %-99 %]   Weight:  [48.1 kg (106 lb 1.6 oz)-50.3 kg (110 lb 14.3 oz)]       Intake and Output:  Current Shift: No intake/output data recorded. Last three shifts:  1901 -  0700  In: 900 [I.V.:900]  Out: 710 [Urine:710]     Blood pressure (!) 158/68, pulse 60, temperature 97.7 °F (36.5 °C), resp. rate 20, height 6' 1\" (1.854 m), weight 48.1 kg (106 lb 1.6 oz), SpO2 94 %. Temp (24hrs), Av.2 °F (36.8 °C), Min:97.6 °F (36.4 °C), Max:98.9 °F (37.2 °C)        Review of Systems   Respiratory: Negative. Cardiovascular: Negative. Gastrointestinal: Negative. All other systems reviewed and are negative. Physical Exam  Vitals and nursing note reviewed. Exam conducted with a chaperone present (ELENITA Desai). Constitutional:       Comments: Very cachectic chronically ill-appearing but no acute distress   HENT:      Head:      Comments: Significant temporal wasting  Cardiovascular:      Rate and Rhythm: Normal rate. Pulmonary:      Effort: Pulmonary effort is normal.   Abdominal:      General: Abdomen is flat. There is no distension. Palpations: Abdomen is soft. Tenderness: There is no abdominal tenderness. There is no guarding. Comments: Good bowel sounds   Skin:     General: Skin is warm and dry. Neurological:      Mental Status: He is alert. Comments: Patient alert and talkative although history and short-term memory of question   Psychiatric:         Mood and Affect: Mood normal.         Recent Results (from the past 48 hour(s))   CULTURE, RESPIRATORY/SPUTUM/BRONCH W GRAM STAIN    Collection Time: 02/12/22 12:03 PM    Specimen: Sputum   Result Value Ref Range    Special Requests: NO SPECIAL REQUESTS      GRAM STAIN 1+ WBCS SEEN      GRAM STAIN 2+ GRAM NEGATIVE RODS      GRAM STAIN RARE APPARENT YEAST      Culture result: HEAVY KLEBSIELLA PNEUMONIAE (A)      Culture result: HEAVY NORMAL RESPIRATORY ESTHER         Susceptibility    Klebsiella pneumoniae - CIRA     Amikacin ($) <=2 Susceptible ug/mL     Ampicillin ($)  Resistant ug/mL     Ampicillin/sulbactam ($) <=2 Susceptible ug/mL     Cefazolin ($) <=4 Susceptible ug/mL     Ceftazidime ($) <=1 Susceptible ug/mL     Ceftriaxone ($) <=1 Susceptible ug/mL     Cefepime ($$) <=1 Susceptible ug/mL     Ciprofloxacin ($) <=0.25 Susceptible ug/mL     Gentamicin ($) <=1 Susceptible ug/mL     Levofloxacin ($) <=0.12 Susceptible ug/mL     Meropenem ($$) <=0.25 Susceptible ug/mL     Piperacillin/Tazobac ($) <=4 Susceptible ug/mL     Tobramycin ($) <=1 Susceptible ug/mL     Trimeth/Sulfa <=20 Susceptible ug/mL     Cefoxitin <=4 Susceptible ug/mL   URINE CULTURE HOLD SAMPLE    Collection Time: 02/12/22  6:36 PM    Specimen: Serum; Urine   Result Value Ref Range    Urine culture hold        Urine on hold in Microbiology dept for 2 days. If unpreserved urine is submitted, it cannot be used for addtional testing after 24 hours, recollection will be required.    URINALYSIS W/MICROSCOPIC    Collection Time: 02/12/22  6:36 PM   Result Value Ref Range    Color DARK YELLOW      Appearance CLEAR CLEAR      Specific gravity 1.021 1.003 - 1.030      pH (UA) 5.5 5.0 - 8.0      Protein 30 (A) NEG mg/dL    Glucose Negative NEG mg/dL    Ketone TRACE (A) NEG mg/dL    Bilirubin Negative NEG      Blood MODERATE (A) NEG      Urobilinogen 0.2 0.2 - 1.0 EU/dL    Nitrites Negative NEG      Leukocyte Esterase SMALL (A) NEG      WBC 5-10 0 - 4 /hpf    RBC 5-10 0 - 5 /hpf    Epithelial cells FEW FEW /lpf    Bacteria Negative NEG /hpf    Yeast PRESENT (A) NEG      Budding yeast PRESENT (A) NEG      Yeast w/hyphae PRESENT (A) NEG     METABOLIC PANEL, COMPREHENSIVE    Collection Time: 02/13/22  1:32 AM   Result Value Ref Range    Sodium 141 136 - 145 mmol/L    Potassium 3.8 3.5 - 5.1 mmol/L    Chloride 111 (H) 97 - 108 mmol/L    CO2 24 21 - 32 mmol/L    Anion gap 6 5 - 15 mmol/L    Glucose 150 (H) 65 - 100 mg/dL    BUN 18 6 - 20 MG/DL    Creatinine 0.54 (L) 0.70 - 1.30 MG/DL    BUN/Creatinine ratio 33 (H) 12 - 20      GFR est AA >60 >60 ml/min/1.73m2    GFR est non-AA >60 >60 ml/min/1.73m2    Calcium 8.9 8.5 - 10.1 MG/DL    Bilirubin, total 1.1 (H) 0.2 - 1.0 MG/DL    ALT (SGPT) 64 12 - 78 U/L    AST (SGOT) 43 (H) 15 - 37 U/L    Alk. phosphatase 69 45 - 117 U/L    Protein, total 7.1 6.4 - 8.2 g/dL    Albumin 3.0 (L) 3.5 - 5.0 g/dL    Globulin 4.1 (H) 2.0 - 4.0 g/dL    A-G Ratio 0.7 (L) 1.1 - 2.2     CBC WITH AUTOMATED DIFF    Collection Time: 02/13/22  1:32 AM   Result Value Ref Range    WBC 11.5 (H) 4.1 - 11.1 K/uL    RBC 4.87 4.10 - 5.70 M/uL    HGB 15.7 12.1 - 17.0 g/dL    HCT 46.2 36.6 - 50.3 %    MCV 94.9 80.0 - 99.0 FL    MCH 32.2 26.0 - 34.0 PG    MCHC 34.0 30.0 - 36.5 g/dL    RDW 13.6 11.5 - 14.5 %    PLATELET 494 962 - 373 K/uL    MPV 9.6 8.9 - 12.9 FL    NRBC 0.0 0  WBC    ABSOLUTE NRBC 0.00 0.00 - 0.01 K/uL    NEUTROPHILS 69 32 - 75 %    BAND NEUTROPHILS 14 (H) 0 - 6 %    LYMPHOCYTES 8 (L) 12 - 49 %    MONOCYTES 7 5 - 13 %    EOSINOPHILS 0 0 - 7 %    BASOPHILS 0 0 - 1 %    METAMYELOCYTES 2 (H) 0 %    IMMATURE GRANULOCYTES 0 %    ABS. NEUTROPHILS 9.5 (H) 1.8 - 8.0 K/UL    ABS. LYMPHOCYTES 0.9 0.8 - 3.5 K/UL    ABS. MONOCYTES 0.8 0.0 - 1.0 K/UL    ABS.  EOSINOPHILS 0.0 0.0 - 0.4 K/UL ABS. BASOPHILS 0.0 0.0 - 0.1 K/UL    ABS. IMM. GRANS. 0.0 K/UL    DF MANUAL      RBC COMMENTS ANISOCYTOSIS  1+        RBC COMMENTS ATYPICAL LYMPHOCYTES PRESENT           XR Results (maximum last 3): Results from [place] encounter on 02/12/22    XR ABD (KUB)    Impression  Mild persistent bowel distention. CT Results (maximum last 3): Results from [place] encounter on 02/12/22    CT ABD PELV WO CONT    Impression  1. Urinary bladder outlet obstruction is suggested as detailed. 2. Small bowel obstruction and etiology not identified. MRI Results (maximum last 3): No results found for this or any previous visit. Nuclear Medicine Results (maximum last 3): No results found for this or any previous visit. US Results (maximum last 3): No results found for this or any previous visit. Active Problems:    Memory impairment (1/9/2019)      Pneumonia (2/12/2022)      Acute hypoxemic respiratory failure ([address] (2/12/2022)      Other partial intestinal obstruction ([address] (2/14/2022)          ASSESSMENT/PLAN  Suspect this was more of an ileus pattern rather than true partial mechanical obstruction but seems to be improved, have advance to full liquid diet and [employer] and from here can advance as tolerated, discharge issues per hospitalist who will be watching for the pneumonia issues but from our standpoint he can be discharged when tolerating diet.       FACE TO FACE time including review of any indicated imaging, discussion with patient, and other providers, exam and discussion with patient:   23        minutes    END:

## 2022-02-14 NOTE — PROGRESS NOTES
Physician Progress Note      PATIENT:               Williams Silverman  CSN #:                  [id number]  :                       1935  ADMIT DATE:       2022 6:32 AM  DISCH DATE:  RESPONDING  PROVIDER #:        Roni Schmidt MD          QUERY TEXT:    Pt admitted with pneumonia. Pt. noted to have anklylosing spondylitis and severe RA. If possible, please document in the progress notes and/or discharge summary if you are treating and/or evaluating any of the following: The medical record reflects the following:  Risk Factors: RA; ankylosing spondylitis    Clinical Indicators:    ED PN-  He is bedbound at baseline    H&P-  ankylosing spondylitis, advanced to the RA with spasticity deformities of extremities more pronounced with lower extremities and is wheelchair-bound  Extremities:    Both lower extremities are deformed and spastic at all joints. Upper extremity hand joints are deformed but not as bad as the lower extremities. Nursing Flowsheet-  activity assistance- complete care  hygiene assistance- complete    Treatment: total nursing care    Functional quadriplegia (or quadriparesis) is defined as the complete inability to move due to severe disability or frailty caused by another medical condition without physical injury or damage to the brain or spinal cord. Source: https://acphospitalist.org    Thank you,  Elodia Jose RN, BSN, [facility], [place], [place]  Clinical Documentation  [phone] or [phone]  Options provided:  -- Functional quadriplegia  -- Complete immobility due to severe physical disability or frailty  -- Severe debility/impaired mobility  -- Other - I will add my own diagnosis  -- Disagree - Not applicable / Not valid  -- Disagree - Clinically unable to determine / Unknown  -- Refer to Clinical Documentation Reviewer    PROVIDER RESPONSE TEXT:    This patient has functional quadriplegia.     Query created by: Dewey Brennan on 2022 2:04 PM      Electronically signed by:   Felicia Muller MD 2/14/2022 4:44 PM

## 2022-02-14 NOTE — PROGRESS NOTES
SLP Contact note    Update: Discussed with Dr. Yessenia Washington. Pt NPO for a SBO. No SLP needs at this time. Orders received per protocol and therapist completed the orders. SLP is not a per protocol service. If skilled SLP services are indicated, please have physician place orders or consult orders as verbal with read back or telephone with read back. However, pt passed swallow screen and therefore could initiated. Otherwise, please re-place orders.       Thank you,  JAYSON Bassett.Ed, [id number] [facility]  Speech-Language Pathologist

## 2022-02-14 NOTE — PROGRESS NOTES
Problem: Pressure Injury - Risk of  Goal: *Prevention of pressure injury  Description: Document Nader Scale and appropriate interventions in the flowsheet.   Outcome: Progressing Towards Goal  Note: Pressure Injury Interventions:  Sensory Interventions: Float heels,Keep linens dry and wrinkle-free    Moisture Interventions: Apply protective barrier, creams and emollients,Internal/External urinary devices    Activity Interventions: Assess need for specialty bed    Mobility Interventions: Float heels,HOB 30 degrees or less    Nutrition Interventions: Document food/fluid/supplement intake

## 2022-02-14 NOTE — PROGRESS NOTES
Transition of Care Plan   RUR: 13%   Disposition: The disposition plan is return to [facility]    F/U with PCP/Specialist     Transport: wheel chair Will[facility]      Reason for Admission:  Pneumonia                      RUR Score:   13%                  Plan for utilizing home health:    None       PCP: First and Last name:  None     Name of Practice:    Are you a current patient: Yes/No:    Approximate date of last visit:    Can you participate in a virtual visit with your PCP:                     Current Advanced Directive/Advance Care Plan: DNR      Healthcare Decision Maker:                Health Care Agent: [place] Cabrera, - Other Relative - [phone]                  Transition of Care Plan:                        CRM spoke with Dusty Cabrera, introduced self, explained role, verified demographics, and offered assistance. The patient lives at [facility]. Patient is wheel chair bound and requires assistance with his ADL's/IADL's and he does have medical equipment. Care Management Interventions  PCP Verified by CM: Yes  Palliative Care Criteria Met (RRAT>21 & CHF Dx)?: No  Mode of Transport at Discharge: [address] (CM Consult): Discharge Planning  MyChart Signup: No  Discharge Durable Medical Equipment: No  Health Maintenance Reviewed: Yes  Physical Therapy Consult: No  Occupational Therapy Consult: No  Support Systems: Other Family Member(s)  [facility] Prov[facility] No  Discharge Location  Patient Expects to be Discharged to[de-identified] [address] Medicare pt has received and reviewed 1st IM letter informing them of their right to appeal the discharge.       NEGAR Roldan

## 2022-02-14 NOTE — PROGRESS NOTES
Bedside shift change report given to [employer] (oncoming nurse) by Cuate Alvarado (offgoing nurse). Report included the following information SBAR, Kardex, MAR and Cardiac Rhythm SR c 1 AVB.

## 2022-02-14 NOTE — PROGRESS NOTES
Progress Note   Jair Alan MD        PCP: None    Please note that this dictation was completed with [employer], the computer voice recognition software. Quite often unanticipated grammatical, syntax, homophones, and other interpretive errors are inadvertently transcribed by the computer software. Please disregard these errors. Please excuse any errors that have escaped final proofreading. Admission history   This is an 80year-old elderly gentleman with a PMH significant for ankylosing spondylitis, advanced to the RA with spasticity deformities of extremities more pronounced with lower extremities and is wheelchair-bound, chronic pain on chronic opioids presented to the ED for the above. Patient is alert and oriented. He says he has been coughing a lot since yesterday. He was reported to have nausea and vomiting and when asked him about swallowing he said he admitted he has some difficulty with swallowing. He denied fever or chills. He does not think he is vaccinated against COVID. Upon evaluation in the ED he was hypoxic with SPO2 of 86% and currently on 6 L/min with SPO2 95%  His blood work were grossly unremarkable. Rapid Covid test was negative. CXR showed right middle lobe pneumonia  She was started on ceftriaxone and Zithromax and consult for admission evaluation was requested. Subjectively  02/14/22  -No complaints. He is asking if he can get Coca [employer] or something to lift his spirit up. He is NPO. He has some pain in the suprapubic area,denies nausea,a vomitng          Assessment & Plan:  Bacterial pneumonia probably aspiration. Acute hypoxic respiratory failure due to pneumonia  --Continue with  Zosyn and Zithromax   --Rapid Covid test  And PCR. ,Sputum gram stain positive for gram negative,culture in process. --Supplemental oxygen to keep SPO2> 92%  --Bronchodilators as needed  --NPO      Small bowel obstruction   --CT a/p showed SBO. No more nausea, vomiting since admission,no abdomina distension. Abdominal exam benign. --Maintain NPO.KUB.  --No nausea, vomiting. Abdominal exam benign. --KUB pending,based on this  may start clears. Acute urinary retention,max had difficulty with caruso insertion due to his anatomy. Urology placed caruso and recommended to leave caruso in for at least 48 hours   --continue with Flomax and Proscar. Chronic RA and ankylosing spondylitis with significant spastic deformity of the extremities, more pronounced on the lower extremities. --No sign of active disease, so no indication for anti-inflammatories or steroids  --Patient is wheelchair-bound    Chronic pain, opioid dependent  --Continue MS Contin with as needed Percocet. --Antidote: Narcan            Patient's Baseline: Wheelchair-bound  DVT ppx: Lovenox  Code status: He comfort med DNR status and patient has a D -DNR on file signed by himself in 2018. Disposition: Back to long-term care facility once medically stable, he will probably need 3-5 days.     Surrogate/mPOAt: Yesy Ruiz,  Home Phone: [phone] updated on 2/12 and 21/3          Current Facility-Administered Medications   Medication Dose Route Frequency    tamsulosin (FLOMAX) capsule 0.4 mg  0.4 mg Oral DAILY    HYDROmorphone (DILAUDID) injection 0.2 mg  0.2 mg IntraVENous Q6H PRN    azithromycin (ZITHROMAX) 500 mg in 0.9% sodium chloride 250 mL (VIAL-MATE)  500 mg IntraVENous Q24H    sodium chloride (NS) flush 5-40 mL  5-40 mL IntraVENous Q8H    sodium chloride (NS) flush 5-40 mL  5-40 mL IntraVENous PRN    HYDROcodone-acetaminophen (NORCO) 5-325 mg per tablet 1 Tablet  1 Tablet Oral Q4H PRN    naloxone (NARCAN) injection 0.4 mg  0.4 mg IntraVENous PRN    enoxaparin (LOVENOX) injection 40 mg  40 mg SubCUTAneous Q24H    piperacillin-tazobactam (ZOSYN) 3.375 g in 0.9% sodium chloride (MBP/ADV) 100 mL MBP  3.375 g IntraVENous Q8H    bisacodyL (DULCOLAX) suppository 10 mg  10 mg Rectal DAILY PRN    DULoxetine (CYMBALTA) capsule 60 mg  60 mg Oral DAILY    finasteride (PROSCAR) tablet 5 mg  5 mg Oral DAILY    morphine CR (MS CONTIN) tablet 15 mg  15 mg Oral Q12H    senna-docusate (PERICOLACE) 8.6-50 mg per tablet 2 Tablet  2 Tablet Oral BID    albuterol-ipratropium (DUO-NEB) 2.5 MG-0.5 MG/3 ML  3 mL Nebulization Q6H PRN             PMH/PSH:  History reviewed. No pertinent past medical history. No past surgical history on file. Home meds:   Prior to Admission medications    Medication Sig Start Date End Date Taking? Authorizing Provider   menthol-zinc oxide (CALMOSEPTINE) 0.44-20.6 % oint Apply 1 g to affected area as needed (apply generous amount to affected areas) for up to 100 doses. 3/19/19   Yue Gonzalez NP   DULoxetine (CYMBALTA) 60 mg capsule Take 1 Cap by mouth daily. 3/4/19   Yue Gonzalez NP   morphine CR (MS CONTIN) 15 mg CR tablet Take 1 Tab by mouth every twelve (12) hours. Max Daily Amount: 30 mg. 3/30/19   Yue Gonzalez NP   naloxone [facility]) 4 mg/actuation nasal spray Use 1 spray intranasally, then discard. Repeat with new spray every 2 min as needed for opioid overdose symptoms, alternating nostrils. 2/4/19   Yue Gonzalez NP   finasteride (PROSCAR) 5 mg tablet Take 1 Tab by mouth daily. 12/13/18   Yue Gonzalez NP   senna-docusate (PERICOLACE) 8.6-50 mg per tablet Take 2 Tabs by mouth two (2) times a day. 12/13/18   Yue Gonzalez NP   cyclobenzaprine (FLEXERIL) 5 mg tablet Take 1 Tab by mouth three (3) times daily as needed for Muscle Spasm(s). 12/13/18   Yue Gonzalez NP   polyethylene glycol (MIRALAX) 17 gram/dose powder Take 17 g by mouth daily. Provider, Historical   bisacodyl (DULCOLAX, BISACODYL,) 10 mg suppository Insert 10 mg into rectum daily as needed (for constipation). Provider, Historical       Allergies:  No Known Allergies    FH:  History reviewed. No pertinent family history.     SH:  Social History     Tobacco Use    Smoking status: Unknown If Ever Smoked    Smokeless tobacco: Never Used   Substance Use Topics    Alcohol use: No       ROS: A comprehensive review of systems was negative except for that written in the HPI. PHYSICAL EXAM:    General:           Resting quietly   HEENT:           No pallor,jaundice. Nasal cannula present    Neck:               Supple, symmetrical,  thyroid: non tender  Lungs: On oxygen via nasal cannula, breathing is not labored. Crepitations on the right lung posteriorly. Chest wall:      No tenderness  No Accessory muscle use. Heart:              Regular  rhythm,  No  murmur   No edema  Abdomen:      Soft, non-tender. Not distended. Bowel sounds normal    Bowen in place,draining clear yellow urine. Extremities:    Both lower extremities are deformed and spastic at all joints. Upper extremity hand joints are deformed but not as bad as the lower extremities. Neurologic:   Patient is grossly alert and oriented for place, person and time. Labs/Imaging:  Available labs and imaging studies reviewed.         Signed By: Zeke Montejo MD     February 14, 2022

## 2022-02-14 NOTE — PROGRESS NOTES
Bedside shift change report given to dalila (oncoming nurse) by Rolando Rodriguez (offgoing nurse). Report included the following information SBAR, Kardex and Intake/Output.

## 2022-02-14 NOTE — PROGRESS NOTES
1700- Report obtained from [facility]. 1940- Report given to [facility].

## 2022-02-15 PROCEDURE — 99231 SBSQ HOSP IP/OBS SF/LOW 25: CPT

## 2022-02-15 PROCEDURE — 97530 THERAPEUTIC ACTIVITIES: CPT

## 2022-02-15 PROCEDURE — 74011250636 HC RX REV CODE- 250/636: Performed by: HOSPITALIST

## 2022-02-15 PROCEDURE — 74011000250 HC RX REV CODE- 250: Performed by: HOSPITALIST

## 2022-02-15 PROCEDURE — 97161 PT EVAL LOW COMPLEX 20 MIN: CPT

## 2022-02-15 PROCEDURE — 74011250637 HC RX REV CODE- 250/637: Performed by: HOSPITALIST

## 2022-02-15 PROCEDURE — 51798 US URINE CAPACITY MEASURE: CPT

## 2022-02-15 PROCEDURE — 74011000258 HC RX REV CODE- 258: Performed by: HOSPITALIST

## 2022-02-15 PROCEDURE — 65660000000 HC RM CCU STEPDOWN

## 2022-02-15 RX ADMIN — SENNOSIDES AND DOCUSATE SODIUM 2 TABLET: 8.6; 5 TABLET ORAL at 08:48

## 2022-02-15 RX ADMIN — MORPHINE SULFATE 15 MG: 15 TABLET, FILM COATED, EXTENDED RELEASE ORAL at 20:49

## 2022-02-15 RX ADMIN — HYDROCODONE BITARTRATE AND ACETAMINOPHEN 1 TABLET: 5; 325 TABLET ORAL at 05:05

## 2022-02-15 RX ADMIN — SODIUM CHLORIDE, PRESERVATIVE FREE 10 ML: 5 INJECTION INTRAVENOUS at 05:05

## 2022-02-15 RX ADMIN — MORPHINE SULFATE 15 MG: 15 TABLET, FILM COATED, EXTENDED RELEASE ORAL at 08:48

## 2022-02-15 RX ADMIN — PIPERACILLIN AND TAZOBACTAM 3.38 G: 3; .375 INJECTION, POWDER, LYOPHILIZED, FOR SOLUTION INTRAVENOUS at 05:05

## 2022-02-15 RX ADMIN — FINASTERIDE 5 MG: 5 TABLET, FILM COATED ORAL at 08:48

## 2022-02-15 RX ADMIN — DULOXETINE HYDROCHLORIDE 60 MG: 60 CAPSULE, DELAYED RELEASE ORAL at 08:48

## 2022-02-15 RX ADMIN — TAMSULOSIN HYDROCHLORIDE 0.4 MG: 0.4 CAPSULE ORAL at 08:48

## 2022-02-15 RX ADMIN — ENOXAPARIN SODIUM 40 MG: 100 INJECTION SUBCUTANEOUS at 12:19

## 2022-02-15 RX ADMIN — SODIUM CHLORIDE, PRESERVATIVE FREE 10 ML: 5 INJECTION INTRAVENOUS at 13:34

## 2022-02-15 RX ADMIN — PIPERACILLIN AND TAZOBACTAM 3.38 G: 3; .375 INJECTION, POWDER, LYOPHILIZED, FOR SOLUTION INTRAVENOUS at 20:49

## 2022-02-15 RX ADMIN — PIPERACILLIN AND TAZOBACTAM 3.38 G: 3; .375 INJECTION, POWDER, LYOPHILIZED, FOR SOLUTION INTRAVENOUS at 12:19

## 2022-02-15 RX ADMIN — SENNOSIDES AND DOCUSATE SODIUM 2 TABLET: 8.6; 5 TABLET ORAL at 18:50

## 2022-02-15 NOTE — PROGRESS NOTES
PHYSICAL THERAPY EVALUATION/DISCHARGE  Patient: Luz Marina Woodward (78 y.o. male)  Date: 2/15/2022  Primary Diagnosis: Pneumonia [J18.9]  Acute hypoxemic respiratory failure (HCC) [J96.01]       Precautions:   Aspiration,Fall,Skin, low BMI of 14.00      ASSESSMENT  Based on the objective data described below, the patient presents with bilateral LEs non functional strength and LE flexion contractures. He was admitted with PNA and acute respiratory failure. Sp02 was stable on 2 liters of 02. Pt is total care at baseline, staff utilizes a caterina lift for OOB to a wheelchair at the SNF where he resides. Pt is unable to reposition himself and given his BMI of 14.00 is at great risk for skin breakdown. He will benefit from diligent practice of scheduled repositioning and floating of his heels. Given that pt is total care, PT will sign off. Visit Vitals  BP (!) 155/71 (BP 1 Location: Left upper arm, BP Patient Position: Semi fowlers)   Pulse 60   Temp 98.1 °F (36.7 °C)   Resp 18   Ht 6' 1\" (1.854 m)   Wt 48.1 kg (106 lb 1.6 oz)   SpO2 on 2 liters of 02 94%   BMI 14.00 kg/m²       Functional Outcome Measure: The patient scored 0/28 on the Tinetti outcome measure which is indicative of high fall risk. .      Other factors to consider for discharge:  Chronic RA and ankylosing spondylitis with significant spastic deformity of the extremities, more pronounced on the lower extremities (contractures), acute urinary retention, SBO (resolved), low BMI of 14.00     Further skilled acute physical therapy is not indicated at this time. PLAN :  Recommendation for discharge: (in order for the patient to meet his/her long term goals)  No skilled physical therapy/ follow up rehabilitation needs identified at this time.     This discharge recommendation:  A follow-up discussion with the attending provider and/or case management is planned    IF patient discharges home will need the following DME: patient owns DME required for discharge SUBJECTIVE:   Patient stated I haven't walked since I was 8years old.     OBJECTIVE DATA SUMMARY:   HISTORY:    History reviewed. No pertinent past medical history. No past surgical history on file. Consult received, chart reviewed, pt cleared by nursing  Prior level of function: wheelchair bound and caterina lift for OOB  Personal factors and/or comorbidities impacting plan of care: Chronic RA and ankylosing spondylitis with significant spastic deformity of the extremities, more pronounced on the lower extremities (contractures), acute urinary retention, SBO (resolved), low BMI of 14.00    Home Situation  Home Environment: [address] [place] [address] Name: [id number]:  (staff)  Patient Expects to be Discharged to[de-identified] [facility]  Current DME Used/Available at Home: Wheelchair (caterina lift)    EXAMINATION/PRESENTATION/DECISION MAKING:   Critical Behavior:  Neurologic State: Alert,Confused  Orientation Level: Oriented to person (partially oriented to place and situation)  Cognition: Decreased attention/concentration,Impaired decision making,Memory loss,Poor safety awareness     Hearing:     Skin:  Refer to MD and nursing notes  Edema: none noted  Range Of Motion:  AROM: (P) Grossly decreased, non-functional LEs           PROM: (P) Grossly decreased, non-functional flexion contractures bilateral LEs hip and knees  R > L as well as contractures in feet           Strength:                 decreased nonfunctional LEs        Tone & Sensation:                                  Coordination:  Coordination: (P) Grossly decreased, non-functional LEs  Vision:      Functional Mobility:  Bed Mobility:  Rolling: (P) Maximum assistance           Transfers:                             Balance:   Sitting: (P) Impaired (inferred)  Ambulation/Gait Training:                                                         Stairs:               Therapeutic Exercises:       Functional Measure:  Tinetti test:    Sitting Balance: 0  Arises: 0  Attempts to Rise: 0  Immediate Standing Balance: 0  Standing Balance: 0  Nudged: 0  Eyes Closed: 0  Turn 360 Degrees - Continuous/Discontinuous: 0  Turn 360 Degrees - Steady/Unsteady: 0  Sitting Down: 0  Balance Score: 0 Balance total score  Indication of Gait: 0  R Step Length/Height: 0  L Step Length/Height: 0  R Foot Clearance: 0  L Foot Clearance: 0  Step Symmetry: 0  Step Continuity: 0  Path: 0  Trunk: 0  Walking Time: 0  Gait Score: 0 Gait total score  Total Score: 0/28 Overall total score         Tinetti Tool Score Risk of Falls  <19 = High Fall Risk  19-24 = Moderate Fall Risk  25-28 = Low Fall Risk  Tinetti ME. Performance-Oriented Assessment of Mobility Problems in Elderly Patients. [facility] 66; [id number].  (Scoring Description: PT Bulletin Feb. 10, 1993)    Older adults: Angi Jha et al, 2009; n = 1000 [facility] elderly evaluated with ABC, NEAL, ADL, and IADL)  · Mean NEAL score for males aged 69-68 years = 26.21(3.40)  · Mean NEAL score for females age 69-68 years = 25.16(4.30)  · Mean NEAL score for males over 80 years = 23.29(6.02)  · Mean NEAL score for females over 80 years = 17.20(8.32)          Physical Therapy Evaluation Charge Determination   History Examination Presentation Decision-Making   HIGH Complexity :3+ comorbidities / personal factors will impact the outcome/ POC  MEDIUM Complexity : 3 Standardized tests and measures addressing body structure, function, activity limitation and / or participation in recreation  LOW Complexity : Stable, uncomplicated  LOW Complexity : FOTO score of       Based on the above components, the patient evaluation is determined to be of the following complexity level: LOW     Pain Rating:  None rated    Activity Tolerance:   Fair and at bed level      After treatment patient left in no apparent distress:   Supine in bed, Heels elevated for pressure relief, Patient positioned in slight right sidelying for pressure relief, Call bell within reach and Side rails x 3    COMMUNICATION/EDUCATION:   The patients plan of care was discussed with: Registered nurse. Fall prevention education was provided and the patient/caregiver indicated understanding.     Thank you for this referral.  Owen Way   Time Calculation: 28 mins

## 2022-02-15 NOTE — PROGRESS NOTES
General Surgery Daily Progress Note    Admit Date: 2022  Post-Operative Day: * No surgery found * from * No surgery found *     Subjective:     Last 24 hrs: patient is doing well. No c/o pain, +flatus, Nurse reported incontinent BM this AM. Tolerating full liquid diet. 22 AXR  IMPRESSION  Mild persistent bowel distention    Objective:     Blood pressure (!) 165/68, pulse (!) 43, temperature 97.7 °F (36.5 °C), resp. rate 18, height 6' 1\" (1.854 m), weight 106 lb 1.6 oz (48.1 kg), SpO2 95 %. Temp (24hrs), Av.7 °F (36.5 °C), Min:97.6 °F (36.4 °C), Max:97.8 °F (36.6 °C)      _____________________  Physical Exam:     Alert, in no acute distress, seems to have questionable short term memory loss, oriented to self  Cardiovascular: RRR  Lungs: some scattered wheezing bilaterally, breathing is not labored   Abdomen:soft, no TTP, active BS      Assessment:   Active Problems:    Memory impairment (2019)      Pneumonia (2022)      Acute hypoxemic respiratory failure ([address] (2022)      Other partial intestinal obstruction ([address] (2022)            Plan:     Advance diet as tolerated   Patient can discharge from our standpoint when ready per hospitalist.  Will sign off  - please call if needed      Data Review:    Recent Labs     22   WBC 11.5*   HGB 15.7   HCT 46.2        Recent Labs     22      K 3.8   *   CO2 24   *   BUN 18   CREA 0.54*   CA 8.9   ALB 3.0*   ALT 64     No results for input(s): AML, LPSE in the last 72 hours.         ______________________  Medications:    Current Facility-Administered Medications   Medication Dose Route Frequency    tamsulosin (FLOMAX) capsule 0.4 mg  0.4 mg Oral DAILY    HYDROmorphone (DILAUDID) injection 0.2 mg  0.2 mg IntraVENous Q6H PRN    sodium chloride (NS) flush 5-40 mL  5-40 mL IntraVENous Q8H    sodium chloride (NS) flush 5-40 mL  5-40 mL IntraVENous PRN    HYDROcodone-acetaminophen (NORCO) 5-325 mg per tablet 1 Tablet  1 Tablet Oral Q4H PRN    naloxone (NARCAN) injection 0.4 mg  0.4 mg IntraVENous PRN    enoxaparin (LOVENOX) injection 40 mg  40 mg SubCUTAneous Q24H    piperacillin-tazobactam (ZOSYN) 3.375 g in 0.9% sodium chloride (MBP/ADV) 100 mL MBP  3.375 g IntraVENous Q8H    bisacodyL (DULCOLAX) suppository 10 mg  10 mg Rectal DAILY PRN    DULoxetine (CYMBALTA) capsule 60 mg  60 mg Oral DAILY    finasteride (PROSCAR) tablet 5 mg  5 mg Oral DAILY    morphine CR (MS CONTIN) tablet 15 mg  15 mg Oral Q12H    senna-docusate (PERICOLACE) 8.6-50 mg per tablet 2 Tablet  2 Tablet Oral BID    albuterol-ipratropium (DUO-NEB) 2.5 MG-0.5 MG/3 ML  3 mL Nebulization Q6H PRN       Evelyn Disla NP  2/15/2022      Pt seen and examined  Agree with above   On pureed diet tolerating  Ok to discharge home  From surgical perspective.  since he is tolerating

## 2022-02-15 NOTE — PROGRESS NOTES
TRANSITONS OF CARE:  RUR; 12%   Dispostion: Patient to return to long term care at [facility] when medically stable.

## 2022-02-15 NOTE — PROGRESS NOTES
Progress Note   Edward Bragg MD        PCP: None    Please note that this dictation was completed with [employer], the computer voice recognition software. Quite often unanticipated grammatical, syntax, homophones, and other interpretive errors are inadvertently transcribed by the computer software. Please disregard these errors. Please excuse any errors that have escaped final proofreading. Admission history   This is an 80year-old elderly gentleman with a PMH significant for ankylosing spondylitis, advanced to the RA with spasticity deformities of extremities more pronounced with lower extremities and is wheelchair-bound, chronic pain on chronic opioids presented to the ED for the above. Patient is alert and oriented. He says he has been coughing a lot since yesterday. He was reported to have nausea and vomiting and when asked him about swallowing he said he admitted he has some difficulty with swallowing. He denied fever or chills. He does not think he is vaccinated against COVID. Upon evaluation in the ED he was hypoxic with SPO2 of 86% and currently on 6 L/min with SPO2 95%  His blood work were grossly unremarkable. Rapid Covid test was negative. CXR showed right middle lobe pneumonia  She was started on ceftriaxone and Zithromax and consult for admission evaluation was requested. Subjectively  02/15/22  -Nurse helping him with breakfast.Nurse reported he had BM this morning. Advance diet, pureed(he does not have his denture with him)      Assessment & Plan:  Bacterial pneumonia probably aspiration. Sputum culture grew klebsiella   Acute hypoxic respiratory failure due to pneumonia  --Continue with  Zosyn. D/cd Zithromax. --Rapid Covid test  And PCR. ,Sputum gram stain positive for gram negative,culture in process.   --Supplemental oxygen to keep SPO2> 92%  --Bronchodilators as needed  -      Small bowel obstruction ,resolved  --CT a/p showed SBO,general surgery feels ileus than obstruciton. --Had bm today,2/15  -Advance diet,pureed     Acute urinary retention,max had difficulty with caruso insertion due to his anatomy. Urology placed caruso and recommended to leave caruso in for at least 48 hours,caruso placed on 2/12  --continue with Flomax and Proscar. --Voiding trial today,2/15    Chronic RA and ankylosing spondylitis with significant spastic deformity of the extremities, more pronounced on the lower extremities. --No sign of active disease, so no indication for anti-inflammatories or steroids  --Patient is wheelchair-bound    Chronic pain, opioid dependent  --Continue MS Contin ,prn percocet. --Bowel regimen   --Antidote: Narcan        Discharge planning:  --D/c caruso today. Advance diet. PT and OT ,for possible discharge tomorrow     Patient's Baseline: Wheelchair-bound  DVT ppx: Lovenox  Code status: He comfort med DNR status and patient has a D -DNR on file signed by himself in 2018. Disposition: Back to long-term care facility once medically stable, he will probably need 3-5 days.     Surrogate/mPOAt: Christoph Rothman,  Home Phone: [phone] updated on 2/12 and 21/3          Current Facility-Administered Medications   Medication Dose Route Frequency    tamsulosin (FLOMAX) capsule 0.4 mg  0.4 mg Oral DAILY    HYDROmorphone (DILAUDID) injection 0.2 mg  0.2 mg IntraVENous Q6H PRN    sodium chloride (NS) flush 5-40 mL  5-40 mL IntraVENous Q8H    sodium chloride (NS) flush 5-40 mL  5-40 mL IntraVENous PRN    HYDROcodone-acetaminophen (NORCO) 5-325 mg per tablet 1 Tablet  1 Tablet Oral Q4H PRN    naloxone (NARCAN) injection 0.4 mg  0.4 mg IntraVENous PRN    enoxaparin (LOVENOX) injection 40 mg  40 mg SubCUTAneous Q24H    piperacillin-tazobactam (ZOSYN) 3.375 g in 0.9% sodium chloride (MBP/ADV) 100 mL MBP  3.375 g IntraVENous Q8H    bisacodyL (DULCOLAX) suppository 10 mg  10 mg Rectal DAILY PRN    DULoxetine (CYMBALTA) capsule 60 mg  60 mg Oral DAILY    finasteride (PROSCAR) tablet 5 mg  5 mg Oral DAILY    morphine CR (MS CONTIN) tablet 15 mg  15 mg Oral Q12H    senna-docusate (PERICOLACE) 8.6-50 mg per tablet 2 Tablet  2 Tablet Oral BID    albuterol-ipratropium (DUO-NEB) 2.5 MG-0.5 MG/3 ML  3 mL Nebulization Q6H PRN             PMH/PSH:  History reviewed. No pertinent past medical history. No past surgical history on file. Home meds:   Prior to Admission medications    Medication Sig Start Date End Date Taking? Authorizing Provider   menthol-zinc oxide (CALMOSEPTINE) 0.44-20.6 % oint Apply 1 g to affected area as needed (apply generous amount to affected areas) for up to 100 doses. 3/19/19   Kajal Hernandez NP   DULoxetine (CYMBALTA) 60 mg capsule Take 1 Cap by mouth daily. 3/4/19   Kajal Hernandez NP   morphine CR (MS CONTIN) 15 mg CR tablet Take 1 Tab by mouth every twelve (12) hours. Max Daily Amount: 30 mg. 3/30/19   Kajal Hernandez NP   naloxone [facility]) 4 mg/actuation nasal spray Use 1 spray intranasally, then discard. Repeat with new spray every 2 min as needed for opioid overdose symptoms, alternating nostrils. 2/4/19   Kajal Hernandez NP   finasteride (PROSCAR) 5 mg tablet Take 1 Tab by mouth daily. 12/13/18   Kajal Hernandez NP   senna-docusate (PERICOLACE) 8.6-50 mg per tablet Take 2 Tabs by mouth two (2) times a day. 12/13/18   Kajal Hernandez NP   cyclobenzaprine (FLEXERIL) 5 mg tablet Take 1 Tab by mouth three (3) times daily as needed for Muscle Spasm(s). 12/13/18   Kajal Hernandez NP   polyethylene glycol (MIRALAX) 17 gram/dose powder Take 17 g by mouth daily. Provider, Historical   bisacodyl (DULCOLAX, BISACODYL,) 10 mg suppository Insert 10 mg into rectum daily as needed (for constipation). Provider, Historical       Allergies:  No Known Allergies    FH:  History reviewed. No pertinent family history.     SH:  Social History     Tobacco Use    Smoking status: Unknown If Ever Smoked    Smokeless tobacco: Never Used   Substance Use Topics    Alcohol use: No       ROS: A comprehensive review of systems was negative except for that written in the HPI. PHYSICAL EXAM:    General:           Resting quietly   HEENT:           No pallor,jaundice. Nasal cannula present    Neck:               Supple, symmetrical,  thyroid: non tender  Lungs: On oxygen via nasal cannula, breathing is not labored. Crepitations on the right lung posteriorly. Chest wall:      No tenderness  No Accessory muscle use. Heart:              Regular  rhythm,  No  murmur   No edema  Abdomen:      Soft, non-tender. Not distended. Bowel sounds normal    Bowen in place,draining clear slightly blood tinged urine. Extremities:    Both lower extremities are deformed and spastic at all joints. Upper extremity hand joints are deformed but not as bad as the lower extremities. Neurologic:   Patient is grossly alert and oriented for place, person and time. Labs/Imaging:  Available labs and imaging studies reviewed.         Signed By: Valerio Metz MD     February 15, 2022

## 2022-02-15 NOTE — PROGRESS NOTES
Bedside and Verbal shift change report given to GERARD Estrada (oncoming nurse) by Chichi Modi RN (offgoing nurse). Report included the following information SBAR, Kardex, Intake/Output, MAR and Recent Results.

## 2022-02-16 LAB
ANION GAP SERPL CALC-SCNC: 2 MMOL/L (ref 5–15)
ANION GAP SERPL CALC-SCNC: 4 MMOL/L (ref 5–15)
BASOPHILS # BLD: 0 K/UL (ref 0–0.1)
BASOPHILS NFR BLD: 0 % (ref 0–1)
BUN SERPL-MCNC: 6 MG/DL (ref 6–20)
BUN SERPL-MCNC: 8 MG/DL (ref 6–20)
BUN/CREAT SERPL: 19 (ref 12–20)
BUN/CREAT SERPL: 23 (ref 12–20)
CALCIUM SERPL-MCNC: 8.4 MG/DL (ref 8.5–10.1)
CALCIUM SERPL-MCNC: 8.5 MG/DL (ref 8.5–10.1)
CHLORIDE SERPL-SCNC: 108 MMOL/L (ref 97–108)
CHLORIDE SERPL-SCNC: 111 MMOL/L (ref 97–108)
CO2 SERPL-SCNC: 28 MMOL/L (ref 21–32)
CO2 SERPL-SCNC: 30 MMOL/L (ref 21–32)
CREAT SERPL-MCNC: 0.32 MG/DL (ref 0.7–1.3)
CREAT SERPL-MCNC: 0.35 MG/DL (ref 0.7–1.3)
DIFFERENTIAL METHOD BLD: ABNORMAL
EOSINOPHIL # BLD: 0 K/UL (ref 0–0.4)
EOSINOPHIL NFR BLD: 0 % (ref 0–7)
ERYTHROCYTE [DISTWIDTH] IN BLOOD BY AUTOMATED COUNT: 13.4 % (ref 11.5–14.5)
GLUCOSE SERPL-MCNC: 111 MG/DL (ref 65–100)
GLUCOSE SERPL-MCNC: 96 MG/DL (ref 65–100)
HCT VFR BLD AUTO: 37.2 % (ref 36.6–50.3)
HGB BLD-MCNC: 12.3 G/DL (ref 12.1–17)
IMM GRANULOCYTES # BLD AUTO: 0 K/UL
IMM GRANULOCYTES NFR BLD AUTO: 0 %
LYMPHOCYTES # BLD: 1 K/UL (ref 0.8–3.5)
LYMPHOCYTES NFR BLD: 18 % (ref 12–49)
MAGNESIUM SERPL-MCNC: 2.3 MG/DL (ref 1.6–2.4)
MCH RBC QN AUTO: 31.7 PG (ref 26–34)
MCHC RBC AUTO-ENTMCNC: 33.1 G/DL (ref 30–36.5)
MCV RBC AUTO: 95.9 FL (ref 80–99)
MONOCYTES # BLD: 0.4 K/UL (ref 0–1)
MONOCYTES NFR BLD: 7 % (ref 5–13)
NEUTS SEG # BLD: 4.3 K/UL (ref 1.8–8)
NEUTS SEG NFR BLD: 75 % (ref 32–75)
NRBC # BLD: 0 K/UL (ref 0–0.01)
NRBC BLD-RTO: 0 PER 100 WBC
PLATELET # BLD AUTO: 178 K/UL (ref 150–400)
PMV BLD AUTO: 9.8 FL (ref 8.9–12.9)
POTASSIUM SERPL-SCNC: 2.9 MMOL/L (ref 3.5–5.1)
POTASSIUM SERPL-SCNC: 4.6 MMOL/L (ref 3.5–5.1)
RBC # BLD AUTO: 3.88 M/UL (ref 4.1–5.7)
RBC MORPH BLD: ABNORMAL
SODIUM SERPL-SCNC: 140 MMOL/L (ref 136–145)
SODIUM SERPL-SCNC: 143 MMOL/L (ref 136–145)
WBC # BLD AUTO: 5.7 K/UL (ref 4.1–11.1)

## 2022-02-16 PROCEDURE — 80048 BASIC METABOLIC PNL TOTAL CA: CPT

## 2022-02-16 PROCEDURE — 83735 ASSAY OF MAGNESIUM: CPT

## 2022-02-16 PROCEDURE — 74011250636 HC RX REV CODE- 250/636: Performed by: HOSPITALIST

## 2022-02-16 PROCEDURE — 65660000000 HC RM CCU STEPDOWN

## 2022-02-16 PROCEDURE — 74011250637 HC RX REV CODE- 250/637: Performed by: HOSPITALIST

## 2022-02-16 PROCEDURE — 74011000250 HC RX REV CODE- 250: Performed by: HOSPITALIST

## 2022-02-16 PROCEDURE — 36415 COLL VENOUS BLD VENIPUNCTURE: CPT

## 2022-02-16 PROCEDURE — 85025 COMPLETE CBC W/AUTO DIFF WBC: CPT

## 2022-02-16 PROCEDURE — 74011000258 HC RX REV CODE- 258: Performed by: HOSPITALIST

## 2022-02-16 RX ORDER — POTASSIUM CHLORIDE 7.45 MG/ML
10 INJECTION INTRAVENOUS
Status: COMPLETED | OUTPATIENT
Start: 2022-02-16 | End: 2022-02-16

## 2022-02-16 RX ADMIN — PIPERACILLIN AND TAZOBACTAM 3.38 G: 3; .375 INJECTION, POWDER, LYOPHILIZED, FOR SOLUTION INTRAVENOUS at 12:00

## 2022-02-16 RX ADMIN — FINASTERIDE 5 MG: 5 TABLET, FILM COATED ORAL at 08:28

## 2022-02-16 RX ADMIN — DULOXETINE HYDROCHLORIDE 60 MG: 60 CAPSULE, DELAYED RELEASE ORAL at 08:28

## 2022-02-16 RX ADMIN — POTASSIUM CHLORIDE 10 MEQ: 7.46 INJECTION, SOLUTION INTRAVENOUS at 10:45

## 2022-02-16 RX ADMIN — POTASSIUM CHLORIDE 10 MEQ: 7.46 INJECTION, SOLUTION INTRAVENOUS at 09:50

## 2022-02-16 RX ADMIN — POTASSIUM CHLORIDE 10 MEQ: 7.46 INJECTION, SOLUTION INTRAVENOUS at 11:55

## 2022-02-16 RX ADMIN — ENOXAPARIN SODIUM 40 MG: 100 INJECTION SUBCUTANEOUS at 11:55

## 2022-02-16 RX ADMIN — TAMSULOSIN HYDROCHLORIDE 0.4 MG: 0.4 CAPSULE ORAL at 08:28

## 2022-02-16 RX ADMIN — SENNOSIDES AND DOCUSATE SODIUM 2 TABLET: 8.6; 5 TABLET ORAL at 17:05

## 2022-02-16 RX ADMIN — POTASSIUM CHLORIDE 10 MEQ: 7.46 INJECTION, SOLUTION INTRAVENOUS at 08:28

## 2022-02-16 RX ADMIN — SODIUM CHLORIDE, PRESERVATIVE FREE 10 ML: 5 INJECTION INTRAVENOUS at 13:00

## 2022-02-16 RX ADMIN — MORPHINE SULFATE 15 MG: 15 TABLET, FILM COATED, EXTENDED RELEASE ORAL at 08:28

## 2022-02-16 RX ADMIN — PIPERACILLIN AND TAZOBACTAM 3.38 G: 3; .375 INJECTION, POWDER, LYOPHILIZED, FOR SOLUTION INTRAVENOUS at 21:42

## 2022-02-16 RX ADMIN — SODIUM CHLORIDE, PRESERVATIVE FREE 10 ML: 5 INJECTION INTRAVENOUS at 21:42

## 2022-02-16 RX ADMIN — SODIUM CHLORIDE, PRESERVATIVE FREE 10 ML: 5 INJECTION INTRAVENOUS at 06:00

## 2022-02-16 RX ADMIN — SENNOSIDES AND DOCUSATE SODIUM 2 TABLET: 8.6; 5 TABLET ORAL at 08:28

## 2022-02-16 RX ADMIN — POTASSIUM BICARBONATE 40 MEQ: 782 TABLET, EFFERVESCENT ORAL at 08:28

## 2022-02-16 RX ADMIN — MORPHINE SULFATE 15 MG: 15 TABLET, FILM COATED, EXTENDED RELEASE ORAL at 21:42

## 2022-02-16 RX ADMIN — HYDROCODONE BITARTRATE AND ACETAMINOPHEN 1 TABLET: 5; 325 TABLET ORAL at 17:05

## 2022-02-16 RX ADMIN — PIPERACILLIN AND TAZOBACTAM 3.38 G: 3; .375 INJECTION, POWDER, LYOPHILIZED, FOR SOLUTION INTRAVENOUS at 06:00

## 2022-02-16 NOTE — PROGRESS NOTES
0630: Patient still unable to void. Bladder scanned. Attempted to straight cath x2 with coude was unsuccessful. Urology re-consulted.

## 2022-02-16 NOTE — PROGRESS NOTES
Transition of Care Plan   RUR- 13% Moderate Risk   DISPOSITION: The disposition plan is transition to a SNF - [facility] - patient accepted.  F/U with PCP/Specialist     Transport: [facility]     At 1:16pm - CM contacted Megan Perales at [facility] to follow up regarding patient coming back to facility. Glenda Manan reports that patient can come back to facility. CM to provide update if/once patient is cleared for discharge. [facility] will need to be scheduled on behalf of patient.     CASEY Chavarria, CRM, LMHP-e

## 2022-02-16 NOTE — PROGRESS NOTES
Progress Note   Curry Clark MD        PCP: None    Please note that this dictation was completed with [employer], the computer voice recognition software. Quite often unanticipated grammatical, syntax, homophones, and other interpretive errors are inadvertently transcribed by the computer software. Please disregard these errors. Please excuse any errors that have escaped final proofreading. Admission history   This is an 80year-old elderly gentleman with a PMH significant for ankylosing spondylitis, advanced to the RA with spasticity deformities of extremities more pronounced with lower extremities and is wheelchair-bound, chronic pain on chronic opioids presented to the ED for the above. Patient is alert and oriented. He says he has been coughing a lot since yesterday. He was reported to have nausea and vomiting and when asked him about swallowing he said he admitted he has some difficulty with swallowing. He denied fever or chills. He does not think he is vaccinated against COVID. Upon evaluation in the ED he was hypoxic with SPO2 of 86% and currently on 6 L/min with SPO2 95%  His blood work were grossly unremarkable. Rapid Covid test was negative. CXR showed right middle lobe pneumonia  She was started on ceftriaxone and Zithromax and consult for admission evaluation was requested. Subjectively  02/16/22  -He says my mouth is dry. RN in the room administering am meds. He failed voiding trial.He was difficult stick and caruso was placed by urology. Urology consulted for caruso reinsertion. I told him he will be discharged with crauso catheter     Assessment & Plan:  Bacterial pneumonia probably aspiration. Sputum culture grew klebsiella   Acute hypoxic respiratory failure due to pneumonia  --Continue with  Zosyn. D/cd Zithromax. --Rapid Covid test  And PCR. ,Sputum gram stain positive for gram negative,culture in process.   --Supplemental oxygen to keep SPO2> 92%  --Bronchodilators as needed      Hypokalemia  --replace with IV and oral potassium. BMP in the PM      Small bowel obstruction ,resolved  --CT a/p showed SBO,general surgery feels ileus than obstruciton. --Had bm ,2/15  -Advance diet,pureed ,toleraitng. Acute urinary retention,max had difficulty with caruso insertion due to his anatomy. Urology placed caruso and recommended to leave caruso in for at least 48 hours,caruso placed on 2/12  --continue with Flomax and Proscar. --Voiding trial today,2/15,failed. Urology asked to caruso with caruso reinsertion. Chronic RA and ankylosing spondylitis with significant spastic deformity of the extremities, more pronounced on the lower extremities. --No sign of active disease, so no indication for anti-inflammatories or steroids  --Patient is wheelchair-bound    Chronic pain, opioid dependent  --Continue MS Contin ,prn percocet. --Bowel regimen   --Antidote: Narcan        Discharge planning:  --Pending caruso reinsertion,BMP in the afternoon. Discussed with patient and CM he could possibly be d/c later today    Patient's Baseline: Wheelchair-bound  DVT ppx: Lovenox  Code status: He comfort med DNR status and patient has a D -DNR on file signed by himself in 2018. Disposition: Back to long-term care facility once medically stable, he will probably need 3-5 days.     Surrogate/mPOAt: Stephane Fraser,  Home Phone: [phone] updated on 2/12 and 21/3          Current Facility-Administered Medications   Medication Dose Route Frequency    potassium chloride 10 mEq in 100 ml IVPB  10 mEq IntraVENous Q1H    potassium bicarb-citric acid (EFFER-K) tablet 40 mEq  40 mEq Oral DAILY    tamsulosin (FLOMAX) capsule 0.4 mg  0.4 mg Oral DAILY    HYDROmorphone (DILAUDID) injection 0.2 mg  0.2 mg IntraVENous Q6H PRN    sodium chloride (NS) flush 5-40 mL  5-40 mL IntraVENous Q8H    sodium chloride (NS) flush 5-40 mL  5-40 mL IntraVENous PRN    HYDROcodone-acetaminophen (NORCO) 5-325 mg per tablet 1 Tablet 1 Tablet Oral Q4H PRN    naloxone (NARCAN) injection 0.4 mg  0.4 mg IntraVENous PRN    enoxaparin (LOVENOX) injection 40 mg  40 mg SubCUTAneous Q24H    piperacillin-tazobactam (ZOSYN) 3.375 g in 0.9% sodium chloride (MBP/ADV) 100 mL MBP  3.375 g IntraVENous Q8H    bisacodyL (DULCOLAX) suppository 10 mg  10 mg Rectal DAILY PRN    DULoxetine (CYMBALTA) capsule 60 mg  60 mg Oral DAILY    finasteride (PROSCAR) tablet 5 mg  5 mg Oral DAILY    morphine CR (MS CONTIN) tablet 15 mg  15 mg Oral Q12H    senna-docusate (PERICOLACE) 8.6-50 mg per tablet 2 Tablet  2 Tablet Oral BID    albuterol-ipratropium (DUO-NEB) 2.5 MG-0.5 MG/3 ML  3 mL Nebulization Q6H PRN             PMH/PSH:  History reviewed. No pertinent past medical history. No past surgical history on file. Home meds:   Prior to Admission medications    Medication Sig Start Date End Date Taking? Authorizing Provider   menthol-zinc oxide (CALMOSEPTINE) 0.44-20.6 % oint Apply 1 g to affected area as needed (apply generous amount to affected areas) for up to 100 doses. 3/19/19   Stacy Mendoza NP   DULoxetine (CYMBALTA) 60 mg capsule Take 1 Cap by mouth daily. 3/4/19   Stacy Mendoza NP   morphine CR (MS CONTIN) 15 mg CR tablet Take 1 Tab by mouth every twelve (12) hours. Max Daily Amount: 30 mg. 3/30/19   Stacy Mendoza NP   naloxone [facility]) 4 mg/actuation nasal spray Use 1 spray intranasally, then discard. Repeat with new spray every 2 min as needed for opioid overdose symptoms, alternating nostrils. 2/4/19   Stacy Mendoza NP   finasteride (PROSCAR) 5 mg tablet Take 1 Tab by mouth daily. 12/13/18   Stacy Mendoza NP   senna-docusate (PERICOLACE) 8.6-50 mg per tablet Take 2 Tabs by mouth two (2) times a day. 12/13/18   Stacy Mendoza NP   cyclobenzaprine (FLEXERIL) 5 mg tablet Take 1 Tab by mouth three (3) times daily as needed for Muscle Spasm(s).  12/13/18   Stacy Mendoza NP   polyethylene glycol (MIRALAX) 17 gram/dose powder Take 17 g by mouth daily.    Provider, Historical   bisacodyl (DULCOLAX, BISACODYL,) 10 mg suppository Insert 10 mg into rectum daily as needed (for constipation). Provider, Historical       Allergies:  No Known Allergies    FH:  History reviewed. No pertinent family history. SH:  Social History     Tobacco Use    Smoking status: Unknown If Ever Smoked    Smokeless tobacco: Never Used   Substance Use Topics    Alcohol use: No       ROS: A comprehensive review of systems was negative except for that written in the HPI. PHYSICAL EXAM:    General:           Resting quietly   HEENT:           No pallor,jaundice. Nasal cannula present    Neck:               Supple, symmetrical,  thyroid: non tender  Lungs: On oxygen via nasal cannula, breathing is not labored. Crepitations on the right lung posteriorly. Chest wall:      No tenderness  No Accessory muscle use. Heart:              Regular  rhythm,  No  murmur   No edema  Abdomen:      Soft, non-tender. Not distended. Bowel sounds normal  Extremities:    Both lower extremities are deformed and spastic at all joints. Upper extremity hand joints are deformed but not as bad as the lower extremities. Neurologic:   Patient is grossly alert and oriented for place, person and time. Labs/Imaging:  Available labs and imaging studies reviewed.         Signed By: Lyn Wade MD     February 16, 2022

## 2022-02-16 NOTE — PROGRESS NOTES
Bedside shift change report given to freddy ignacio rn (oncoming nurse) by Nidhi Coreas (offgoing nurse). Report included the following information SBAR and Kardex.

## 2022-02-16 NOTE — PROGRESS NOTES
OT order received, chart reviewed, nurse consulted. Patient require assistance for all ADL at baseline. He has been able to feed self with appropriate positioning while in this facility. He uses a mechanical lift for transfers at baseline. No skilled OT services indicated. Completing order.    Martina Nguyen, OTR/L

## 2022-02-17 VITALS
TEMPERATURE: 98.4 F | RESPIRATION RATE: 18 BRPM | BODY MASS INDEX: 14.61 KG/M2 | SYSTOLIC BLOOD PRESSURE: 150 MMHG | OXYGEN SATURATION: 93 % | DIASTOLIC BLOOD PRESSURE: 73 MMHG | HEIGHT: 73 IN | WEIGHT: 110.23 LBS | HEART RATE: 78 BPM

## 2022-02-17 PROBLEM — J18.9 PNEUMONIA: Status: RESOLVED | Noted: 2022-02-12 | Resolved: 2022-02-17

## 2022-02-17 PROBLEM — R41.3 MEMORY IMPAIRMENT: Status: RESOLVED | Noted: 2019-01-09 | Resolved: 2022-02-17

## 2022-02-17 PROBLEM — J96.01 ACUTE HYPOXEMIC RESPIRATORY FAILURE (HCC): Status: RESOLVED | Noted: 2022-02-12 | Resolved: 2022-02-17

## 2022-02-17 LAB
BACTERIA SPEC CULT: NORMAL
SERVICE CMNT-IMP: NORMAL

## 2022-02-17 PROCEDURE — 74011250636 HC RX REV CODE- 250/636: Performed by: HOSPITALIST

## 2022-02-17 PROCEDURE — 74011250637 HC RX REV CODE- 250/637: Performed by: HOSPITALIST

## 2022-02-17 PROCEDURE — 74011000258 HC RX REV CODE- 258: Performed by: HOSPITALIST

## 2022-02-17 PROCEDURE — 94760 N-INVAS EAR/PLS OXIMETRY 1: CPT

## 2022-02-17 PROCEDURE — 77010033678 HC OXYGEN DAILY

## 2022-02-17 PROCEDURE — 74011000250 HC RX REV CODE- 250: Performed by: HOSPITALIST

## 2022-02-17 RX ORDER — AMOXICILLIN AND CLAVULANATE POTASSIUM 875; 125 MG/1; MG/1
1 TABLET, FILM COATED ORAL EVERY 12 HOURS
Qty: 10 TABLET | Refills: 0 | Status: SHIPPED | OUTPATIENT
Start: 2022-02-17 | End: 2022-02-22

## 2022-02-17 RX ADMIN — MORPHINE SULFATE 15 MG: 15 TABLET, FILM COATED, EXTENDED RELEASE ORAL at 11:40

## 2022-02-17 RX ADMIN — PIPERACILLIN AND TAZOBACTAM 3.38 G: 3; .375 INJECTION, POWDER, LYOPHILIZED, FOR SOLUTION INTRAVENOUS at 04:59

## 2022-02-17 RX ADMIN — SODIUM CHLORIDE, PRESERVATIVE FREE 10 ML: 5 INJECTION INTRAVENOUS at 05:02

## 2022-02-17 NOTE — DISCHARGE INSTRUCTIONS
Discharge Instructions       PATIENT ID: Danilo Mejias  MRN: [id number]   YOB: 1935    DATE OF ADMISSION: 2/12/2022  6:32 AM    DATE OF DISCHARGE: 2/17/2022    PRIMARY CARE PROVIDER: None     ATTENDING PHYSICIAN: Breanna Arambula MD  DISCHARGING PROVIDER: Zaid Jenkins MD    To contact this individual call [phone] and ask the  to page. If unavailable ask to be transferred the Adult Hospitalist Department. DISCHARGE DIAGNOSES PNA     CONSULTATIONS: IP CONSULT TO HOSPITALIST  IP CONSULT TO UROLOGY  IP CONSULT TO GENERAL SURGERY    PROCEDURES/SURGERIES: * No surgery found *    PENDING TEST RESULTS:   At the time of discharge the following test results are still pending:     FOLLOW UP APPOINTMENTS:   Follow-up Information     Follow up With Specialties Details Why Contact Info       PCP f/u in 1 week            ADDITIONAL CARE RECOMMENDATIONS:     DIET: Cardiac Diet    ACTIVITY: Activity as tolerated    WOUND CARE:     EQUIPMENT needed:       Radiology      DISCHARGE MEDICATIONS:   See Medication Reconciliation Form    · It is important that you take the medication exactly as they are prescribed. · Keep your medication in the bottles provided by the pharmacist and keep a list of the medication names, dosages, and times to be taken in your wallet. · Do not take other medications without consulting your doctor. NOTIFY YOUR PHYSICIAN FOR ANY OF THE FOLLOWING:   Fever over 101 degrees for 24 hours. Chest pain, shortness of breath, fever, chills, nausea, vomiting, diarrhea, change in mentation, falling, weakness, bleeding. Severe pain or pain not relieved by medications. Or, any other signs or symptoms that you may have questions about.       DISPOSITION:   x Home With:   OT  PT  HH  RN       SNF/Inpatient Rehab/LTAC    Independent/assisted living    Hospice    Other:     CDMP Checked:   Yes x     PROBLEM LIST Updated:  Yes x       Signed:   Zaid Jenkins MD  2/17/2022  10:06 AM

## 2022-02-17 NOTE — PROGRESS NOTES
1130. Rn at bedside removing both PIV's and cardiac monitor from patient pending discharge. Pain medication administered     1138: [facility] at bedside to  patient to bring to [facility]. Report given to [facility]. Durable DNR printed along with discharge paper and MAR to get to [facility] for transport. 1150: Mikayla Barry called at [facility] and gave report on patient.

## 2022-02-17 NOTE — PROGRESS NOTES
Transition of Care Plan  · RUR- 13% Moderate Risk  · DISPOSITION: The disposition plan is transition to a SNF - [facility] - patient accepted. · F/U with PCP/Specialist    · Transport: [facility] -  [phone] -  at 12:00noon    Per attending patient is medically stable for discharge. Patient to transition back to [facility] SNF/LTC. At 10:27am - CM contacted admin coordinator at [address] [place] [address] to follow up regarding patient coming back to facility today. CM left a VM, awaiting call back. At 10:34am -  contacted admin coordinator Nohelia Ceron admin coordinator at [facility] to provide update. Admin coordinator confirms that patient can transition back to facility. Room Number: 101  Call to report number: [phone]  [facility]  time: 12noon    AVS has been updated. [facility] packet has been placed at bedside chart. CM provided update to patient and patients assigned nurse. Medicare pt has received, reviewed, and signed 2nd IM letter informing them of their right to appeal the discharge. Signed copy has been placed on pt bedside chart.     Herve Garg, MSW, CRM, LMHP-e

## 2022-02-17 NOTE — DISCHARGE SUMMARY
Discharge Summary       PATIENT ID: Kay Reeder  MRN: [id number]   YOB: 1935    DATE OF ADMISSION: 2/12/2022  6:32 AM    DATE OF DISCHARGE: 2/17/22   PRIMARY CARE PROVIDER: None     ATTENDING PHYSICIAN: Moises Loera  DISCHARGING PROVIDER: Crow Sauer MD    To contact this individual call [phone] and ask the  to page. If unavailable ask to be transferred the Adult Hospitalist Department. CONSULTATIONS: IP CONSULT TO HOSPITALIST  IP CONSULT TO UROLOGY  IP CONSULT TO GENERAL SURGERY    PROCEDURES/SURGERIES: * No surgery found *    DISCHARGE DIAGNOSES:  PNA    This is an 29-year-old elderly gentleman with a PMH significant for ankylosing spondylitis, advanced to the RA with spasticity deformities of extremities more pronounced with lower extremities and is wheelchair-bound, chronic pain on chronic opioids presented to the ED for the above. Patient is alert and oriented. He says he has been coughing a lot since yesterday. He was reported to have nausea and vomiting and when asked him about swallowing he said he admitted he has some difficulty with swallowing. He denied fever or chills. He does not think he is vaccinated against COVID. Upon evaluation in the ED he was hypoxic with SPO2 of 86% and currently on 6 L/min with SPO2 95%  His blood work were grossly unremarkable. Rapid Covid test was negative. CXR showed right middle lobe pneumonia    ADMISSION SUMMARY AND HOSPITAL COURSE:   Bacterial pneumonia probably aspiration. Sputum culture grew klebsiella   Acute hypoxic respiratory failure  Treated with abx d/c on Augmentin per c/s  Resolved SBO and he had retention of urine  And now resolved cont  Home meds     DISCHARGE DIAGNOSES / PLAN:      D/c to [facility]    BMI: Body mass index is 14.54 kg/m². . This patient: Meets criteria for underweight given BMI </= 18.5. The patient should be followed closely for risk of malnutrition.      PENDING TEST RESULTS:   At the time of discharge the following test results are still pending:      ADDITIONAL CARE RECOMMENDATIONS:        NOTIFY YOUR PHYSICIAN FOR ANY OF THE FOLLOWING:   Fever over 101 degrees for 24 hours. Chest pain, shortness of breath, fever, chills, nausea, vomiting, diarrhea, change in mentation, falling, weakness, bleeding. Severe pain or pain not relieved by medications, as well as any other signs or symptoms that you may have questions about. FOLLOW UP APPOINTMENTS:    Follow-up Information     Follow up With Specialties Details Why Contact Info       PCP f/u in 1 week              DIET: Cardiac Diet    ACTIVITY: Activity as tolerated    EQUIPMENT needed:     DISCHARGE MEDICATIONS:  Current Discharge Medication List      START taking these medications    Details   amoxicillin-clavulanate (Augmentin) 875-125 mg per tablet Take 1 Tablet by mouth every twelve (12) hours for 5 days. Qty: 10 Tablet, Refills: 0  Start date: 2/17/2022, End date: 2/22/2022         CONTINUE these medications which have NOT CHANGED    Details   menthol-zinc oxide (CALMOSEPTINE) 0.44-20.6 % oint Apply 1 g to affected area as needed (apply generous amount to affected areas) for up to 100 doses. Qty: 1 Tube, Refills: 2      DULoxetine (CYMBALTA) 60 mg capsule Take 1 Cap by mouth daily. Qty: 90 Cap, Refills: 3      morphine CR (MS CONTIN) 15 mg CR tablet Take 1 Tab by mouth every twelve (12) hours. Max Daily Amount: 30 mg.  Qty: 30 Tab, Refills: 0    Associated Diagnoses: Rheumatoid arthritis of multiple sites with negative rheumatoid factor (HCC)      naloxone (NARCAN) 4 mg/actuation nasal spray Use 1 spray intranasally, then discard. Repeat with new spray every 2 min as needed for opioid overdose symptoms, alternating nostrils. Qty: 1 Each, Refills: 1      finasteride (PROSCAR) 5 mg tablet Take 1 Tab by mouth daily. Qty: 90 Tab, Refills: 3      senna-docusate (PERICOLACE) 8.6-50 mg per tablet Take 2 Tabs by mouth two (2) times a day.   Qty: 180 Tab, Refills: 3      cyclobenzaprine (FLEXERIL) 5 mg tablet Take 1 Tab by mouth three (3) times daily as needed for Muscle Spasm(s). Qty: 90 Tab, Refills: 0      polyethylene glycol (MIRALAX) 17 gram/dose powder Take 17 g by mouth daily. bisacodyl (DULCOLAX, BISACODYL,) 10 mg suppository Insert 10 mg into rectum daily as needed (for constipation). DISPOSITION:    Home With:   OT  PT  HH  RN      x Long term SNF/Inpatient Rehab    Independent/assisted living    Hospice    Other:       PATIENT CONDITION AT DISCHARGE:     Functional status    Poor    x Deconditioned     Independent      Cognition   x  Lucid     Forgetful     Dementia      Catheters/lines (plus indication)    Bowen     PICC     PEG    x None      Code status     Full code    x DNR      PHYSICAL EXAMINATION AT DISCHARGE:  General:          Alert, cooperative, no distress, appears stated age. HEENT:           Atraumatic, anicteric sclerae, pink conjunctivae                          No oral ulcers, mucosa moist, throat clear, dentition fair  Neck:               Supple, symmetrical  Lungs:             Clear to auscultation bilaterally. No Wheezing or Rhonchi. No rales. Chest wall:      No tenderness  No Accessory muscle use. Heart:              Regular  rhythm,  No  murmur   No edema  Abdomen:        Soft, non-tender. Not distended. Bowel sounds normal  Extremities:     No cyanosis. No clubbing,                            Skin turgor normal, Capillary refill normal  Skin:                Not pale. Not Jaundiced  No rashes   Psych:             Not anxious or agitated.   Neurologic:      Alert, moves all extremities, answers questions appropriately and responds to commands       CHRONIC MEDICAL DIAGNOSES:  Problem List as of 2/17/2022 Date Reviewed: 4/20/2019          Codes Class Noted - Resolved    Other partial intestinal obstruction ([facility] ICD-10-CM: Z69.253  ICD-9-CM: 560.89  2/14/2022 - Present        Aspiration pneumonia ([facility] ICD-10-CM: J69.0  ICD-9-CM: 507.0  1/9/2019 - Present        Ankylosing spondylitis of multiple sites in spine [facility]) ICD-10-CM: M45.0  ICD-9-CM: 720.0  1/9/2019 - Present        Closed nondisplaced fracture of seventh cervical vertebra with routine healing ICD-10-CM: S12.601D  ICD-9-CM: V54.17  1/9/2019 - Present        Closed T7 fracture ([facility] ICD-10-CM: S33.843M  ICD-9-CM: 805.2  1/9/2019 - Present        Bed confinement status ICD-10-CM: Z74.01  ICD-9-CM: V49.84  1/9/2019 - Present        Drug-induced constipation ICD-10-CM: K59.03  ICD-9-CM: 564.09, E980.5  1/9/2019 - Present        Rheumatoid arthritis involving multiple sites with positive rheumatoid factor ([facility] ICD-10-CM: M05.79  ICD-9-CM: 714.0  12/3/2018 - Present        RESOLVED: Pneumonia ICD-10-CM: J18.9  ICD-9-CM: 569  2/12/2022 - 2/17/2022        RESOLVED: Acute hypoxemic respiratory failure ([facility] ICD-10-CM: J96.01  ICD-9-CM: 518.81  2/12/2022 - 2/17/2022        RESOLVED: Memory impairment ICD-10-CM: R41.3  ICD-9-CM: 780.93  1/9/2019 - 2/17/2022              Greater than 30  minutes were spent with the patient on counseling and coordination of care    Signed:   Dominique Johns MD  2/17/2022  10:07 AM

## 2022-02-18 ENCOUNTER — PATIENT OUTREACH (OUTPATIENT)
Dept: CASE MANAGEMENT | Age: 87
End: 2022-02-18

## 2022-02-18 NOTE — PROGRESS NOTES
Transition of care outreach postponed for 14 days due to patient's discharge to SNF. Will continue to monitor patient progress.

## 2022-03-04 ENCOUNTER — PATIENT OUTREACH (OUTPATIENT)
Dept: CASE MANAGEMENT | Age: 87
End: 2022-03-04

## 2022-03-04 NOTE — PROGRESS NOTES
Outgoing call made to [facility] SNF. Transition of care outreach postponed for 14 days due to patient's discharge to SNF. Will continue to monitor patient progress.

## 2022-03-11 ENCOUNTER — PATIENT OUTREACH (OUTPATIENT)
Dept: CASE MANAGEMENT | Age: 87
End: 2022-03-11

## 2022-03-11 NOTE — PROGRESS NOTES
Outgoing call made to [facility]. Spoke with Ana Guzman. Patient currently admitted to facility. Transition of care outreach postponed for 7 days due to patient's discharge to SNF. Will continue to monitor patient progress.

## 2022-03-14 ENCOUNTER — PATIENT OUTREACH (OUTPATIENT)
Dept: CASE MANAGEMENT | Age: 87
End: 2022-03-14

## 2022-03-14 NOTE — PROGRESS NOTES
Outgoing call made to [facility]. Spoke with Jennifer.  Introduced self,role and verified patient using 2 identifiers. Patient remains admitted to facility. Transition of care outreach postponed for 7 days due to patient's discharge to SNF. Will continue to monitor patient progress.

## 2022-03-16 ENCOUNTER — PATIENT OUTREACH (OUTPATIENT)
Dept: CASE MANAGEMENT | Age: 87
End: 2022-03-16

## 2022-03-16 NOTE — PROGRESS NOTES
Outgoing call made to [facility]. Spoke with .  Verified patient using 2 identifiers. Patient remains admitted to facility. Transition of care outreach postponed for 5 days due to patient's discharge to SNF. Will continue to monitor patient progress.

## 2022-03-22 ENCOUNTER — PATIENT OUTREACH (OUTPATIENT)
Dept: CASE MANAGEMENT | Age: 87
End: 2022-03-22

## 2022-03-22 NOTE — PROGRESS NOTES
Outgoing call made to [facility]. Spoke with Cristopher Aviles. Patient verified with 2 identifiers. Introduced self and role. Confirmed currently admitted to SNF. 30 post discharge writer will sign off.

## 2022-06-24 ENCOUNTER — APPOINTMENT (OUTPATIENT)
Dept: MRI IMAGING | Age: 87
DRG: 066 | End: 2022-06-24
Attending: FAMILY MEDICINE
Payer: MEDICARE

## 2022-06-24 ENCOUNTER — APPOINTMENT (OUTPATIENT)
Dept: CT IMAGING | Age: 87
DRG: 066 | End: 2022-06-24
Attending: EMERGENCY MEDICINE
Payer: MEDICARE

## 2022-06-24 ENCOUNTER — HOSPITAL ENCOUNTER (INPATIENT)
Age: 87
LOS: 3 days | Discharge: NURSING, REHAB OR LONG-TERM CARE | DRG: 066 | End: 2022-06-27
Attending: EMERGENCY MEDICINE | Admitting: FAMILY MEDICINE
Payer: MEDICARE

## 2022-06-24 DIAGNOSIS — R47.1 DYSARTHRIA: Primary | ICD-10-CM

## 2022-06-24 PROBLEM — I63.9 CVA (CEREBRAL VASCULAR ACCIDENT) (HCC): Status: ACTIVE | Noted: 2022-06-24

## 2022-06-24 LAB
ALBUMIN SERPL-MCNC: 3 G/DL (ref 3.5–5)
ALBUMIN/GLOB SERPL: 0.8 {RATIO} (ref 1.1–2.2)
ALP SERPL-CCNC: 77 U/L (ref 45–117)
ALT SERPL-CCNC: 17 U/L (ref 12–78)
ANION GAP SERPL CALC-SCNC: 6 MMOL/L (ref 5–15)
AST SERPL-CCNC: 13 U/L (ref 15–37)
BASOPHILS # BLD: 0.1 K/UL (ref 0–0.1)
BASOPHILS NFR BLD: 1 % (ref 0–1)
BILIRUB SERPL-MCNC: 0.4 MG/DL (ref 0.2–1)
BUN SERPL-MCNC: 15 MG/DL (ref 6–20)
BUN/CREAT SERPL: 32 (ref 12–20)
CALCIUM SERPL-MCNC: 9 MG/DL (ref 8.5–10.1)
CHLORIDE SERPL-SCNC: 105 MMOL/L (ref 97–108)
CK SERPL-CCNC: 33 U/L (ref 39–308)
CO2 SERPL-SCNC: 29 MMOL/L (ref 21–32)
COMMENT, HOLDF: NORMAL
CREAT SERPL-MCNC: 0.47 MG/DL (ref 0.7–1.3)
DIFFERENTIAL METHOD BLD: ABNORMAL
EOSINOPHIL # BLD: 0.8 K/UL (ref 0–0.4)
EOSINOPHIL NFR BLD: 14 % (ref 0–7)
ERYTHROCYTE [DISTWIDTH] IN BLOOD BY AUTOMATED COUNT: 13.8 % (ref 11.5–14.5)
GLOBULIN SER CALC-MCNC: 3.8 G/DL (ref 2–4)
GLUCOSE SERPL-MCNC: 111 MG/DL (ref 65–100)
HCT VFR BLD AUTO: 41.7 % (ref 36.6–50.3)
HGB BLD-MCNC: 14.1 G/DL (ref 12.1–17)
IMM GRANULOCYTES # BLD AUTO: 0 K/UL (ref 0–0.04)
IMM GRANULOCYTES NFR BLD AUTO: 0 % (ref 0–0.5)
INR PPP: 1 (ref 0.9–1.1)
LYMPHOCYTES # BLD: 1.4 K/UL (ref 0.8–3.5)
LYMPHOCYTES NFR BLD: 24 % (ref 12–49)
MCH RBC QN AUTO: 32 PG (ref 26–34)
MCHC RBC AUTO-ENTMCNC: 33.8 G/DL (ref 30–36.5)
MCV RBC AUTO: 94.6 FL (ref 80–99)
MONOCYTES # BLD: 0.5 K/UL (ref 0–1)
MONOCYTES NFR BLD: 8 % (ref 5–13)
NEUTS SEG # BLD: 3.1 K/UL (ref 1.8–8)
NEUTS SEG NFR BLD: 53 % (ref 32–75)
NRBC # BLD: 0 K/UL (ref 0–0.01)
NRBC BLD-RTO: 0 PER 100 WBC
PLATELET # BLD AUTO: 223 K/UL (ref 150–400)
PMV BLD AUTO: 9.7 FL (ref 8.9–12.9)
POTASSIUM SERPL-SCNC: 3.8 MMOL/L (ref 3.5–5.1)
PROT SERPL-MCNC: 6.8 G/DL (ref 6.4–8.2)
PROTHROMBIN TIME: 10.8 SEC (ref 9–11.1)
RBC # BLD AUTO: 4.41 M/UL (ref 4.1–5.7)
SAMPLES BEING HELD,HOLD: NORMAL
SODIUM SERPL-SCNC: 140 MMOL/L (ref 136–145)
TROPONIN-HIGH SENSITIVITY: 17 NG/L (ref 0–76)
TROPONIN-HIGH SENSITIVITY: 17 NG/L (ref 0–76)
WBC # BLD AUTO: 5.9 K/UL (ref 4.1–11.1)

## 2022-06-24 PROCEDURE — 94762 N-INVAS EAR/PLS OXIMTRY CONT: CPT

## 2022-06-24 PROCEDURE — 84484 ASSAY OF TROPONIN QUANT: CPT

## 2022-06-24 PROCEDURE — 85025 COMPLETE CBC W/AUTO DIFF WBC: CPT

## 2022-06-24 PROCEDURE — 80053 COMPREHEN METABOLIC PANEL: CPT

## 2022-06-24 PROCEDURE — 99285 EMERGENCY DEPT VISIT HI MDM: CPT

## 2022-06-24 PROCEDURE — 85610 PROTHROMBIN TIME: CPT

## 2022-06-24 PROCEDURE — 65270000046 HC RM TELEMETRY

## 2022-06-24 PROCEDURE — 70450 CT HEAD/BRAIN W/O DYE: CPT

## 2022-06-24 PROCEDURE — 74011250637 HC RX REV CODE- 250/637: Performed by: EMERGENCY MEDICINE

## 2022-06-24 PROCEDURE — 70551 MRI BRAIN STEM W/O DYE: CPT

## 2022-06-24 PROCEDURE — 74011250636 HC RX REV CODE- 250/636: Performed by: FAMILY MEDICINE

## 2022-06-24 PROCEDURE — 93005 ELECTROCARDIOGRAM TRACING: CPT

## 2022-06-24 PROCEDURE — 82550 ASSAY OF CK (CPK): CPT

## 2022-06-24 PROCEDURE — 36415 COLL VENOUS BLD VENIPUNCTURE: CPT

## 2022-06-24 RX ORDER — FAMOTIDINE 20 MG/1
20 TABLET, FILM COATED ORAL EVERY 24 HOURS
Status: DISCONTINUED | OUTPATIENT
Start: 2022-06-24 | End: 2022-06-27 | Stop reason: HOSPADM

## 2022-06-24 RX ORDER — SODIUM CHLORIDE 9 MG/ML
75 INJECTION, SOLUTION INTRAVENOUS CONTINUOUS
Status: DISPENSED | OUTPATIENT
Start: 2022-06-24 | End: 2022-06-25

## 2022-06-24 RX ORDER — MORPHINE SULFATE 15 MG/1
15 TABLET, FILM COATED, EXTENDED RELEASE ORAL EVERY 12 HOURS
Status: DISCONTINUED | OUTPATIENT
Start: 2022-06-24 | End: 2022-06-27 | Stop reason: HOSPADM

## 2022-06-24 RX ORDER — GUAIFENESIN 100 MG/5ML
81 LIQUID (ML) ORAL DAILY
Status: DISCONTINUED | OUTPATIENT
Start: 2022-06-25 | End: 2022-06-27 | Stop reason: HOSPADM

## 2022-06-24 RX ORDER — DULOXETIN HYDROCHLORIDE 60 MG/1
60 CAPSULE, DELAYED RELEASE ORAL DAILY
Status: DISCONTINUED | OUTPATIENT
Start: 2022-06-25 | End: 2022-06-25 | Stop reason: DRUGHIGH

## 2022-06-24 RX ORDER — FINASTERIDE 5 MG/1
5 TABLET, FILM COATED ORAL DAILY
Status: DISCONTINUED | OUTPATIENT
Start: 2022-06-25 | End: 2022-06-27 | Stop reason: HOSPADM

## 2022-06-24 RX ORDER — ACETAMINOPHEN 325 MG/1
650 TABLET ORAL
Status: DISCONTINUED | OUTPATIENT
Start: 2022-06-24 | End: 2022-06-27 | Stop reason: HOSPADM

## 2022-06-24 RX ORDER — ATORVASTATIN CALCIUM 40 MG/1
80 TABLET, FILM COATED ORAL
Status: DISCONTINUED | OUTPATIENT
Start: 2022-06-24 | End: 2022-06-25

## 2022-06-24 RX ORDER — ACETAMINOPHEN 650 MG/1
650 SUPPOSITORY RECTAL
Status: DISCONTINUED | OUTPATIENT
Start: 2022-06-24 | End: 2022-06-27 | Stop reason: HOSPADM

## 2022-06-24 RX ORDER — CYCLOBENZAPRINE HCL 10 MG
5 TABLET ORAL
Status: DISCONTINUED | OUTPATIENT
Start: 2022-06-24 | End: 2022-06-27 | Stop reason: HOSPADM

## 2022-06-24 RX ORDER — ASPIRIN 300 MG/1
300 SUPPOSITORY RECTAL
Status: COMPLETED | OUTPATIENT
Start: 2022-06-24 | End: 2022-06-24

## 2022-06-24 RX ADMIN — ASPIRIN 300 MG: 300 SUPPOSITORY RECTAL at 15:50

## 2022-06-24 RX ADMIN — SODIUM CHLORIDE 75 ML/HR: 9 INJECTION, SOLUTION INTRAVENOUS at 18:48

## 2022-06-24 NOTE — PROGRESS NOTES
Renal Dosing/Monitoring  Medication: Pepcid PO   Current regimen:  20mg every 12 hr  Recent Labs     06/24/22  1438   CREA 0.47*   BUN 15     Estimated CrCl:  48 ml/min  Plan: Change to 20mg q24h with respect to indication and renal status (CrCl <50 mL/min) per [facility]/[employer]-approved Renal Dosing Adjustment protocol. Pharmacy will continue to monitor this patient daily for changes in clinical status and renal function.

## 2022-06-24 NOTE — ED TRIAGE NOTES
Pt arrives via EMS from [facility] for slurred speech that started at 1200 today. Glucose 140 in route.

## 2022-06-24 NOTE — H&P
[address] [facility] Adult  Hospitalist Group  History and Physical    Date of Service:  6/24/2022  Primary Care Provider: None  Source of information: The patient and Chart review    Chief Complaint: Dysarthria      History of Presenting Illness:   Abiola Mcconnell is a 80 y.o. male who presents with dysarthria    Patient with severe contractures, bedbound, poor historian, presents from [facility] with dysarthria, apparently patient had some slurred speech earlier today, last known is unclear, reports that he had \"myself speech\" yesterday, denies any other complaints or problems, patient came in as a code stroke and was requested to be admitted to the hospitalist service. The patient denies any headache, blurry vision, sore throat, trouble swallowing, trouble with speech, chest pain, SOB, cough, fever, chills, N/V/D, abd pain, urinary symptoms, constipation, recent travels, sick contacts,, falls, injuries, rashes, contact with COVID-19 diagnosed patients, hematemesis, melena, hemoptysis, hematuria, rashes, denies starting any new medications and denies any other concerns or problems besides as mentioned above. REVIEW OF SYSTEMS:  A comprehensive review of systems was negative except for that written in the History of Present Illness. History reviewed. No pertinent past medical history. History reviewed. No pertinent surgical history. Prior to Admission medications    Medication Sig Start Date End Date Taking? Authorizing Provider   menthol-zinc oxide (CALMOSEPTINE) 0.44-20.6 % oint Apply 1 g to affected area as needed (apply generous amount to affected areas) for up to 100 doses. 3/19/19   Lowell Vasquez NP   DULoxetine (CYMBALTA) 60 mg capsule Take 1 Cap by mouth daily. 3/4/19   Lowell Vasquez NP   morphine CR (MS CONTIN) 15 mg CR tablet Take 1 Tab by mouth every twelve (12) hours.  Max Daily Amount: 30 mg. 3/30/19   Lowell Vasquez NP   naloxone [facility]) 4 mg/actuation nasal spray Use 1 spray intranasally, then discard. Repeat with new spray every 2 min as needed for opioid overdose symptoms, alternating nostrils. 2/4/19   Zora Failing, NP   finasteride (PROSCAR) 5 mg tablet Take 1 Tab by mouth daily. 12/13/18   Zora Failing, NP   senna-docusate (PERICOLACE) 8.6-50 mg per tablet Take 2 Tabs by mouth two (2) times a day. 12/13/18   Zora Failing, NP   cyclobenzaprine (FLEXERIL) 5 mg tablet Take 1 Tab by mouth three (3) times daily as needed for Muscle Spasm(s). 12/13/18   Zora Failing, NP   polyethylene glycol (MIRALAX) 17 gram/dose powder Take 17 g by mouth daily. Provider, Historical   bisacodyl (DULCOLAX, BISACODYL,) 10 mg suppository Insert 10 mg into rectum daily as needed (for constipation). Provider, Historical     No Known Allergies   History reviewed. No pertinent family history. Social History:  has an unknown smoking status. He has never used smokeless tobacco. He reports that he does not drink alcohol and does not use drugs. Family and social history were personally reviewed, all pertinent and relevant details are outlined as above.     Objective:     Visit Vitals  /62   Pulse (!) 54   Temp 98.1 °F (36.7 °C)   Resp 11   Wt 45.6 kg (100 lb 8.5 oz)   SpO2 97%   BMI 13.26 kg/m²      O2 Device: None (Room air)    PHYSICAL EXAM:   General: Awake, severe contractures  HEENT: PEERL, EOMI, moist mucus membranes  Neck: Supple, no JVD, no meningeal signs  Chest: Decreased basal breath sounds  CVS: RRR, S1 S2 heard, no murmurs/rubs/gallops  Abd: Soft, non-tender, non-distended, +bowel sounds   Ext: No clubbing, no cyanosis, no edema  Neuro/Psych: Pleasant mood and affect, limited exam due to severe contractures of the extremities, cranial nerves could not be tested well as patient not able to perform the tasks, sensory grossly grossly within normal limits, DTR 1+ for  Cap refill: Brisk, less than 3 seconds  Pulses: 2+, symmetric in all extremities  Skin: Warm, dry, without rashes or lesions    Data Review: All diagnostic labs and studies have been reviewed. Abnormal Labs Reviewed   CBC WITH AUTOMATED DIFF - Abnormal; Notable for the following components:       Result Value    EOSINOPHILS 14 (*)     ABS. EOSINOPHILS 0.8 (*)     All other components within normal limits   METABOLIC PANEL, COMPREHENSIVE - Abnormal; Notable for the following components:    Glucose 111 (*)     Creatinine 0.47 (*)     BUN/Creatinine ratio 32 (*)     AST (SGOT) 13 (*)     Albumin 3.0 (*)     A-G Ratio 0.8 (*)     All other components within normal limits       All Micro Results     Procedure Component Value Units Date/Time    URINE CULTURE HOLD SAMPLE [[id number]]     Order Status: Sent Specimen: Urine           IMAGING:   CT CODE NEURO HEAD WO CONTRAST   Final Result   No acute intracranial process seen           ECG/ECHO:    Results for orders placed or performed during the hospital encounter of 06/24/22   EKG, 12 LEAD, INITIAL   Result Value Ref Range    Ventricular Rate 64 BPM    Atrial Rate 64 BPM    P-R Interval 294 ms    QRS Duration 172 ms    Q-T Interval 482 ms    QTC Calculation (Bezet) 497 ms    Calculated P Axis 50 degrees    Calculated R Axis -63 degrees    Calculated T Axis 103 degrees    Diagnosis       Sinus rhythm with 1st degree AV block  Left axis deviation  Left bundle branch block  Abnormal ECG  When compared with ECG of 12-FEB-2022 06:41,  premature ventricular complexes are no longer present  T wave inversion no longer evident in Inferior leads  T wave inversion more evident in Lateral leads          Assessment:   Given the patient's current clinical presentation, there is a high level of concern for decompensation if discharged from the emergency department. Complex decision making was performed, which includes reviewing the patient's available past medical records, laboratory results, and imaging studies.     Dysarthria  Dehydration  BPH  Plan:   Patient will be admitted on a telemetry bed  Rule out CVA, MRI of the brain, neurovascular checks, neurology consult, aspirin, statin, neurology consult, troponin, telemetry monitoring, further intervention per hospital course, reassess as needed  Gentle IV hydration, repeat labs in the morning  Continue medication        DIET: DIET NPO   ISOLATION PRECAUTIONS: There are currently no Active Isolations  CODE STATUS: Prior   DVT PROPHYLAXIS: SCDs  FUNCTIONAL STATUS PRIOR TO HOSPITALIZATION: Capable of only limited self-care; confined to bed or chair likely more than 50% of waking hours. EARLY MOBILITY ASSESSMENT: Recommend routine ambulation while hospitalized with the assistance of nursing staff  ANTICIPATED DISCHARGE: 24-48 hours. Signed By: Emi Wilson MD     June 24, 2022         Please note that this dictation may have been completed with Dragon, the computer voice recognition software. Quite often unanticipated grammatical, syntax, homophones, and other interpretive errors are inadvertently transcribed by the computer software. Please disregard these errors. Please excuse any errors that have escaped final proofreading.

## 2022-06-24 NOTE — ED PROVIDER NOTES
Patient is an 51-year-old male with past medical history significant for ankylosing spondylitis, severe RA with chronic contractures who is wheelchair-bound at baseline presenting to the emergency department from care facility [facility] for evaluation of slurred speech which reportedly started at noon today. Last known normal is unclear but patient denies having this yesterday and does state that he feels as though his speech does not sound like his normal speech. History reviewed. No pertinent past medical history. History reviewed. No pertinent surgical history. History reviewed. No pertinent family history. Social History     Socioeconomic History    Marital status: SINGLE     Spouse name: Not on file    Number of children: Not on file    Years of education: Not on file    Highest education level: Not on file   Occupational History    Not on file   Tobacco Use    Smoking status: Unknown If Ever Smoked    Smokeless tobacco: Never Used   Substance and Sexual Activity    Alcohol use: No    Drug use: No    Sexual activity: Not Currently   Other Topics Concern    Not on file   Social History Narrative    Not on file     Social Determinants of Health     Financial Resource Strain:     Difficulty of Paying Living Expenses: Not on file   Food Insecurity:     Worried About Running Out of Food in the Last Year: Not on file    Telma of Food in the Last Year: Not on file   Transportation Needs:     Lack of Transportation (Medical): Not on file    Lack of Transportation (Non-Medical):  Not on file   Physical Activity:     Days of Exercise per Week: Not on file    Minutes of Exercise per Session: Not on file   Stress:     Feeling of Stress : Not on file   Social Connections:     Frequency of Communication with Friends and Family: Not on file    Frequency of Social Gatherings with Friends and Family: Not on file    Attends Nondenominational Services: Not on file   [employer] of Clubs or Organizations: Not on file    Attends Club or Organization Meetings: Not on file    Marital Status: Not on file   Intimate Partner Violence:     Fear of Current or Ex-Partner: Not on file    Emotionally Abused: Not on file    Physically Abused: Not on file    Sexually Abused: Not on file   Housing Stability:     Unable to Pay for Housing in the Last Year: Not on file    Number of [place] in the Last Year: Not on file    Unstable Housing in the Last Year: Not on file         ALLERGIES: Patient has no known allergies. Review of Systems   Constitutional: Negative for fever. HENT: Negative for drooling. Eyes: Negative for photophobia and visual disturbance. Respiratory: Negative for shortness of breath. Cardiovascular: Negative for chest pain. Gastrointestinal: Negative for abdominal pain. Musculoskeletal: Positive for neck pain (Chronic unchanged). Negative for back pain. Neurological: Positive for speech difficulty. Negative for seizures and syncope. Hematological: Does not bruise/bleed easily. Psychiatric/Behavioral: Negative. Vitals:    06/24/22 1422   BP: 138/88   Pulse: 70   Resp: 18   Temp: 98.7 °F (37.1 °C)   SpO2: 94%   Weight: 45.6 kg (100 lb 8.5 oz)            Physical Exam  Vitals and nursing note reviewed. Constitutional:       Appearance: Normal appearance. He is not toxic-appearing or diaphoretic. Comments: Elderly, condition   HENT:      Head: Atraumatic. Right Ear: External ear normal.      Left Ear: External ear normal.      Nose: Nose normal.   Eyes:      General: No scleral icterus. Neck:      Comments: Decreased mobility and neck which is baseline per patient. Cardiovascular:      Rate and Rhythm: Normal rate. Heart sounds: Murmur heard. No friction rub. Pulmonary:      Effort: Pulmonary effort is normal.      Breath sounds: Normal breath sounds. Abdominal:      Palpations: Abdomen is soft. Tenderness:  There is no abdominal tenderness. There is no guarding. Musculoskeletal:      Comments: Lower extremities contracted   Skin:     General: Skin is warm and dry. Neurological:      Mental Status: He is alert. Comments: Disoriented to time [facility] 1 for dysarthria   Psychiatric:         Mood and Affect: Mood normal.         Behavior: Behavior normal.         Thought Content: Thought content normal.         Judgment: Judgment normal.          [facility]    ED Course as of 06/24/22 1627   Fri Jun 24, 2022   1510 EKG obtained at 259 showing sinus rhythm with first-degree AV block, rate 64, left axis deviation, left bundle, ST depression in lateral leads, [JE]      ED Course User Index  [JE] James Corondao MD       Procedures    Perfect Serve Consult for Admission  4:30 PM    ED Room Number: ER19/19  Patient Name and age:  Ivonne Smith 80 y.o.  male  Working Diagnosis:   1. Dysarthria        COVID-19 Suspicion:  no  Sepsis present:  no  Reassessment needed: no  Code Status:  Do Not Resuscitate  Readmission: no  Isolation Requirements:  no  Recommended Level of Care:  telemetry  Department:[facility] Adult ED - 21   Other: Patient is an 43-year-old male with past medical history significant for rheumatoid arthritis and ankylosing spondylitis who is chronically wheelchair-bound as a result coming from assisted living facility for episode of dysarthria. Initial report was that this started at noon but last known normal is not clear. Patient seen by telemetry neurologist who recommended rectal aspirin and stroke work-up.

## 2022-06-25 ENCOUNTER — APPOINTMENT (OUTPATIENT)
Dept: NON INVASIVE DIAGNOSTICS | Age: 87
DRG: 066 | End: 2022-06-25
Attending: FAMILY MEDICINE
Payer: MEDICARE

## 2022-06-25 LAB
ANION GAP SERPL CALC-SCNC: 6 MMOL/L (ref 5–15)
ATRIAL RATE: 64 BPM
BUN SERPL-MCNC: 10 MG/DL (ref 6–20)
BUN/CREAT SERPL: 34 (ref 12–20)
CALCIUM SERPL-MCNC: 8.9 MG/DL (ref 8.5–10.1)
CALCULATED P AXIS, ECG09: 50 DEGREES
CALCULATED R AXIS, ECG10: -63 DEGREES
CALCULATED T AXIS, ECG11: 103 DEGREES
CHLORIDE SERPL-SCNC: 107 MMOL/L (ref 97–108)
CHOLEST SERPL-MCNC: 178 MG/DL
CK SERPL-CCNC: 33 U/L (ref 39–308)
CO2 SERPL-SCNC: 27 MMOL/L (ref 21–32)
CREAT SERPL-MCNC: 0.29 MG/DL (ref 0.7–1.3)
CRP SERPL HS-MCNC: 3.6 MG/L
DIAGNOSIS, 93000: NORMAL
ERYTHROCYTE [DISTWIDTH] IN BLOOD BY AUTOMATED COUNT: 13.6 % (ref 11.5–14.5)
ERYTHROCYTE [SEDIMENTATION RATE] IN BLOOD: 15 MM/HR (ref 0–20)
EST. AVERAGE GLUCOSE BLD GHB EST-MCNC: 111 MG/DL
GLUCOSE SERPL-MCNC: 95 MG/DL (ref 65–100)
HBA1C MFR BLD: 5.5 % (ref 4–5.6)
HCT VFR BLD AUTO: 40.7 % (ref 36.6–50.3)
HDLC SERPL-MCNC: 62 MG/DL
HDLC SERPL: 2.9 {RATIO} (ref 0–5)
HGB BLD-MCNC: 14.1 G/DL (ref 12.1–17)
LDLC SERPL CALC-MCNC: 103.2 MG/DL (ref 0–100)
MAGNESIUM SERPL-MCNC: 2.2 MG/DL (ref 1.6–2.4)
MCH RBC QN AUTO: 32.3 PG (ref 26–34)
MCHC RBC AUTO-ENTMCNC: 34.6 G/DL (ref 30–36.5)
MCV RBC AUTO: 93.1 FL (ref 80–99)
NRBC # BLD: 0 K/UL (ref 0–0.01)
NRBC BLD-RTO: 0 PER 100 WBC
P-R INTERVAL, ECG05: 294 MS
PHOSPHATE SERPL-MCNC: 3.2 MG/DL (ref 2.6–4.7)
PLATELET # BLD AUTO: 196 K/UL (ref 150–400)
PMV BLD AUTO: 9.4 FL (ref 8.9–12.9)
POTASSIUM SERPL-SCNC: 3.9 MMOL/L (ref 3.5–5.1)
Q-T INTERVAL, ECG07: 482 MS
QRS DURATION, ECG06: 172 MS
QTC CALCULATION (BEZET), ECG08: 497 MS
RBC # BLD AUTO: 4.37 M/UL (ref 4.1–5.7)
SODIUM SERPL-SCNC: 140 MMOL/L (ref 136–145)
TRIGL SERPL-MCNC: 64 MG/DL (ref ?–150)
TROPONIN-HIGH SENSITIVITY: 20 NG/L (ref 0–76)
TSH SERPL DL<=0.05 MIU/L-ACNC: 1 UIU/ML (ref 0.36–3.74)
VENTRICULAR RATE, ECG03: 64 BPM
VLDLC SERPL CALC-MCNC: 12.8 MG/DL
WBC # BLD AUTO: 5.9 K/UL (ref 4.1–11.1)

## 2022-06-25 PROCEDURE — 97535 SELF CARE MNGMENT TRAINING: CPT

## 2022-06-25 PROCEDURE — 97530 THERAPEUTIC ACTIVITIES: CPT | Performed by: PHYSICAL THERAPIST

## 2022-06-25 PROCEDURE — 97161 PT EVAL LOW COMPLEX 20 MIN: CPT | Performed by: PHYSICAL THERAPIST

## 2022-06-25 PROCEDURE — 82550 ASSAY OF CK (CPK): CPT

## 2022-06-25 PROCEDURE — 65270000046 HC RM TELEMETRY

## 2022-06-25 PROCEDURE — 74011250637 HC RX REV CODE- 250/637: Performed by: INTERNAL MEDICINE

## 2022-06-25 PROCEDURE — 80061 LIPID PANEL: CPT

## 2022-06-25 PROCEDURE — 83735 ASSAY OF MAGNESIUM: CPT

## 2022-06-25 PROCEDURE — 84484 ASSAY OF TROPONIN QUANT: CPT

## 2022-06-25 PROCEDURE — 80048 BASIC METABOLIC PNL TOTAL CA: CPT

## 2022-06-25 PROCEDURE — 97165 OT EVAL LOW COMPLEX 30 MIN: CPT

## 2022-06-25 PROCEDURE — 85027 COMPLETE CBC AUTOMATED: CPT

## 2022-06-25 PROCEDURE — 83036 HEMOGLOBIN GLYCOSYLATED A1C: CPT

## 2022-06-25 PROCEDURE — 84443 ASSAY THYROID STIM HORMONE: CPT

## 2022-06-25 PROCEDURE — 99222 1ST HOSP IP/OBS MODERATE 55: CPT | Performed by: PSYCHIATRY & NEUROLOGY

## 2022-06-25 PROCEDURE — 86141 C-REACTIVE PROTEIN HS: CPT

## 2022-06-25 PROCEDURE — 36415 COLL VENOUS BLD VENIPUNCTURE: CPT

## 2022-06-25 PROCEDURE — 74011250637 HC RX REV CODE- 250/637: Performed by: FAMILY MEDICINE

## 2022-06-25 PROCEDURE — 85652 RBC SED RATE AUTOMATED: CPT

## 2022-06-25 PROCEDURE — 84100 ASSAY OF PHOSPHORUS: CPT

## 2022-06-25 RX ORDER — ENOXAPARIN SODIUM 100 MG/ML
30 INJECTION SUBCUTANEOUS EVERY 24 HOURS
Status: DISCONTINUED | OUTPATIENT
Start: 2022-06-26 | End: 2022-06-27 | Stop reason: HOSPADM

## 2022-06-25 RX ORDER — ENOXAPARIN SODIUM 100 MG/ML
40 INJECTION SUBCUTANEOUS EVERY 24 HOURS
Status: DISCONTINUED | OUTPATIENT
Start: 2022-06-26 | End: 2022-06-25 | Stop reason: DRUGHIGH

## 2022-06-25 RX ORDER — DULOXETIN HYDROCHLORIDE 30 MG/1
30 CAPSULE, DELAYED RELEASE ORAL DAILY
Status: DISCONTINUED | OUTPATIENT
Start: 2022-06-25 | End: 2022-06-27 | Stop reason: HOSPADM

## 2022-06-25 RX ORDER — DULOXETIN HYDROCHLORIDE 30 MG/1
30 CAPSULE, DELAYED RELEASE ORAL DAILY
COMMUNITY

## 2022-06-25 RX ORDER — ATORVASTATIN CALCIUM 40 MG/1
40 TABLET, FILM COATED ORAL
Status: DISCONTINUED | OUTPATIENT
Start: 2022-06-25 | End: 2022-06-27 | Stop reason: HOSPADM

## 2022-06-25 RX ADMIN — ASPIRIN 81 MG CHEWABLE TABLET 81 MG: 81 TABLET CHEWABLE at 10:22

## 2022-06-25 RX ADMIN — MORPHINE SULFATE 15 MG: 15 TABLET, FILM COATED, EXTENDED RELEASE ORAL at 10:22

## 2022-06-25 RX ADMIN — ATORVASTATIN CALCIUM 40 MG: 40 TABLET, FILM COATED ORAL at 21:33

## 2022-06-25 RX ADMIN — MORPHINE SULFATE 15 MG: 15 TABLET, FILM COATED, EXTENDED RELEASE ORAL at 21:33

## 2022-06-25 RX ADMIN — DULOXETINE HYDROCHLORIDE 30 MG: 30 CAPSULE, DELAYED RELEASE ORAL at 13:55

## 2022-06-25 RX ADMIN — FINASTERIDE 5 MG: 5 TABLET, FILM COATED ORAL at 10:22

## 2022-06-25 RX ADMIN — FAMOTIDINE 20 MG: 20 TABLET ORAL at 10:22

## 2022-06-25 NOTE — PROGRESS NOTES
[facility] Adult  Hospitalist Group                                                                                          Hospitalist Progress Note  Rashad Krause MD  Answering service: [phone] from in house phone        Date of Service:  2022  NAME:  Ashley Damon  :  1935  MRN:  [id number]      Admission Summary:   Ashley Damon is a 80 y.o. male who presents with dysarthria     Patient with severe contractures, bedbound, poor historian, presents from [facility] with dysarthria, apparently patient had some slurred speech earlier today, last known is unclear, reports that he had \"myself speech\" yesterday, denies any other complaints or problems, patient came in as a code stroke and was requested to be admitted to the hospitalist service. Interval history / Subjective:   Feels better than yesterday but not quite back to his baseline, states still having some trouble with words  Oriented x2-3 (knows president, not year); slight dysarthria  NAD  Failed swallow eval last night, is NPO for SLP     Assessment & Plan:     Dysarthria, r/o CVA  CT head nothing acute  MRI shows small acute lacunar infact  Neurology consulted  Started ASA, statin  PT/OT/ST  TSH wnl  CRP, ESR wnl  A1c wnl    Permissive HTN (not on any BP meds at home)    Pre-renal azotemia w/o ALESIA  Cont IVF for today    BPH  Cont home med    Baseline bedbound with contractures  Cont home meds    Code status: DNR  Prophylaxis: SCDs  Care Plan discussed with: Patient/Family  Anticipated Disposition: return to LTC  Anticipated Discharge: 24 hours to 50 hours     Hospital Problems  Date Reviewed: 2019          Codes Class Noted POA    CVA (cerebral vascular accident) [facility]) ICD-10-CM: I63.9  ICD-9-CM: 434.91  2022 Unknown              Review of Systems:   Pertinent items are noted in HPI    Vital Signs:    Last 24hrs VS reviewed since prior progress note.  Most recent are:  Visit Vitals  /79 Pulse 96   Temp 97.5 °F (36.4 °C)   Resp 18   Wt 45.4 kg (100 lb 1.4 oz)   SpO2 98%   BMI 13.21 kg/m²       No intake or output data in the 24 hours ending 06/25/22 1423     Physical Examination:   I had a face to face encounter with this patient and independently examined them on 6/25/2022 as outlined below:    General: Alert, cooperative, no acute distress    EENT:  EOMI. Anicteric sclerae. dryMM  Resp:  CTA bilaterally, no wheezing or rales. No accessory muscle use  CV:  RRR, No audible murmur  GI:  Soft, Non distended, Non tender  Neurologic:  Alert and oriented x2-3 (knows president, not year), mild dysarthria, moves UE normally, limited ROM b/l LE d/t chronic bedbound and severe contractures  Extremities: No edema or cyanosis  Psych:   Not anxious or agitated  Skin:  No rashes. No jaundice       Data Review:   I personally reviewed: vitals, labs, imaging results, notes    Labs:     Recent Labs     06/25/22  0134 06/24/22  1438   WBC 5.9 5.9   HGB 14.1 14.1   HCT 40.7 41.7    223     Recent Labs     06/25/22  0134 06/24/22  1438    140   K 3.9 3.8    105   CO2 27 29   BUN 10 15   CREA 0.29* 0.47*   GLU 95 111*   CA 8.9 9.0   MG 2.2  --    PHOS 3.2  --      Recent Labs     06/24/22  1438   ALT 17   AP 77   TBILI 0.4   TP 6.8   ALB 3.0*   GLOB 3.8     Recent Labs     06/24/22  1438   INR 1.0   PTP 10.8      No results for input(s): FE, TIBC, PSAT, FERR in the last 72 hours. No results found for: FOL, RBCF   No results for input(s): PH, PCO2, PO2 in the last 72 hours.   Recent Labs     06/25/22 0134 06/24/22  1854   CPK 33* 33*     Lab Results   Component Value Date/Time    Cholesterol, total 178 06/25/2022 01:34 AM    HDL Cholesterol 62 06/25/2022 01:34 AM    LDL, calculated 103.2 (H) 06/25/2022 01:34 AM    Triglyceride 64 06/25/2022 01:34 AM    CHOL/HDL Ratio 2.9 06/25/2022 01:34 AM     No results found for: [facility]  Lab Results   Component Value Date/Time    Color DARK YELLOW 02/12/2022 06:36 PM    Appearance CLEAR 02/12/2022 06:36 PM    Specific gravity 1.021 02/12/2022 06:36 PM    pH (UA) 5.5 02/12/2022 06:36 PM    Protein 30 (A) 02/12/2022 06:36 PM    Glucose Negative 02/12/2022 06:36 PM    Ketone TRACE (A) 02/12/2022 06:36 PM    Bilirubin Negative 02/12/2022 06:36 PM    Urobilinogen 0.2 02/12/2022 06:36 PM    Nitrites Negative 02/12/2022 06:36 PM    Leukocyte Esterase SMALL (A) 02/12/2022 06:36 PM    Epithelial cells FEW 02/12/2022 06:36 PM    Bacteria Negative 02/12/2022 06:36 PM    WBC 5-10 02/12/2022 06:36 PM    RBC 5-10 02/12/2022 06:36 PM       Medications Reviewed:     Current Facility-Administered Medications   Medication Dose Route Frequency    DULoxetine (CYMBALTA) capsule 30 mg  30 mg Oral DAILY    cyclobenzaprine (FLEXERIL) tablet 5 mg  5 mg Oral TID PRN    finasteride (PROSCAR) tablet 5 mg  5 mg Oral DAILY    morphine CR (MS CONTIN) tablet 15 mg  15 mg Oral Q12H    acetaminophen (TYLENOL) tablet 650 mg  650 mg Oral Q4H PRN    Or    acetaminophen (TYLENOL) solution 650 mg  650 mg Per NG tube Q4H PRN    Or    acetaminophen (TYLENOL) suppository 650 mg  650 mg Rectal Q4H PRN    0.9% sodium chloride infusion  75 mL/hr IntraVENous CONTINUOUS    aspirin chewable tablet 81 mg  81 mg Oral DAILY    atorvastatin (LIPITOR) tablet 80 mg  80 mg Oral QHS    famotidine (PEPCID) tablet 20 mg  20 mg Oral Q24H     ______________________________________________________________________  EXPECTED LENGTH OF STAY: - - -  ACTUAL LENGTH OF STAY:          1                 Maria Luisa Vega MD

## 2022-06-25 NOTE — PROGRESS NOTES
OCCUPATIONAL THERAPY EVALUATION/DISCHARGE  Patient: Ashley Damon (1 y.o. male)  Date: 6/25/2022  Primary Diagnosis: CVA (cerebral vascular accident) [facility]) [I63.9]       Precautions:  Fall,Skin    ASSESSMENT  Based on the objective data described below, the patient presents with slurred speech and weakness, appearing near his functional baseline despite MRI+ for acute L pontine infarct. Patient admitted from [facility] (repors this is where he resides), receives assist for all ADLs/IADLs and mobility via Raffi, significant BLE contractures noted. He now presents near his baseline, requiring Mod-Total A for bed level ADLs, reporting some bowel continence, requiring Total A for bowel hygiene and donning clean brief. He is pleasantly confused, continues to require assist for all ADLs and mobility similar to his baseline, therefore recommend d/c back to SNF/LTC with assist PRN. Current Level of Function (ADLs/self-care): Mod-Total A for ADLs and bed mobility    Functional Outcome Measure: The patient scored 10/100 on the Barthel Index indicating he is totally dependent in basic self care & mobility    Other factors to consider for discharge: PMH, PLOF     PLAN :  Recommendation for discharge: (in order for the patient to meet his/her long term goals)  Return to SNF/LTC with assist for all functional activity & ADLs/IADLs    This discharge recommendation:  A follow-up discussion with the attending provider and/or case management is planned    IF patient discharges home will need the following DME: none       SUBJECTIVE:   Patient stated Can you get me some water?  educated on RN clarification and SLP evaluation     OBJECTIVE DATA SUMMARY:   HISTORY:   History reviewed. No pertinent past medical history. History reviewed. No pertinent surgical history.     Prior Level of Function/Environment/Context:   Expanded or extensive additional review of patient history:     Home Situation  Home Environment: Skilled nursing facility  [facility] Name: [facility]    Hand dominance: Right    EXAMINATION OF PERFORMANCE DEFICITS:  Cognitive/Behavioral Status:  Neurologic State: Alert  Orientation Level: Oriented to person;Oriented to place;Oriented to situation;Disoriented to time  Cognition: Decreased attention/concentration; Follows commands  Perception: Appears intact  Perseveration: No perseveration noted  Safety/Judgement: Awareness of environment;Decreased awareness of need for assistance;Decreased awareness of need for safety    Skin: Some scattered erythema and small wounds     Edema: None    Hearing: Auditory  Auditory Impairment: None    Vision/Perceptual:    Tracking: Able to track stimulus in all quadrants w/o difficulty                 Diplopia: No              Range of Motion:  AROM: Grossly decreased, non-functional (baseline BLE contractures, BUE decreased)                         Strength:  Strength: Generally decreased, functional (baseline BLE contracture, BUE decreased)                Coordination:  Coordination: Generally decreased, functional  Fine Motor Skills-Upper: Left Impaired;Right Impaired    Gross Motor Skills-Upper: Left Impaired;Right Impaired    Tone & Sensation:  Tone: Abnormal (BLE contracture (flexor))       Functional Mobility and Transfers for ADLs:  Bed Mobility:  Rolling: Moderate assistance  Scooting: Maximum assistance;Assist x2    Transfers: Toilet Transfer : Total assistance (infer OOB per AROM and bed mobility)    ADL Assessment:  Feeding: Moderate assistance    Oral Facial Hygiene/Grooming: Maximum assistance    Bathing: Maximum assistance         Upper Body Dressing: Maximum assistance    Lower Body Dressing: Total assistance    Toileting: Total assistance                ADL Intervention and task modifications:     Lower Body Dressing Assistance  Dressing Assistance: Total assistance(dependent)  Protective [place]:  Total assistance (dependent)  Leg Crossed Method Used: No  Position Performed: Supine  Cues: Physical assistance; Tactile cues provided;Verbal cues provided;Visual cues provided    Toileting  Toileting Assistance: Total assistance(dependent)  Bladder Hygiene: Total assistance (dependent)  Bowel Hygiene: Total assistance (dependent)  Clothing Management: Total assistance (dependent)  Cues: Tactile cues provided;Verbal cues provided;Visual cues provided  Adaptive Equipment:  (bed rails)    Cognitive Retraining  Safety/Judgement: Awareness of environment;Decreased awareness of need for assistance;Decreased awareness of need for safety      Functional Measure:    Barthel Index:  Bathin  Bladder: 0  Bowels: 5  Groomin  Dressin  Feedin  Mobility: 0  Stairs: 0  Toilet Use: 0  Transfer (Bed to Chair and Back): 0  Total: 10/100      The Barthel ADL Index: Guidelines  1. The index should be used as a record of what a patient does, not as a record of what a patient could do. 2. The main aim is to establish degree of independence from any help, physical or verbal, however minor and for whatever reason. 3. The need for supervision renders the patient not independent. 4. A patient's performance should be established using the best available evidence. Asking the patient, friends/relatives and nurses are the usual sources, but direct observation and common sense are also important. However direct testing is not needed. 5. Usually the patient's performance over the preceding 24-48 hours is important, but occasionally longer periods will be relevant. 6. Middle categories imply that the patient supplies over 50 per cent of the effort. 7. Use of aids to be independent is allowed. Score Interpretation (from [address] [facility] 83)    Independent   60-79 Minimally independent   40-59 Partially dependent   20-39 Very dependent   <20 Totally dependent     -Carmella Leonard., Barthel, D.W. (1965). Functional evaluation: the Barthel Index. [facility] ([address] [place] [address] (1997). The Barthel activities of daily living index: self-reporting versus actual performance in the old (> or = 75 years). Journal of [address] [place] [address] 457), [facility], .BEVERLYF, Julita Watts., Ene Rick. (1999). Measuring the change in disability after inpatient rehabilitation; comparison of the responsiveness of the Barthel Index and Functional Adams Measure. Journal of Neurology, Neurosurgery, and Psychiatry, 66(4), 796-406. HAI Victor, MELISSA Weber, & Batsheva Dias M.A. (2004) Assessment of post-stroke quality of life in cost-effectiveness studies: The usefulness of the Barthel Index and the EuroQoL-5D. Quality of Life Research, 15, 678-63     Unable to complete Marychuy Stagefredo d/t baseline Bin STEVENS weak    Occupational Therapy Evaluation Charge Determination   History Examination Decision-Making   LOW Complexity : Brief history review  HIGH Complexity : 5 or more performance deficits relating to physical, cognitive , or psychosocial skils that result in activity limitations and / or participation restrictions HIGH Complexity : Patient presents with comorbidities that affect occupational performance. Signifigant modification of tasks or assistance (eg, physical or verbal) with assessment (s) is necessary to enable patient to complete evaluation       Based on the above components, the patient evaluation is determined to be of the following complexity level: LOW   Pain Rating:  Generalized pain reported    Activity Tolerance:   Fair    After treatment patient left in no apparent distress:    Supine in bed, Call bell within reach, Bed / chair alarm activated and Side rails x 3    COMMUNICATION/EDUCATION:   The patients plan of care was discussed with: Physical therapist and Registered nurse.      Thank you for this referral.  GARRET Walls, OTR/L  Time Calculation: 13 mins

## 2022-06-25 NOTE — PROGRESS NOTES
CM attempted to speak with patient via room phone, no answer. Call placed to patient's home number which is disc. Call placed to patient's mobile/emergency contact (same number listed) and left a voice message.       10:29 AM  NEGAR Blancas

## 2022-06-25 NOTE — PROGRESS NOTES
PHYSICAL THERAPY EVALUATION/DISCHARGE  Patient: Jose Byrne (66 y.o. male)  Date: 6/25/2022  Primary Diagnosis: CVA (cerebral vascular accident) [facility]) [I63.9]       Precautions:   Fall,Skin      ASSESSMENT  Based on the objective data described below, the patient presents with near baseline level of function as compared to baseline. Patient presents with B LE contractures, generalized weakness, impaired balance and decreased activity tolerance. He is bedbound/w/c bound and living at [facility] prior to admit. Uses caterina lift for in/out of bed. Patient able to roll in bed with modA and scoot with maxA x 2. He is near his baseline at this time. PT to signoff. Please re-consult should patient status change. Other factors to consider for discharge: none     Further skilled acute physical therapy is not indicated at this time. PLAN :  Recommendation for discharge: (in order for the patient to meet his/her long term goals)  Therapy up to 5 days/week in SNF setting (return to LTC)      IF patient discharges home will need the following DME: patient owns DME required for discharge       SUBJECTIVE:   Patient stated They lift me up to get into the chair.     OBJECTIVE DATA SUMMARY:   HISTORY:    History reviewed. No pertinent past medical history. History reviewed. No pertinent surgical history. Prior level of function: Lives at [facility] and is w/c bound.   Baseline B LE contractures  Personal factors and/or comorbidities impacting plan of care:     Home Situation  Home Environment: [address] [place] [address] Name: [facility]    EXAMINATION/PRESENTATION/DECISION MAKING:   Critical Behavior:  Neurologic State: Alert  Orientation Level: Oriented to person,Oriented to place,Oriented to situation,Disoriented to time  Cognition: Decreased attention/concentration,Follows commands  Safety/Judgement: Awareness of environment,Decreased awareness of need for assistance,Decreased awareness of need for safety  Hearing: Auditory  Auditory Impairment: None    Range Of Motion:  AROM: Grossly decreased, non-functional (baseline BLE contractures, BUE decreased)                       Strength:    Strength: Generally decreased, functional (baseline BLE contracture, BUE decreased)                    Tone & Sensation:   Tone: Abnormal (BLE contracture (flexor))                              Coordination:  Coordination: Generally decreased, functional  Vision:   Tracking: Able to track stimulus in all quadrants w/o difficulty  Diplopia: No  Functional Mobility:  Bed Mobility:  Rolling: Moderate assistance        Scooting: Maximum assistance;Assist x2  Transfers:       Pain Rating:  No c/o pain    Activity Tolerance:   Fair and requires rest breaks      After treatment patient left in no apparent distress:   Supine in bed, Bed / chair alarm activated and Side rails x 3    COMMUNICATION/EDUCATION:   The patients plan of care was discussed with: Physical therapist, Occupational therapist and Registered nurse. Fall prevention education was provided and the patient/caregiver indicated understanding., Patient/family have participated as able in goal setting and plan of care. and Patient/family agree to work toward stated goals and plan of care.     Thank you for this referral.  Gely Alfonso, PT, DPT   Time Calculation: 13 mins

## 2022-06-25 NOTE — PROGRESS NOTES
2301 - Patient failed swallow screen, notified Dr. Wilmer Munoz, order for NPO and speech evaluation. [id number] - Notified provider regarding patient elevated BP.

## 2022-06-25 NOTE — PROGRESS NOTES
Lovenox Monitoring  Indication: DVT Prophylaxis  Recent Labs     06/25/22  0134 06/24/22  1438   HGB 14.1 14.1    223   INR  --  1.0   CREA 0.29* 0.47*     Current Weight: 45.4 kg  Est. CrCl = 47.7 ml/min  Current Dose: 40 mg subcutaneously every 24 hours. Plan: Change to enoxaparin 30mg q24h with respect to indication and renal status (CrCl >30 mL/min + weight <50.9kg ) per [facility]-approved Renal Dosing Adjustment protocol. Pharmacy will continue to monitor this patient daily for changes in clinical status and renal function.

## 2022-06-25 NOTE — ED NOTES
TRANSFER - OUT REPORT:    Verbal report given to Kristyn RN(name) on Abiola Mcconnell  being transferred to 6S(unit) for routine progression of care       Report consisted of patients Situation, Background, Assessment and   Recommendations(SBAR). Information from the following report(s) SBAR, Kardex, ED Summary, [facility] and Recent Results was reviewed with the receiving nurse. Lines:   Peripheral IV 06/24/22 Left Antecubital (Active)   Site Assessment Clean, dry, & intact 06/24/22 1441   Phlebitis Assessment 0 06/24/22 1441   Infiltration Assessment 0 06/24/22 1441   Dressing Status Clean, dry, & intact 06/24/22 1441   Hub Color/Line Status Pink 06/24/22 1441   Action Taken Blood drawn 06/24/22 1441        Opportunity for questions and clarification was provided.       Patient transported with:   Registered Nurse

## 2022-06-25 NOTE — PROGRESS NOTES
Bedside and Verbal shift change report given to [address] (oncoming nurse) by Jeimy Woods RN (offgoing nurse). Report included the following information SBAR, Kardex, Intake/Output, MAR, Recent Results and Med Rec Status.

## 2022-06-25 NOTE — CONSULTS
[address]    Name:  Aida Neely  MR#:  [id number]  :  1935  ACCOUNT #:  [de-identified]  DATE OF SERVICE:  2022    REQUESTING PHYSICIAN:  Dr. Ellie Nelson EVALUATION:  Stroke. HISTORY OF PRESENT ILLNESS:  The patient is an 77-year-old male with past medical history of advanced rheumatoid arthritis, with multiple joint deformities, nonambulatory at baseline, lives at assisted living facility. Apparently, the patient was not talking right yesterday and had difficulties articulating his words, and was brought into the hospital.  His speech seems to have resolved, but he reports that he has been experiencing some numbness in his right hand. There are no other new focal motor or sensory deficits. Denies any headache, changes in vision, or facial symmetry. No chest pain, shortness of breath, palpitations, nausea or vomiting. PAST MEDICAL HISTORY:  As mentioned above. HOME MEDICATIONS:  1. Duloxetine. 2.  Proscar. 3.  Flexeril as needed. 4.  MiraLax powder. ALLERGIES:  NONE. SOCIAL HISTORY:  No smoking or alcohol use. Bed-bound at baseline. PHYSICAL EXAMINATION:  GENERAL:  The patient is alert, fully oriented, answers simple questions, and follows commands. VITAL SIGNS:  Blood pressure 135/79, temperature 97.5, pulse is 96. NEUROLOGIC:  Speech is clear. Comprehension is normal.  Pupils are equal, round, and reactive. There is subtle facial asymmetry with slight obliteration of the nasolabial fold on the right. Visual fields appear intact. Hearing is intact. Muscle tone is normal.  Bulk significantly reduced in the lower extremity muscles, right worse than left. Not much muscle mass seen in the right leg below the knee. Contracture deformity is present in the knees, ankles, and intrinsic foot joints. Some deformities noted in the hands as well.   He is able to raise his arms against gravity and appears to have symmetric strength bilaterally. Sensation is slightly impaired to light touch over the right hand when compared to the left. HEART:  Regular rate and rhythm. CHEST:  Clear. ABDOMEN:  Soft and nontender. Positive bowel sounds. EXTREMITIES:  No edema. LABORATORY DATA:  CBC is unremarkable. Chemistry:  Sodium 140, potassium 3.9, BUN 10, creatinine 0.29. Lipid profile:  Triglycerides 64, total cholesterol 178, . 2. Hemoglobin A1c 5.5. DIAGNOSTIC DATA:  CT brain is unremarkable. MRI scan of the brain done yesterday shows a small infarct in the anterior left princess. There is severe chronic microvascular ischemic change and severe cerebral atrophy. 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 Echocardiogram shows sinus rhythm with first-degree AV block, left axis deviation. ASSESSMENT AND PLAN:  An 49-year-old male with advanced rheumatoid arthritis, multiple joint deformities, bed-bound at baseline. He was brought into the hospital after he was noted to have dysarthria and numbness in his right hand. There is subtle facial asymmetry as well on exam.  MRI shows an acute left pontine infarct along with extensive small vessel ischemic change and atrophy of the brain. This is most likely due to small vessel intracranial atherosclerotic vascular disease. I agree with starting aspirin and statin. I recommend checking carotid duplex. Physical Therapy/Occupational Therapy evaluation, and we can initiate discharge planning back to the nursing home.       Gissell Argueta MD      [id number]  D:  06/25/2022 15:37  T:  06/25/2022 19:31  JOB #:  [id number]

## 2022-06-25 NOTE — PROGRESS NOTES
Lovenox Monitoring  Indication: DVT Prophylaxis  Recent Labs     06/25/22  0134 06/24/22  1438   HGB 14.1 14.1    223   INR  --  1.0   CREA 0.29* 0.47*     Current Weight: 45.4 kg  Est. CrCl = 47.7 ml/min  Current Dose: 40 mg subcutaneously every 24 hours. Plan: Change to enoxaparin 30mg q24h with respect to indication and renal status (CrCl >30 mL/min + weight <50.9kg ) per [facility]-approved Renal Dosing Adjustment protocol. Pharmacy will continue to monitor this patient daily for changes in clinical status and renal function.       (CLOSE I-vent after review and verification)
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
## 2022-06-25 NOTE — CONSULTS
Consult dictated. 80-year-old male presenting with dysarthria and right hand numbness. MRI shows acute left pontine infarct, extensive small vessel ischemic change and atrophy. This is most likely due to small vessel intracranial atherosclerotic vascular disease. Agree with aspirin and statin. Check carotid duplex. PT/OT.  If doppler negative may DC to Armin Brownlee MD

## 2022-06-25 NOTE — PROGRESS NOTES
Problem: Falls - Risk of  Goal: *Absence of Falls  Description: Document Collin Flores Fall Risk and appropriate interventions in the flowsheet. Outcome: Progressing Towards Goal  Note: Fall Risk Interventions:            Medication Interventions: Assess postural VS orthostatic hypotension,Bed/chair exit alarm,Evaluate medications/consider consulting pharmacy    Elimination Interventions: Bed/chair exit alarm,Call light in reach,Patient to call for help with toileting needs              Problem: Patient Education: Go to Patient Education Activity  Goal: Patient/Family Education  Outcome: Progressing Towards Goal     Problem: Pressure Injury - Risk of  Goal: *Prevention of pressure injury  Description: Document Nader Scale and appropriate interventions in the flowsheet.   Outcome: Progressing Towards Goal  Note: Pressure Injury Interventions:  Sensory Interventions: Assess changes in LOC,Discuss PT/OT consult with provider,Float heels,Keep linens dry and wrinkle-free,Minimize linen layers    Moisture Interventions: Absorbent underpads,Limit adult briefs,Minimize layers    Activity Interventions: Pressure redistribution bed/mattress(bed type),PT/OT evaluation    Mobility Interventions: HOB 30 degrees or less,Pressure redistribution bed/mattress (bed type),PT/OT evaluation    Nutrition Interventions: Document food/fluid/supplement intake    Friction and Shear Interventions: HOB 30 degrees or less,Minimize layers                Problem: Patient Education: Go to Patient Education Activity  Goal: Patient/Family Education  Outcome: Progressing Towards Goal     Problem: Aspiration - Risk of  Goal: *Absence of aspiration  Outcome: Progressing Towards Goal     Problem: Patient Education: Go to Patient Education Activity  Goal: Patient/Family Education  Outcome: Progressing Towards Goal     Problem: Patient Education: Go to Patient Education Activity  Goal: Patient/Family Education  Outcome: Progressing Towards Goal     Problem: TIA/CVA Stroke: 0-24 hours  Goal: Off Pathway (Use only if patient is Off Pathway)  Outcome: Progressing Towards Goal  Goal: Activity/Safety  Outcome: Progressing Towards Goal  Goal: Consults, if ordered  Outcome: Progressing Towards Goal  Goal: Diagnostic Test/Procedures  Outcome: Progressing Towards Goal  Goal: Nutrition/Diet  Outcome: Progressing Towards Goal  Goal: Discharge Planning  Outcome: Progressing Towards Goal  Goal: Medications  Outcome: Progressing Towards Goal  Goal: Respiratory  Outcome: Progressing Towards Goal  Goal: Treatments/Interventions/Procedures  Outcome: Progressing Towards Goal  Goal: Minimize risk of bleeding post-thrombolytic infusion  Outcome: Progressing Towards Goal  Goal: Monitor for complications post-thrombolytic infusion  Outcome: Progressing Towards Goal  Goal: Psychosocial  Outcome: Progressing Towards Goal  Goal: *Hemodynamically stable  Outcome: Progressing Towards Goal  Goal: *Neurologically stable  Description: Absence of additional neurological deficits    Outcome: Progressing Towards Goal  Goal: *Verbalizes anxiety and depression are reduced or absent  Outcome: Progressing Towards Goal  Goal: *Absence of Signs of Aspiration on Current Diet  Outcome: Progressing Towards Goal  Goal: *Absence of deep venous thrombosis signs and symptoms(Stroke Metric)  Outcome: Progressing Towards Goal  Goal: *Ability to perform ADLs and demonstrates progressive mobility and function  Outcome: Progressing Towards Goal  Goal: *Stroke education started(Stroke Metric)  Outcome: Progressing Towards Goal  Goal: *Dysphagia screen performed(Stroke Metric)  Outcome: Progressing Towards Goal  Goal: *Rehab consulted(Stroke Metric)  Outcome: Progressing Towards Goal     Problem: Ischemic Stroke: Discharge Outcomes  Goal: *Verbalizes anxiety and depression are reduced or absent  Outcome: Progressing Towards Goal  Goal: *Verbalize understanding of risk factor modification(Stroke Metric)  Outcome: Progressing Towards Goal  Goal: *Hemodynamically stable  Outcome: Progressing Towards Goal  Goal: *Absence of aspiration pneumonia  Outcome: Progressing Towards Goal  Goal: *Aware of needed dietary changes  Outcome: Progressing Towards Goal  Goal: *Verbalize understanding of prescribed medications including anti-coagulants, anti-lipid, and/or anti-platelets(Stroke Metric)  Outcome: Progressing Towards Goal  Goal: *Tolerating diet  Outcome: Progressing Towards Goal  Goal: *Aware of follow-up diagnostics related to anticoagulants  Outcome: Progressing Towards Goal  Goal: *Ability to perform ADLs and demonstrates progressive mobility and function  Outcome: Progressing Towards Goal  Goal: *Absence of DVT(Stroke Metric)  Outcome: Progressing Towards Goal  Goal: *Absence of aspiration  Outcome: Progressing Towards Goal  Goal: *Optimal pain control at patient's stated goal  Outcome: Progressing Towards Goal  Goal: *Home safety concerns addressed  Outcome: Progressing Towards Goal  Goal: *Describes available resources and support systems  Outcome: Progressing Towards Goal  Goal: *Verbalizes understanding of activation of EMS(911) for stroke symptoms(Stroke Metric)  Outcome: Progressing Towards Goal  Goal: *Understands and describes signs and symptoms to report to providers(Stroke Metric)  Outcome: Progressing Towards Goal  Goal: *Neurolgocially stable (absence of additional neurological deficits)  Outcome: Progressing Towards Goal  Goal: *Verbalizes importance of follow-up with primary care physician(Stroke Metric)  Outcome: Progressing Towards Goal  Goal: *Smoking cessation discussed,if applicable(Stroke Metric)  Outcome: Progressing Towards Goal  Goal: *Depression screening completed(Stroke Metric)  Outcome: Progressing Towards Goal

## 2022-06-26 ENCOUNTER — APPOINTMENT (OUTPATIENT)
Dept: VASCULAR SURGERY | Age: 87
DRG: 066 | End: 2022-06-26
Attending: PSYCHIATRY & NEUROLOGY
Payer: MEDICARE

## 2022-06-26 PROCEDURE — 92522 EVALUATE SPEECH PRODUCTION: CPT

## 2022-06-26 PROCEDURE — 74011250636 HC RX REV CODE- 250/636: Performed by: INTERNAL MEDICINE

## 2022-06-26 PROCEDURE — 74011250637 HC RX REV CODE- 250/637: Performed by: FAMILY MEDICINE

## 2022-06-26 PROCEDURE — 92612 ENDOSCOPY SWALLOW (FEES) VID: CPT

## 2022-06-26 PROCEDURE — 65270000046 HC RM TELEMETRY

## 2022-06-26 PROCEDURE — 93880 EXTRACRANIAL BILAT STUDY: CPT

## 2022-06-26 PROCEDURE — 74011250637 HC RX REV CODE- 250/637: Performed by: INTERNAL MEDICINE

## 2022-06-26 PROCEDURE — 92610 EVALUATE SWALLOWING FUNCTION: CPT

## 2022-06-26 RX ORDER — SODIUM CHLORIDE, SODIUM LACTATE, POTASSIUM CHLORIDE, CALCIUM CHLORIDE 600; 310; 30; 20 MG/100ML; MG/100ML; MG/100ML; MG/100ML
50 INJECTION, SOLUTION INTRAVENOUS CONTINUOUS
Status: DISCONTINUED | OUTPATIENT
Start: 2022-06-26 | End: 2022-06-27 | Stop reason: HOSPADM

## 2022-06-26 RX ORDER — AMLODIPINE BESYLATE 5 MG/1
5 TABLET ORAL DAILY
Status: DISCONTINUED | OUTPATIENT
Start: 2022-06-26 | End: 2022-06-27 | Stop reason: HOSPADM

## 2022-06-26 RX ADMIN — FAMOTIDINE 20 MG: 20 TABLET ORAL at 09:12

## 2022-06-26 RX ADMIN — SODIUM CHLORIDE, POTASSIUM CHLORIDE, SODIUM LACTATE AND CALCIUM CHLORIDE 50 ML/HR: 600; 310; 30; 20 INJECTION, SOLUTION INTRAVENOUS at 13:24

## 2022-06-26 RX ADMIN — AMLODIPINE BESYLATE 5 MG: 5 TABLET ORAL at 13:24

## 2022-06-26 RX ADMIN — FINASTERIDE 5 MG: 5 TABLET, FILM COATED ORAL at 09:12

## 2022-06-26 RX ADMIN — ASPIRIN 81 MG CHEWABLE TABLET 81 MG: 81 TABLET CHEWABLE at 09:12

## 2022-06-26 RX ADMIN — ATORVASTATIN CALCIUM 40 MG: 40 TABLET, FILM COATED ORAL at 23:09

## 2022-06-26 RX ADMIN — MORPHINE SULFATE 15 MG: 15 TABLET, FILM COATED, EXTENDED RELEASE ORAL at 09:12

## 2022-06-26 RX ADMIN — ENOXAPARIN SODIUM 30 MG: 100 INJECTION SUBCUTANEOUS at 09:12

## 2022-06-26 RX ADMIN — DULOXETINE HYDROCHLORIDE 30 MG: 30 CAPSULE, DELAYED RELEASE ORAL at 09:12

## 2022-06-26 RX ADMIN — MORPHINE SULFATE 15 MG: 15 TABLET, FILM COATED, EXTENDED RELEASE ORAL at 23:09

## 2022-06-26 RX ADMIN — CYCLOBENZAPRINE 5 MG: 10 TABLET, FILM COATED ORAL at 13:27

## 2022-06-26 NOTE — PROGRESS NOTES
Problem: Falls - Risk of  Goal: *Absence of Falls  Description: Document Jose Hathaway Fall Risk and appropriate interventions in the flowsheet. Outcome: Progressing Towards Goal  Note: Fall Risk Interventions:  Mobility Interventions: Bed/chair exit alarm,Communicate number of staff needed for ambulation/transfer,OT consult for ADLs,Patient to call before getting OOB,PT Consult for mobility concerns,PT Consult for assist device competence    Mentation Interventions: Adequate sleep, hydration, pain control,Bed/chair exit alarm,Door open when patient unattended,Evaluate medications/consider consulting pharmacy,Increase mobility,More frequent rounding,Reorient patient,Room close to nurse's station    Medication Interventions: Assess postural VS orthostatic hypotension,Evaluate medications/consider consulting pharmacy,Patient to call before getting OOB    Elimination Interventions: Call light in reach,Patient to call for help with toileting needs,Stay With Me (per policy),Toilet paper/wipes in reach,Toileting schedule/hourly rounds,Urinal in reach              Problem: Patient Education: Go to Patient Education Activity  Goal: Patient/Family Education  Outcome: Progressing Towards Goal     Problem: Pressure Injury - Risk of  Goal: *Prevention of pressure injury  Description: Document Nader Scale and appropriate interventions in the flowsheet.   Outcome: Progressing Towards Goal  Note: Pressure Injury Interventions:  Sensory Interventions: Assess changes in LOC,Discuss PT/OT consult with provider,Float heels,Keep linens dry and wrinkle-free,Minimize linen layers    Moisture Interventions: Absorbent underpads,Limit adult briefs,Minimize layers    Activity Interventions: Pressure redistribution bed/mattress(bed type),PT/OT evaluation    Mobility Interventions: HOB 30 degrees or less,Pressure redistribution bed/mattress (bed type),PT/OT evaluation    Nutrition Interventions: Document food/fluid/supplement intake    Friction and Shear Interventions: HOB 30 degrees or less,Minimize layers                Problem: Patient Education: Go to Patient Education Activity  Goal: Patient/Family Education  Outcome: Progressing Towards Goal     Problem: Aspiration - Risk of  Goal: *Absence of aspiration  Outcome: Progressing Towards Goal     Problem: Patient Education: Go to Patient Education Activity  Goal: Patient/Family Education  Outcome: Progressing Towards Goal     Problem: Patient Education: Go to Patient Education Activity  Goal: Patient/Family Education  Outcome: Progressing Towards Goal     Problem: TIA/CVA Stroke: Day 2 Until Discharge  Goal: Off Pathway (Use only if patient is Off Pathway)  Outcome: Progressing Towards Goal  Goal: Activity/Safety  Outcome: Progressing Towards Goal  Goal: Diagnostic Test/Procedures  Outcome: Progressing Towards Goal  Goal: Nutrition/Diet  Outcome: Progressing Towards Goal  Goal: Discharge Planning  Outcome: Progressing Towards Goal  Goal: Medications  Outcome: Progressing Towards Goal  Goal: Respiratory  Outcome: Progressing Towards Goal  Goal: Treatments/Interventions/Procedures  Outcome: Progressing Towards Goal  Goal: Psychosocial  Outcome: Progressing Towards Goal  Goal: *Verbalizes anxiety and depression are reduced or absent  Outcome: Progressing Towards Goal  Goal: *Absence of aspiration  Outcome: Progressing Towards Goal  Goal: *Absence of deep venous thrombosis signs and symptoms(Stroke Metric)  Outcome: Progressing Towards Goal  Goal: *Optimal pain control at patient's stated goal  Outcome: Progressing Towards Goal  Goal: *Tolerating diet  Outcome: Progressing Towards Goal  Goal: *Ability to perform ADLs and demonstrates progressive mobility and function  Outcome: Progressing Towards Goal  Goal: *Stroke education continued(Stroke Metric)  Outcome: Progressing Towards Goal     Problem: Ischemic Stroke: Discharge Outcomes  Goal: *Verbalizes anxiety and depression are reduced or absent  Outcome: Progressing Towards Goal  Goal: *Verbalize understanding of risk factor modification(Stroke Metric)  Outcome: Progressing Towards Goal  Goal: *Hemodynamically stable  Outcome: Progressing Towards Goal  Goal: *Absence of aspiration pneumonia  Outcome: Progressing Towards Goal  Goal: *Aware of needed dietary changes  Outcome: Progressing Towards Goal  Goal: *Verbalize understanding of prescribed medications including anti-coagulants, anti-lipid, and/or anti-platelets(Stroke Metric)  Outcome: Progressing Towards Goal  Goal: *Tolerating diet  Outcome: Progressing Towards Goal  Goal: *Aware of follow-up diagnostics related to anticoagulants  Outcome: Progressing Towards Goal  Goal: *Ability to perform ADLs and demonstrates progressive mobility and function  Outcome: Progressing Towards Goal  Goal: *Absence of DVT(Stroke Metric)  Outcome: Progressing Towards Goal  Goal: *Absence of aspiration  Outcome: Progressing Towards Goal  Goal: *Optimal pain control at patient's stated goal  Outcome: Progressing Towards Goal  Goal: *Home safety concerns addressed  Outcome: Progressing Towards Goal  Goal: *Describes available resources and support systems  Outcome: Progressing Towards Goal  Goal: *Verbalizes understanding of activation of EMS(911) for stroke symptoms(Stroke Metric)  Outcome: Progressing Towards Goal  Goal: *Understands and describes signs and symptoms to report to providers(Stroke Metric)  Outcome: Progressing Towards Goal  Goal: *Neurolgocially stable (absence of additional neurological deficits)  Outcome: Progressing Towards Goal  Goal: *Verbalizes importance of follow-up with primary care physician(Stroke Metric)  Outcome: Progressing Towards Goal  Goal: *Smoking cessation discussed,if applicable(Stroke Metric)  Outcome: Progressing Towards Goal  Goal: *Depression screening completed(Stroke Metric)  Outcome: Progressing Towards Goal     Problem: Patient Education: Go to Patient Education Activity  Goal: Patient/Family Education  Outcome: Progressing Towards Goal     Problem: Patient Education: Go to Patient Education Activity  Goal: Patient/Family Education  Outcome: Progressing Towards Goal

## 2022-06-26 NOTE — PROGRESS NOTES
[address] Adult  Hospitalist Group                                                                                          Hospitalist Progress Note  Maddi Marley MD  Answering service: [phone] from in house phone        Date of Service:  2022  NAME:  Nick Miles  :  1935  MRN:  [id number]      Admission Summary:   Nick Miles is a 80 y.o. male who presents with dysarthria     Patient with severe contractures, bedbound, poor historian, presents from [facility] with dysarthria, apparently patient had some slurred speech earlier today, last known is unclear, reports that he had \"myself speech\" yesterday, denies any other complaints or problems, patient came in as a code stroke and was requested to be admitted to the hospitalist service. Interval history / Subjective:   Feels \"fair,\" not quite back to his baseline, still some dysarthria  NAD     Assessment & Plan:     Dysarthria, dysphagia- CVA on MRI w/ small acute lacunar infact  CT head nothing acute  Appreciate Neurology recs: Cont ASA, statin; check CUS; PT/OT/ST, then can initiate dc planning  TSH wnl  CRP, ESR wnl  A1c wnl    FEES today per SLP    HTN, new dx?  Not on any BP meds at home  Start norvasc  monitor    Pre-renal azotemia w/o ALESIA  Likely d/t dysphagia and on thickened liquids, and was NPO yesterday  start mIVF for now    BPH  Cont home med    Advanced rheumatoid arthritis, multiple joint deformities, bed-bound at baseline with contractures  Cont home meds    Code status: DNR  Prophylaxis: SCDs  Care Plan discussed with: Patient/Family  Anticipated Disposition: return to LTC  Anticipated Discharge: Less than 24 hours pending FEES, CUS     Hospital Problems  Date Reviewed: 2019          Codes Class Noted POA    CVA (cerebral vascular accident) [facility]) ICD-10-CM: I63.9  ICD-9-CM: 434.91  2022 Unknown              Review of Systems:   Pertinent items are noted in HPI    Vital Signs:    Last 24hrs VS reviewed since prior progress note. Most recent are:  Visit Vitals  BP (!) 154/81 (BP 1 Location: Right upper arm, BP Patient Position: At rest)   Pulse (!) 46   Temp 97.7 °F (36.5 °C)   Resp 18   Wt 42.1 kg (92 lb 13 oz)   SpO2 95%   BMI 12.25 kg/m²         Intake/Output Summary (Last 24 hours) at 6/26/2022 1246  Last data filed at 6/26/2022 1102  Gross per 24 hour   Intake --   Output 200 ml   Net -200 ml        Physical Examination:   I had a face to face encounter with this patient and independently examined them on 6/26/2022 as outlined below:    General: Alert, cooperative, no acute distress    EENT:  EOMI. Anicteric sclerae. dryMM  Resp:  CTA bilaterally, no wheezing or rales. No accessory muscle use  CV:  RRR, No audible murmur  GI:  Soft, Non distended, Non tender  Neurologic:  Alert and oriented x2-3 (knows president, not year), mild dysarthria, moves UE normally, limited ROM b/l LE d/t chronic bedbound and severe contractures  Extremities: No edema or cyanosis  Psych:   Not anxious or agitated  Skin:  No rashes. No jaundice       Data Review:   I personally reviewed: vitals, labs, imaging results, notes    Labs:     Recent Labs     06/25/22  0134 06/24/22  1438   WBC 5.9 5.9   HGB 14.1 14.1   HCT 40.7 41.7    223     Recent Labs     06/25/22  0134 06/24/22  1438    140   K 3.9 3.8    105   CO2 27 29   BUN 10 15   CREA 0.29* 0.47*   GLU 95 111*   CA 8.9 9.0   MG 2.2  --    PHOS 3.2  --      Recent Labs     06/24/22  1438   ALT 17   AP 77   TBILI 0.4   TP 6.8   ALB 3.0*   GLOB 3.8     Recent Labs     06/24/22  1438   INR 1.0   PTP 10.8      No results for input(s): FE, TIBC, PSAT, FERR in the last 72 hours. No results found for: FOL, RBCF   No results for input(s): PH, PCO2, PO2 in the last 72 hours.   Recent Labs     06/25/22  0134 06/24/22  1854   CPK 33* 33*     Lab Results   Component Value Date/Time    Cholesterol, total 178 06/25/2022 01:34 AM    HDL Cholesterol 62 06/25/2022 01:34 AM    LDL, calculated 103.2 (H) 06/25/2022 01:34 AM    Triglyceride 64 06/25/2022 01:34 AM    CHOL/HDL Ratio 2.9 06/25/2022 01:34 AM     No results found for: [facility]  Lab Results   Component Value Date/Time    Color DARK YELLOW 02/12/2022 06:36 PM    Appearance CLEAR 02/12/2022 06:36 PM    Specific gravity 1.021 02/12/2022 06:36 PM    pH (UA) 5.5 02/12/2022 06:36 PM    Protein 30 (A) 02/12/2022 06:36 PM    Glucose Negative 02/12/2022 06:36 PM    Ketone TRACE (A) 02/12/2022 06:36 PM    Bilirubin Negative 02/12/2022 06:36 PM    Urobilinogen 0.2 02/12/2022 06:36 PM    Nitrites Negative 02/12/2022 06:36 PM    Leukocyte Esterase SMALL (A) 02/12/2022 06:36 PM    Epithelial cells FEW 02/12/2022 06:36 PM    Bacteria Negative 02/12/2022 06:36 PM    WBC 5-10 02/12/2022 06:36 PM    RBC 5-10 02/12/2022 06:36 PM       Medications Reviewed:     Current Facility-Administered Medications   Medication Dose Route Frequency    DULoxetine (CYMBALTA) capsule 30 mg  30 mg Oral DAILY    atorvastatin (LIPITOR) tablet 40 mg  40 mg Oral QHS    enoxaparin (LOVENOX) injection 30 mg  30 mg SubCUTAneous Q24H    cyclobenzaprine (FLEXERIL) tablet 5 mg  5 mg Oral TID PRN    finasteride (PROSCAR) tablet 5 mg  5 mg Oral DAILY    morphine CR (MS CONTIN) tablet 15 mg  15 mg Oral Q12H    acetaminophen (TYLENOL) tablet 650 mg  650 mg Oral Q4H PRN    Or    acetaminophen (TYLENOL) solution 650 mg  650 mg Per NG tube Q4H PRN    Or    acetaminophen (TYLENOL) suppository 650 mg  650 mg Rectal Q4H PRN    aspirin chewable tablet 81 mg  81 mg Oral DAILY    famotidine (PEPCID) tablet 20 mg  20 mg Oral Q24H     ______________________________________________________________________  EXPECTED LENGTH OF STAY: - - -  ACTUAL LENGTH OF STAY:          2                 Bharat Martinez MD

## 2022-06-26 NOTE — PROGRESS NOTES
Bedside and Verbal shift change report given to [employer] (oncoming nurse) by Bernard Sloan RN (offgoing nurse). Report included the following information SBAR, Kardex, Intake/Output, MAR, Recent Results and Med Rec Status.

## 2022-06-26 NOTE — PROGRESS NOTES
Problem: Dysphagia (Adult)  Goal: *Acute Goals and Plan of Care (Insert Text)  Description: Speech Pathology Goals  Initiated 6/26/2022  1. Patient will participate in imaging to objectively assess safety of the swallow within 7 days. MET 6/26/2022  2. Patient will tolerate soft and bite sized diet/mildly thick liquids with no difficulties or adverse effects within 7 days. 6/26/2022 1238 by Alex Maldonado, SLP  Outcome: Progressing Towards Goal     Speech Language Pathology  Flexible Endoscopic Evaluation of Swallowing-FEES  Patient: Abiola Mcconnell (16 y.o. male)  Date: 6/26/2022  Primary Diagnosis: CVA (cerebral vascular accident) [facility]) [I63.9]        Precautions:   Fall,Skin    ASSESSMENT :  Based on the objective data described below, the patient presents with moderate pharyngeal dysphagia. Oral phase characterized by adequate bolus formation and control for liquids and puree, with slightly prolonged mastication of solid. Pharyngeal phase characterized by incomplete laryngeal vestibule closure and reduced pharyngeal squeeze evidenced by incomplete white out resulting in penetration presumed during the swallow with subsequent aspiration after the swallow with thin liquids. Aspiration was silent, cued cough was ineffective in clearing aspirate. With mildly thick liquids and purees, patient able to maintain adequate laryngeal closure resulting in no penetration or aspiration. With solids, patient with adequate airway protection however with mild to moderate pharyngeal residue requiring liquid washes to clear. No retrograde flow appreciated from UES. Suspect that moderate pharyngeal dysphagia is due to acute pontine infarct impacting strength and sensation of the swallow. Given improved swallow safety with mildly thick liquids, recommend soft and bite sized diet/mildly thick liquids with precautions as outlined below.  Given silent aspiration, patient will require repeat imaging prior to diet liberalization. SLP to continue to follow. Patient will benefit from skilled intervention to address the above impairments. Patient's rehabilitation potential is considered to be Fair     Images taken from FEES:    Aspiration with thin liquids      No aspiration/penetrarion with mildly thick liquids      Reside with solid     PLAN :  Recommendations and Planned Interventions:  -- soft and bite sized diet/mildly thick (nectar) liquids  -- alternate bites and sips  -- upright for all PO   -- straws okay   -- SLP to continue to follow for speech and swallowing   Frequency/Duration: Patient will be followed by speech-language pathology 3 times a week to address goals. Discharge Recommendations: To Be Determined     SUBJECTIVE:   Patient stated I could do my Radhate Scheuermann while waiting for FEES to be set up. OBJECTIVE:   History reviewed. No pertinent past medical history. History reviewed. No pertinent surgical history.   Prior Level of Function/Home Situation:   Home Situation  Home Environment: [address] [place] [address] Name: [id number]: Other Family Member(s)  Patient Expects to be Discharged to[de-identified] [address] [facility]  Diet prior to admission: presumed regular/thin  Current Diet:  easy to chew/thin     Cognitive and Communication Status:  Neurologic State: Alert,Confused,Drowsy  Orientation Level: Oriented to person,Oriented to place,Disoriented to situation,Disoriented to time  Cognition: Decreased attention/concentration,Follows commands  Perception: Appears intact  Perseveration: No perseveration noted  Safety/Judgement: Awareness of environment    History/indication for procedure: acute pontine infarct  Lidocaine used: No  Nostril used: right  Scope Used: Ambu disposable scope  Feeding Tube Present in Nare: No  Adverse Reaction: No  Respiratory status: stable on room air        Part I:  Anatomy:  Oral Assessment  Labial: No impairment  Dentition: Limited  Oral Hygiene: dry oral mucosa  Lingual: Decreased rate  Velum: No impairment  Mandible: No impairment    General Comments:      Palate:   WFL   Base of tongue:   Impaired: cobblestone texture to BOT   Valleculae:   WFL   Epiglottis:   Impaired: small, curved epiglottis - obstructing view of airway   Arytenoids:   Impaired: fulness of L arytenoid   False vocal folds:   WFL   Vocal folds:   Impaired: bilateral protuberances on medial vocal folds Pyriform sinus:   WFL         Part II:  Laryngeal Function:    Adduction:   Full   Abduction:   Full   Arytenoid movement:   Symmetric Vocal quality:   Hoarse         Part III:  Secretions:    [place] Secretion Rating (0-7): 0     Location:  0 - Nil significant pooled secretions in pyriform fossae or laryngeal vestibule Amount:   0 - Nil significant pooled secretions in pyriform fossae (0-20%) Response*:  0 - Secretions in pyriform fossae or laryngeal vestibule effectively cleared   Comments (e.g., quality/texture/color):      *Normal airway responses in the pharynx or laryngeal vestibule may include spontaneous coughing, throat clearing, and/or swallowing        Part IV:  Swallow Trials:    Subjective comments regarding oral phase of swallow: subjectively patient with adequate oral phase for liquids and puree, with slightly prolonged mastication of solid. Comments regarding esophageal phase of swallow: No retrograde flow appreciated from UES.      Consistency: Ice chips and Thin liquid  Position of Bolus Pre-Swallow: Valleculae  Four States Pharyngeal Residue Severity Rating Scale: Valleculae residue: 2 - trace; 1-5%, trace coating of the mucosa and Pyriform sinus residue: 2 - trace; 1-5%, trace coating of the mucosa  Spontaneously Cleared: No  Penetration: Yes  Aspiration: Yes  Response: No response and Cough ineffective  Rosenbek Penetration-Aspiration Scale: 8 - Material enters the airway, passes below the vocal folds, and no effort is made to eject    Consistency: Mildly thick liquid  Position of Bolus Pre-Swallow: Presumed base of tongue  Leni Pharyngeal Residue Severity Rating Scale: Valleculae residue: 1 - none; 0%, no residue and Pyriform sinus residue: 1 - none; 0%, no residue  Spontaneously Cleared: n/a  Penetration: No  Aspiration: No  Response: n/a  Rosenbek Penetration-Aspiration Scale: 1 - Material does not enter the airway    Consistency: Puree  Position of Bolus Pre-Swallow: Presumed base of tongue  Leni Pharyngeal Residue Severity Rating Scale: Valleculae residue: 1 - none; 0%, no residue and Pyriform sinus residue: 1 - none; 0%, no residue  Spontaneously Cleared: n/a  Penetration: No  Aspiration: No  Response: n/a  Rosenbek Penetration-Aspiration Scale: 1 - Material does not enter the airway    Consistency: Solid  Position of Bolus Pre-Swallow: Presumed base of tongue  Gaston Pharyngeal Residue Severity Rating Scale: Valleculae residue: 4 - moderate; 25-50%, epiglottic ligament covered and Pyriform sinus residue: 3 - mild; 5-25%, up wall to quarter full  Spontaneously Cleared: No  Penetration: No  Aspiration: No  Response: n/a  Rosenbek Penetration-Aspiration Scale: 1 - Material does not enter the airway    Dysphagia Severity Rating:   Oral phase: Mild  Pharyngeal phase: Moderate      NOMS:   The NOMS functional outcome measure was used to quantify this patient's level of swallowing impairment. Based on the NOMS, the patient was determined to be at level 4 for swallow function         NOMS Swallowing Levels:  Level 1 (CN): NPO  Level 2 (CM): NPO but takes consistency in therapy  Level 3 (CL): Takes less than 50% of nutrition p.o. and continues with nonoral feedings; and/or safe with mod cues; and/or max diet restriction  Level 4 (CK):  Safe swallow but needs mod cues; and/or mod diet restriction; and/or still requires some nonoral feeding/supplements  Level 5 (CJ): Safe swallow with min diet restriction; and/or needs min cues  Level 6 (CI): Independent with p.o.; rare cues; usually self cues; may need to avoid some foods or needs extra time  Level 7 [facility]): Independent for all p.o.  DEVENDRA. (2003). National Outcomes Measurement System (NOMS): Adult Speech-Language Pathology User's Guide. Pain:  Pain Scale 1: Numeric (0 - 10)  Pain Intensity 1: 0       COMMUNICATION/EDUCATION:   Patient was educated regarding His deficit(s) of aspiration as this relates to His diagnosis of pontine infarct. He demonstrated Fair understanding as evidenced by verbalization of understanding. The patient's plan of care including findings from FEES, recommendations, planned interventions, and recommended diet changes were discussed with: Registered nurse. Patient/family have participated as able in goal setting and plan of care. Patient/family agree to work toward stated goals and plan of care.     Thank you for this referral.  Manjinder Blount M.S. [facility] Pathologist     Time Calculation: 21 mins

## 2022-06-27 ENCOUNTER — APPOINTMENT (OUTPATIENT)
Dept: NON INVASIVE DIAGNOSTICS | Age: 87
DRG: 066 | End: 2022-06-27
Attending: FAMILY MEDICINE
Payer: MEDICARE

## 2022-06-27 VITALS
HEART RATE: 62 BPM | HEIGHT: 73 IN | RESPIRATION RATE: 15 BRPM | SYSTOLIC BLOOD PRESSURE: 155 MMHG | BODY MASS INDEX: 12.48 KG/M2 | WEIGHT: 94.14 LBS | TEMPERATURE: 98.1 F | OXYGEN SATURATION: 94 % | DIASTOLIC BLOOD PRESSURE: 73 MMHG

## 2022-06-27 LAB
ANION GAP SERPL CALC-SCNC: 6 MMOL/L (ref 5–15)
BUN SERPL-MCNC: 14 MG/DL (ref 6–20)
BUN/CREAT SERPL: 42 (ref 12–20)
CALCIUM SERPL-MCNC: 8.6 MG/DL (ref 8.5–10.1)
CHLORIDE SERPL-SCNC: 109 MMOL/L (ref 97–108)
CO2 SERPL-SCNC: 26 MMOL/L (ref 21–32)
COMMENT, HOLDF: NORMAL
CREAT SERPL-MCNC: 0.33 MG/DL (ref 0.7–1.3)
ECHO AR MAX VEL PISA: 3.5 M/S
ECHO AV AREA PEAK VELOCITY: 2 CM2
ECHO AV AREA PEAK VELOCITY: 2.2 CM2
ECHO AV AREA PEAK VELOCITY: 2.4 CM2
ECHO AV AREA PEAK VELOCITY: 2.6 CM2
ECHO AV AREA VTI: 2.2 CM2
ECHO AV AREA/BSA VTI: 1.4 CM2/M2
ECHO AV MEAN GRADIENT: 6 MMHG
ECHO AV MEAN VELOCITY: 1.2 M/S
ECHO AV PEAK GRADIENT: 12 MMHG
ECHO AV PEAK GRADIENT: 14 MMHG
ECHO AV PEAK VELOCITY: 1.8 M/S
ECHO AV PEAK VELOCITY: 1.9 M/S
ECHO AV REGURGITANT PHT: 786.2 MILLISECOND
ECHO AV VTI: 37.7 CM
ECHO LA DIAMETER INDEX: 1.86 CM/M2
ECHO LA DIAMETER: 2.9 CM
ECHO LA VOL 2C: 22 ML (ref 18–58)
ECHO LA VOL 4C: 26 ML (ref 18–58)
ECHO LA VOLUME AREA LENGTH: 31 ML
ECHO LA VOLUME INDEX A2C: 14 ML/M2 (ref 16–34)
ECHO LA VOLUME INDEX A4C: 17 ML/M2 (ref 16–34)
ECHO LA VOLUME INDEX AREA LENGTH: 20 ML/M2 (ref 16–34)
ECHO LV E' LATERAL VELOCITY: 6 CM/S
ECHO LV E' SEPTAL VELOCITY: 3 CM/S
ECHO LV FRACTIONAL SHORTENING: 24 % (ref 28–44)
ECHO LV INTERNAL DIMENSION DIASTOLE INDEX: 2.44 CM/M2
ECHO LV INTERNAL DIMENSION DIASTOLIC: 3.8 CM (ref 4.2–5.9)
ECHO LV INTERNAL DIMENSION SYSTOLIC INDEX: 1.86 CM/M2
ECHO LV INTERNAL DIMENSION SYSTOLIC: 2.9 CM
ECHO LV IVSD: 2 CM (ref 0.6–1)
ECHO LV MASS 2D: 240.3 G (ref 88–224)
ECHO LV MASS INDEX 2D: 154.1 G/M2 (ref 49–115)
ECHO LV POSTERIOR WALL DIASTOLIC: 1.2 CM (ref 0.6–1)
ECHO LV RELATIVE WALL THICKNESS RATIO: 0.63
ECHO LVOT AREA: 3.1 CM2
ECHO LVOT AV VTI INDEX: 0.72
ECHO LVOT DIAM: 2 CM
ECHO LVOT MEAN GRADIENT: 4 MMHG
ECHO LVOT PEAK GRADIENT: 6 MMHG
ECHO LVOT PEAK GRADIENT: 9 MMHG
ECHO LVOT PEAK VELOCITY: 1.2 M/S
ECHO LVOT PEAK VELOCITY: 1.5 M/S
ECHO LVOT STROKE VOLUME INDEX: 54.3 ML/M2
ECHO LVOT SV: 84.8 ML
ECHO LVOT VTI: 27 CM
ECHO MV A VELOCITY: 1.14 M/S
ECHO MV AREA PHT: 2.8 CM2
ECHO MV E DECELERATION TIME (DT): 273.7 MS
ECHO MV E VELOCITY: 0.69 M/S
ECHO MV E/A RATIO: 0.61
ECHO MV E/E' LATERAL: 11.5
ECHO MV E/E' RATIO (AVERAGED): 17.25
ECHO MV E/E' SEPTAL: 23
ECHO MV PRESSURE HALF TIME (PHT): 79.4 MS
ECHO RV FREE WALL PEAK S': 7 CM/S
ECHO RV INTERNAL DIMENSION: 2.7 CM
ECHO RV TAPSE: 1.4 CM (ref 1.7–?)
ECHO TV REGURGITANT MAX VELOCITY: 2.19 M/S
ECHO TV REGURGITANT PEAK GRADIENT: 19 MMHG
GLUCOSE SERPL-MCNC: 108 MG/DL (ref 65–100)
LEFT CCA DIST DIAS: 10.9 CM/S
LEFT CCA DIST SYS: 61.4 CM/S
LEFT CCA PROX DIAS: 13.1 CM/S
LEFT CCA PROX SYS: 71.3 CM/S
LEFT ECA DIAS: 0 CM/S
LEFT ECA SYS: 47.1 CM/S
LEFT ICA DIST DIAS: 7.1 CM/S
LEFT ICA DIST SYS: 33.5 CM/S
LEFT ICA MID DIAS: 13 CM/S
LEFT ICA MID SYS: 47.9 CM/S
LEFT ICA PROX DIAS: 7.8 CM/S
LEFT ICA PROX SYS: 52.3 CM/S
LEFT ICA/CCA SYS: 0.9 NO UNITS
LEFT VERTEBRAL DIAS: 8.7 CM/S
LEFT VERTEBRAL SYS: 72.4 CM/S
POTASSIUM SERPL-SCNC: 3.6 MMOL/L (ref 3.5–5.1)
RIGHT CCA DIST DIAS: 10.3 CM/S
RIGHT CCA DIST SYS: 44.2 CM/S
RIGHT CCA PROX DIAS: 11.6 CM/S
RIGHT CCA PROX SYS: 83.3 CM/S
RIGHT ECA DIAS: 0 CM/S
RIGHT ECA SYS: 45.5 CM/S
RIGHT ICA DIST DIAS: 6.6 CM/S
RIGHT ICA DIST SYS: 27.9 CM/S
RIGHT ICA MID DIAS: 9.2 CM/S
RIGHT ICA MID SYS: 52.6 CM/S
RIGHT ICA PROX DIAS: 9.2 CM/S
RIGHT ICA PROX SYS: 33.4 CM/S
RIGHT ICA/CCA SYS: 1.2 NO UNITS
RIGHT VERTEBRAL DIAS: 8.6 CM/S
RIGHT VERTEBRAL SYS: 52.3 CM/S
SAMPLES BEING HELD,HOLD: NORMAL
SODIUM SERPL-SCNC: 141 MMOL/L (ref 136–145)

## 2022-06-27 PROCEDURE — 92526 ORAL FUNCTION THERAPY: CPT

## 2022-06-27 PROCEDURE — 80048 BASIC METABOLIC PNL TOTAL CA: CPT

## 2022-06-27 PROCEDURE — 74011250637 HC RX REV CODE- 250/637: Performed by: INTERNAL MEDICINE

## 2022-06-27 PROCEDURE — 36415 COLL VENOUS BLD VENIPUNCTURE: CPT

## 2022-06-27 PROCEDURE — 93306 TTE W/DOPPLER COMPLETE: CPT

## 2022-06-27 PROCEDURE — 74011250636 HC RX REV CODE- 250/636: Performed by: INTERNAL MEDICINE

## 2022-06-27 PROCEDURE — 74011250637 HC RX REV CODE- 250/637: Performed by: FAMILY MEDICINE

## 2022-06-27 RX ORDER — AMLODIPINE BESYLATE 5 MG/1
5 TABLET ORAL DAILY
Qty: 30 TABLET | Refills: 0 | Status: SHIPPED | OUTPATIENT
Start: 2022-06-28 | End: 2022-07-28

## 2022-06-27 RX ORDER — GUAIFENESIN 100 MG/5ML
81 LIQUID (ML) ORAL DAILY
Qty: 30 TABLET | Refills: 0 | Status: SHIPPED | OUTPATIENT
Start: 2022-06-28 | End: 2022-07-28

## 2022-06-27 RX ORDER — ATORVASTATIN CALCIUM 40 MG/1
40 TABLET, FILM COATED ORAL
Qty: 30 TABLET | Refills: 0 | Status: SHIPPED | OUTPATIENT
Start: 2022-06-27 | End: 2022-07-27

## 2022-06-27 RX ORDER — FAMOTIDINE 20 MG/1
20 TABLET, FILM COATED ORAL EVERY 24 HOURS
Qty: 30 TABLET | Refills: 0 | Status: SHIPPED | OUTPATIENT
Start: 2022-06-28 | End: 2022-07-28

## 2022-06-27 RX ADMIN — AMLODIPINE BESYLATE 5 MG: 5 TABLET ORAL at 09:39

## 2022-06-27 RX ADMIN — ENOXAPARIN SODIUM 30 MG: 100 INJECTION SUBCUTANEOUS at 09:40

## 2022-06-27 RX ADMIN — DULOXETINE HYDROCHLORIDE 30 MG: 30 CAPSULE, DELAYED RELEASE ORAL at 09:40

## 2022-06-27 RX ADMIN — MORPHINE SULFATE 15 MG: 15 TABLET, FILM COATED, EXTENDED RELEASE ORAL at 09:39

## 2022-06-27 RX ADMIN — ACETAMINOPHEN 650 MG: 325 TABLET ORAL at 07:09

## 2022-06-27 RX ADMIN — FAMOTIDINE 20 MG: 20 TABLET ORAL at 09:40

## 2022-06-27 RX ADMIN — ASPIRIN 81 MG CHEWABLE TABLET 81 MG: 81 TABLET CHEWABLE at 09:39

## 2022-06-27 RX ADMIN — FINASTERIDE 5 MG: 5 TABLET, FILM COATED ORAL at 09:40

## 2022-06-27 NOTE — PROGRESS NOTES
Problem: Dysphagia (Adult)  Goal: *Acute Goals and Plan of Care (Insert Text)  Description: Speech Pathology Goals  Initiated 6/26/2022  1. Patient will participate in imaging to objectively assess safety of the swallow within 7 days. MET 6/26/2022  2. Patient will tolerate soft and bite sized diet/mildly thick liquids with no difficulties or adverse effects within 7 days. Outcome: Progressing Towards Goal   SPEECH LANGUAGE PATHOLOGY DYSPHAGIA TREATMENT  Patient: Helio Gonzalez (13 y.o. male)  Date: 6/27/2022  Diagnosis: [facility] [I63.9] <principal problem not specified>       Precautions:  Fall,Skin    ASSESSMENT:  Patient tolerating soft and bite sized diet and mildly thick liquids s/p FEES completion yesterday, 6/26. Patient with moderate pharyngeal dysphagia. He had silent aspiration of thin liquids on FEES and therefore will require repeat imaging prior to diet advancement. No overt s/s aspiration with straw sips of mildly thick liquids. PLAN:  Recommendations and Planned Interventions:  -- soft and bite sized diet/ mildly thick liquids. Straw OK  -- alternate liquid/solid  -- meds in applesauce  -- ST at the next level of care (LTC at [facility]). Patient will need repeat imaging prior to diet advancement given silent aspiration of thins on FEES    Patient continues to benefit from skilled intervention to address the above impairments. Continue treatment per established plan of care.   Discharge Recommendations:  Skilled Nursing Facility     SUBJECTIVE:   Patient stated Am I going home today>    OBJECTIVE:   Cognitive and Communication Status:  Neurologic State: Alert  Orientation Level: Oriented to person,Oriented to place,Disoriented to situation,Disoriented to time  Cognition: Decreased attention/concentration,Follows commands  Perception: Appears intact  Perseveration: No perseveration noted  Safety/Judgement: Awareness of environment  Dysphagia Treatment:  Oral Assessment:  Oral Assessment  Labial: No impairment  Dentition: Limited  P.O. Trials:  Patient Position: up in bed  Vocal quality prior to P.O.: No impairment  Consistency Presented: Nectar thick liquid;Puree  How Presented: SLP-fed/presented;Straw;Spoon     Bolus Acceptance: No impairment  Bolus Formation/Control: No impairment     Propulsion: No impairment  Oral Residue: None           Pharyngeal Phase Severity : Moderate       Pain:  Pain Scale 1: Numeric (0 - 10)  Pain Intensity 1: 0       After treatment:   Patient left in no apparent distress in bed, Call bell within reach, and Nursing notified    COMMUNICATION/EDUCATION:     The patient's plan of care including recommendations, planned interventions, and recommended diet changes were discussed with: Registered nurse.      VICENTE Rivers  Time Calculation: 21 mins

## 2022-06-27 NOTE — WOUND CARE
WOCN Note:     New consult for blister to feet and legs. Chart shows:  Admitted on 6/24/22. Admitted for CVA. History of RA, contractures. Assessment:   Appropriately conversational and reports no pain. 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 Able to turn independently onto right. Wearing briefs and cleaned of small solid stool. Surface: carina gel mattress    Bilateral foot deformities; heels intact and without erythema. Heels offloaded with pillows. Buttocks and sacrum intact without erythema; Sacral foam applied. Scattered ruptured bullae to lower legs that are dry and left open to air    Left plantar foot ruptured bullae   Moist pink base with deflated dry burgundy on one side  1.5 x 3 cm  Petrolatum applied    Wound Recommendations:    Bacitracin daily to left plantar foot. PI Prevention:  Turn/reposition approximately every 2 hours  Offload heels with heels hanging off end of pillow at all times while in bed. Sacral Foam dressing: lift to assess regularly; change as needed. Discontinue if incontinence is frequently soiling dressing. Hydraguard cream to buttocks and sacrum daily and as needed with incontinence care    Transition of Care: notes indicate for discharge today.     SHAMAR BowerN, RN, [facility] [place] [facility] [place]  Certified Wound, Ostomy, Continence Nurse  office [phone]  Available via [employer]

## 2022-06-27 NOTE — PROGRESS NOTES
I have reviewed discharge instructions with the patient. The patient verbalized understanding. Bedside RN performed patient education and medication education. Discharge concerns initiated and discussed with patient, including clarification on \"who\" assists the patient at their home and instructions for when the home going patient should call their provider after discharge. Opportunity for questions and clarification was provided. Patient receptive to education: YES  Patient stated: I understnd  Barriers to Education: NA  Diagnosis Education given:  YES    Length of stay: 3  Expected Day of Discharge: 3  Ask if they have \"Help at Home\" & add to white board?   YES    Education Day #: 3    Medication Education Given:  YES  M in the box Medication name: amlodipine    Pt aware of HCAHPS survey: YES          Stroke Education documented in Patient Education: YES  Core Measures Documented in Connect Care:  Risk Factors: YES  Warning signs of stroke: YES  When to Activate 911: YES  Medication Education for Risk Factors: YES  Smoking cessation if applicable: YES  Written Education Given:  YES    Discharge NIH Completed: YES  Score: 0    BRAINS: YES    Follow Up Appointment Made: NO  Date/Time if applicable: NA

## 2022-06-27 NOTE — PROGRESS NOTES
Transition of Care Plan  1. RUR- 14%   2. DISPOSITION: [address] [facility] Today  3. F/U with PCP/Specialist    4. Transport: BLS   -3:30 pm    Transition of Care Plan to SNF/Rehab    SNF/Rehab Transition:  Patient has been accepted to [facility] and meets criteria for admission. Patient will transported by [facility] and expected to leave at 3:30 pm    Communication to Patient/Family:  Met with patient and (identified care giver) and they are agreeable to the transition plan. Communication to SNF/Rehab:  Bedside RN, has been notified to update the transition plan to the facility and call report (phone number [phone]). Discharge information has been updated on the AVS.     Discharge instructions to be fax'd to facility at    Nursing Please include all hard scripts for controlled substances, med rec and dc summary, and AVS in packet.      Reviewed and confirmed with facility, [facility], can manage the patient care needs for the following:     SNF/Rehab Transition:    Reviewed and confirmed with facility, [facility] they can manage the patient care needs for the following:     Priyank Choe with (X) only those applicable:    Medication:  [x]  Medications will be available at the facility  []  IV Antibiotics  []  Controlled Substance - hard copy to be sent with patient   []  Weekly Labs   Documents:  [] Hard RX  [] MAR  [] Kardex  [] AVS  []Transfer Summary  []Discharge   Equipment:  []  CPAP/BiPAP  []  Wound Vacuum  []  Bowen or Urinary Device  []  PICC/Central Line  []  Nebulizer  []  Ventilator   Treatment:  []Isolation (for MRSA, VRE, etc.)  []Surgical Drain Management  []Tracheostomy Care  []Dressing Changes  []Dialysis with transportation and chair time  []PEG Care  []Oxygen  []Daily Weights for Heart Failure   Dietary:  []Any diet limitations  []Tube Feedings   []Total Parenteral Management (TPN)   Eligible for Medicaid Long Term Services and Supports  Yes:  [] Eligible for medical assistance or will become eligible within 180 days and UAI completed. [] Provider/Patient and/or support system has requested screening. [] UAI copy provided to patient or responsible party  [] UAI unavailable at discharge will send once processed to SNF provider. [] UAI unavailable at discharged mailed to patient  No:   [] Private pay and is not financially eligible for Medicaid within the next 180 days. [] Reside out-of-state. [] A residents of a state owned/operated facility that is licensed  by [facility] and [facility] Services or [facility]  [] Enrollment in [facility] hospice services  [] [address] [place] [address]  [] Patient /Family declines to have screening completed or provide financial information for screening     Financial Resources:  Medicaid    [] Initiated and application pending   [] Full coverage     Advanced Care Plan:  [x]Surrogate Decision Maker of Care  [x]POA  [x]Communicated Code Status (DNR)    Other       The program assesses the family and/or caregiver's readiness, willingness, and ability to provide or support and self-management activities for the patient as needed. Medicare pt has received, reviewed, and signed 2nd IM letter informing them of their right to appeal the discharge. Signed copy has been placed on pt bedside chart.       CASEY Underwood/NEGAR  Care Management

## 2022-06-27 NOTE — PROGRESS NOTES
Problem: Falls - Risk of  Goal: *Absence of Falls  Description: Document Cindy Sunita Fall Risk and appropriate interventions in the flowsheet. Outcome: Resolved/Met  Note: Fall Risk Interventions:  Mobility Interventions: Bed/chair exit alarm,Patient to call before getting OOB    Mentation Interventions: Bed/chair exit alarm,Door open when patient unattended    Medication Interventions: Patient to call before getting OOB    Elimination Interventions: Bed/chair exit alarm,Call light in reach,Patient to call for help with toileting needs              Problem: Patient Education: Go to Patient Education Activity  Goal: Patient/Family Education  Outcome: Resolved/Met     Problem: Pressure Injury - Risk of  Goal: *Prevention of pressure injury  Description: Document Nader Scale and appropriate interventions in the flowsheet. Outcome: Resolved/Met  Note: Pressure Injury Interventions:  Sensory Interventions: Assess changes in LOC,Minimize linen layers,Keep linens dry and wrinkle-free    Moisture Interventions: Apply protective barrier, creams and emollients,Limit adult briefs,Minimize layers    Activity Interventions: Increase time out of bed,Pressure redistribution bed/mattress(bed type)    Mobility Interventions: Pressure redistribution bed/mattress (bed type),Turn and reposition approx.  every two hours(pillow and wedges)    Nutrition Interventions: Document food/fluid/supplement intake    Friction and Shear Interventions: Lift sheet,Minimize layers                Problem: Patient Education: Go to Patient Education Activity  Goal: Patient/Family Education  Outcome: Resolved/Met     Problem: Aspiration - Risk of  Goal: *Absence of aspiration  Outcome: Resolved/Met     Problem: Patient Education: Go to Patient Education Activity  Goal: Patient/Family Education  Outcome: Resolved/Met     Problem: Patient Education: Go to Patient Education Activity  Goal: Patient/Family Education  Outcome: Resolved/Met     Problem: TIA/CVA Stroke: 0-24 hours  Goal: Off Pathway (Use only if patient is Off Pathway)  Outcome: Resolved/Met  Goal: Activity/Safety  Outcome: Resolved/Met  Goal: Consults, if ordered  Outcome: Resolved/Met  Goal: Diagnostic Test/Procedures  Outcome: Resolved/Met  Goal: Nutrition/Diet  Outcome: Resolved/Met  Goal: Discharge Planning  Outcome: Resolved/Met  Goal: Medications  Outcome: Resolved/Met  Goal: Respiratory  Outcome: Resolved/Met  Goal: Treatments/Interventions/Procedures  Outcome: Resolved/Met  Goal: Minimize risk of bleeding post-thrombolytic infusion  Outcome: Resolved/Met  Goal: Monitor for complications post-thrombolytic infusion  Outcome: Resolved/Met  Goal: Psychosocial  Outcome: Resolved/Met  Goal: *Hemodynamically stable  Outcome: Resolved/Met  Goal: *Neurologically stable  Description: Absence of additional neurological deficits    Outcome: Resolved/Met  Goal: *Verbalizes anxiety and depression are reduced or absent  Outcome: Resolved/Met  Goal: *Absence of Signs of Aspiration on Current Diet  Outcome: Resolved/Met  Goal: *Absence of deep venous thrombosis signs and symptoms(Stroke Metric)  Outcome: Resolved/Met  Goal: *Ability to perform ADLs and demonstrates progressive mobility and function  Outcome: Resolved/Met  Goal: *Stroke education started(Stroke Metric)  Outcome: Resolved/Met  Goal: *Dysphagia screen performed(Stroke Metric)  Outcome: Resolved/Met  Goal: *Rehab consulted(Stroke Metric)  Outcome: Resolved/Met     Problem: TIA/CVA Stroke: Day 2 Until Discharge  Goal: Off Pathway (Use only if patient is Off Pathway)  Outcome: Resolved/Met  Goal: Activity/Safety  Outcome: Resolved/Met  Goal: Diagnostic Test/Procedures  Outcome: Resolved/Met  Goal: Nutrition/Diet  Outcome: Resolved/Met  Goal: Discharge Planning  Outcome: Resolved/Met  Goal: Medications  Outcome: Resolved/Met  Goal: Respiratory  Outcome: Resolved/Met  Goal: Treatments/Interventions/Procedures  Outcome: Resolved/Met  Goal: Psychosocial  Outcome: Resolved/Met  Goal: *Verbalizes anxiety and depression are reduced or absent  Outcome: Resolved/Met  Goal: *Absence of aspiration  Outcome: Resolved/Met  Goal: *Absence of deep venous thrombosis signs and symptoms(Stroke Metric)  Outcome: Resolved/Met  Goal: *Optimal pain control at patient's stated goal  Outcome: Resolved/Met  Goal: *Tolerating diet  Outcome: Resolved/Met  Goal: *Ability to perform ADLs and demonstrates progressive mobility and function  Outcome: Resolved/Met  Goal: *Stroke education continued(Stroke Metric)  Outcome: Resolved/Met     Problem: Ischemic Stroke: Discharge Outcomes  Goal: *Verbalizes anxiety and depression are reduced or absent  Outcome: Resolved/Met  Goal: *Verbalize understanding of risk factor modification(Stroke Metric)  Outcome: Resolved/Met  Goal: *Hemodynamically stable  Outcome: Resolved/Met  Goal: *Absence of aspiration pneumonia  Outcome: Resolved/Met  Goal: *Aware of needed dietary changes  Outcome: Resolved/Met  Goal: *Verbalize understanding of prescribed medications including anti-coagulants, anti-lipid, and/or anti-platelets(Stroke Metric)  Outcome: Resolved/Met  Goal: *Tolerating diet  Outcome: Resolved/Met  Goal: *Aware of follow-up diagnostics related to anticoagulants  Outcome: Resolved/Met  Goal: *Ability to perform ADLs and demonstrates progressive mobility and function  Outcome: Resolved/Met  Goal: *Absence of DVT(Stroke Metric)  Outcome: Resolved/Met  Goal: *Absence of aspiration  Outcome: Resolved/Met  Goal: *Optimal pain control at patient's stated goal  Outcome: Resolved/Met  Goal: *Home safety concerns addressed  Outcome: Resolved/Met  Goal: *Describes available resources and support systems  Outcome: Resolved/Met  Goal: *Verbalizes understanding of activation of EMS(911) for stroke symptoms(Stroke Metric)  Outcome: Resolved/Met  Goal: *Understands and describes signs and symptoms to report to providers(Stroke Metric)  Outcome: Resolved/Met  Goal: *Neurolgocially stable (absence of additional neurological deficits)  Outcome: Resolved/Met  Goal: *Verbalizes importance of follow-up with primary care physician(Stroke Metric)  Outcome: Resolved/Met  Goal: *Smoking cessation discussed,if applicable(Stroke Metric)  Outcome: Resolved/Met  Goal: *Depression screening completed(Stroke Metric)  Outcome: Resolved/Met     Problem: Patient Education: Go to Patient Education Activity  Goal: Patient/Family Education  Outcome: Resolved/Met

## 2022-06-27 NOTE — PROGRESS NOTES
TRANSFER - OUT REPORT:    Verbal report given to [facility] RN(name) on Helio Gonzalez  being transferred to [facility] (unit) for routine progression of care       Report consisted of patients Situation, Background, Assessment and   Recommendations(SBAR). Information from the following report(s) SBAR, MAR, [facility]ion and Recent Results was reviewed with the receiving nurse. Opportunity for questions and clarification was provided.       Patient transported with:  BECKI

## 2022-06-27 NOTE — DISCHARGE INSTRUCTIONS
Discharge SNF/Rehab Instructions/LTAC       PATIENT ID: Shira Valentin  MRN: [id number]   YOB: 1935    DATE OF ADMISSION: 6/24/2022  2:22 PM    DATE OF DISCHARGE: 6/27/2022    PRIMARY CARE PROVIDER: None       ATTENDING PHYSICIAN: Jovany Dorsey MD  DISCHARGING PROVIDER: José Miguel Griffin MD     To contact this individual call [phone] and ask the  to page. If unavailable ask to be transferred the Adult Hospitalist Department. CONSULTATIONS: IP CONSULT TO NEUROLOGY    PROCEDURES/SURGERIES: * No surgery found *    [address] COURSE:   Dysarthria, dysphagia- CVA on MRI w/ small acute lacunar infact  Patient seen by neurologist ,recommendations  ASA, statin    Evaluated by speech with the following recommendations and Planned Interventions:  -- soft and bite sized diet/mildly thick (nectar) liquids  -- alternate bites and sips  -- upright for all PO   -- straws okay               DISCHARGE DIAGNOSES / PLAN:        PENDING TEST RESULTS:   At the time of discharge the following test results are still pending: none    FOLLOW UP APPOINTMENTS:    Follow-up Information    None          ADDITIONAL CARE RECOMMENDATIONS:     DIET:   Evaluated by speech with the following recommendations and Planned Interventions:  -- soft and bite sized diet/mildly thick (nectar) liquids  -- alternate bites and sips  -- upright for all PO   -- straws okay       OXYGEN / BiPAP SETTINGS:room air    ACTIVITY: Activity as tolerated and PT/OT per Home Health    WOUND CARE:   Scattered ruptured bullae to lower legs that are dry and left open to air     Left plantar foot ruptured bullae   Moist pink base with deflated dry burgundy on one side  1.5 x 3 cm  Petrolatum applied     Wound Recommendations:    Bacitracin daily to left plantar foot.   EQUIPMENT needed: own      DISCHARGE MEDICATIONS:   See Medication Reconciliation Form      NOTIFY YOUR PHYSICIAN FOR ANY OF THE FOLLOWING:   Fever over 101 degrees for 24 hours. Chest pain, shortness of breath, fever, chills, nausea, vomiting, diarrhea, change in mentation, falling, weakness, bleeding. Severe pain or pain not relieved by medications. Or, any other signs or symptoms that you may have questions about. DISPOSITION:    Home With:   OT  PT  HH  RN      x SNF/Inpatient Rehab/LTAC    Independent/assisted living    Hospice    Other:       PATIENT CONDITION AT DISCHARGE:     Functional status   x Poor     Deconditioned     Independent      Cognition    x Lucid     Forgetful     Dementia      Catheters/lines (plus indication)    Bowen     PICC     PEG    x None      Code status     Full code    x DNR      PHYSICAL EXAMINATION AT DISCHARGE:  General:          Alert, cooperative, no acute distress    EENT:              EOMI. Anicteric sclerae. dryMM  Resp:               CTA bilaterally, no wheezing or rales.  No accessory muscle use  CV:                  RRR, No audible murmur  GI:                   Soft, Non distended, Non tender  Neurologic:       Alert and oriented x2-3,mild dysarthria, moves UE normally, limited ROM b/l LE d/t chronic and  severe contractures      CHRONIC MEDICAL DIAGNOSES:  Problem List as of 6/27/2022 Date Reviewed: 4/20/2019          Codes Class Noted - Resolved    CVA (cerebral vascular accident) [facility]) ICD-10-CM: I63.9  ICD-9-CM: 434.91  6/24/2022 - Present        Other [facility] ICD-10-CM: K56.690  ICD-9-CM: 560.89  2/14/2022 - Present        Aspiration pneumonia ([facility] ICD-10-CM: J69.0  ICD-9-CM: 507.0  1/9/2019 - Present        Ankylosing spondylitis of multiple sites in spine [facility]) ICD-10-CM: M45.0  ICD-9-CM: 720.0  1/9/2019 - Present        Closed nondisplaced fracture of seventh cervical vertebra with routine healing ICD-10-CM: S12.601D  ICD-9-CM: V54.17  1/9/2019 - Present        Closed T7 fracture ([facility] ICD-10-CM: A25.322E  ICD-9-CM: 805.2  1/9/2019 - Present        Bed confinement status ICD-10-CM: Z74.01  ICD-9-CM: V49.84  1/9/2019 - Present        Drug-induced constipation ICD-10-CM: K59.03  ICD-9-CM: 564.09, E980.5  1/9/2019 - Present        Rheumatoid arthritis involving multiple sites with positive rheumatoid factor (HCC) ICD-10-CM: M05.79  ICD-9-CM: 714.0  12/3/2018 - Present        RESOLVED: Pneumonia ICD-10-CM: J18.9  ICD-9-CM: 486  2/12/2022 - 2/17/2022        RESOLVED: Acute hypoxemic respiratory failure ([facility] ICD-10-CM: J96.01  ICD-9-CM: 518.81  2/12/2022 - 2/17/2022        RESOLVED: Memory impairment ICD-10-CM: R41.3  ICD-9-CM: 780.93  1/9/2019 - 2/17/2022                    Signed:    Rubio Dick MD  6/27/2022  2:57 PM

## 2022-06-28 ENCOUNTER — PATIENT OUTREACH (OUTPATIENT)
Dept: CASE MANAGEMENT | Age: 87
End: 2022-06-28

## 2022-06-28 NOTE — PROGRESS NOTES
No transition of care outreach indicated due to patient discharge to a [address] [place] [address].

## 2024-06-28 ENCOUNTER — APPOINTMENT (RX ONLY)
Dept: URBAN - METROPOLITAN AREA CLINIC 140 | Facility: CLINIC | Age: 89
Setting detail: DERMATOLOGY
End: 2024-06-28

## 2024-06-28 DIAGNOSIS — L57.0 ACTINIC KERATOSIS: ICD-10-CM

## 2024-06-28 DIAGNOSIS — D485 NEOPLASM OF UNCERTAIN BEHAVIOR OF SKIN: ICD-10-CM

## 2024-06-28 DIAGNOSIS — L82.1 OTHER SEBORRHEIC KERATOSIS: ICD-10-CM

## 2024-06-28 DIAGNOSIS — D22 MELANOCYTIC NEVI: ICD-10-CM

## 2024-06-28 PROBLEM — D22.5 MELANOCYTIC NEVI OF TRUNK: Status: ACTIVE | Noted: 2024-06-28

## 2024-06-28 PROBLEM — D48.5 NEOPLASM OF UNCERTAIN BEHAVIOR OF SKIN: Status: ACTIVE | Noted: 2024-06-28

## 2024-06-28 PROCEDURE — 11102 TANGNTL BX SKIN SINGLE LES: CPT

## 2024-06-28 PROCEDURE — 11103 TANGNTL BX SKIN EA SEP/ADDL: CPT

## 2024-06-28 PROCEDURE — 17000 DESTRUCT PREMALG LESION: CPT | Mod: 59

## 2024-06-28 PROCEDURE — ? BIOPSY BY SHAVE METHOD

## 2024-06-28 PROCEDURE — ? LIQUID NITROGEN

## 2024-06-28 PROCEDURE — 17003 DESTRUCT PREMALG LES 2-14: CPT

## 2024-06-28 PROCEDURE — 99203 OFFICE O/P NEW LOW 30 MIN: CPT | Mod: 25

## 2024-06-28 PROCEDURE — ? COUNSELING

## 2024-06-28 ASSESSMENT — LOCATION SIMPLE DESCRIPTION DERM
LOCATION SIMPLE: LEFT CHEEK
LOCATION SIMPLE: LEFT UPPER BACK
LOCATION SIMPLE: LEFT FOREHEAD
LOCATION SIMPLE: LEFT EAR
LOCATION SIMPLE: LEFT FOREARM
LOCATION SIMPLE: RIGHT SHOULDER
LOCATION SIMPLE: RIGHT FOREARM

## 2024-06-28 ASSESSMENT — LOCATION ZONE DERM
LOCATION ZONE: FACE
LOCATION ZONE: EAR
LOCATION ZONE: TRUNK
LOCATION ZONE: ARM

## 2024-06-28 ASSESSMENT — LOCATION DETAILED DESCRIPTION DERM
LOCATION DETAILED: LEFT SUPERIOR CRUS OF ANTIHELIX
LOCATION DETAILED: RIGHT DISTAL RADIAL DORSAL FOREARM
LOCATION DETAILED: LEFT LATERAL FOREHEAD
LOCATION DETAILED: LEFT PROXIMAL DORSAL FOREARM
LOCATION DETAILED: RIGHT POSTERIOR SHOULDER
LOCATION DETAILED: LEFT INFERIOR UPPER BACK
LOCATION DETAILED: LEFT SUPERIOR PREAURICULAR CHEEK
LOCATION DETAILED: LEFT DISTAL DORSAL FOREARM
LOCATION DETAILED: LEFT MID-UPPER BACK
LOCATION DETAILED: RIGHT DISTAL DORSAL FOREARM

## 2024-06-28 NOTE — PROCEDURE: LIQUID NITROGEN
Detail Level: Detailed
Show Aperture Variable?: Yes
Render Post-Care Instructions In Note?: no
Number Of Freeze-Thaw Cycles: 3 freeze-thaw cycles
Post-Care Instructions: I reviewed with the patient in detail post-care instructions. Patient is to wear sunprotection, and avoid picking at any of the treated lesions. Pt may apply Vaseline to crusted or scabbing areas.
Duration Of Freeze Thaw-Cycle (Seconds): 5
Consent: The patient's consent was obtained including but not limited to risks of crusting, scabbing, blistering, scarring, darker or lighter pigmentary change, recurrence, incomplete removal and infection.

## 2024-07-22 ENCOUNTER — APPOINTMENT (RX ONLY)
Dept: URBAN - METROPOLITAN AREA CLINIC 141 | Facility: CLINIC | Age: 89
Setting detail: DERMATOLOGY
End: 2024-07-22

## 2024-07-22 PROBLEM — C44.319 BASAL CELL CARCINOMA OF SKIN OF OTHER PARTS OF FACE: Status: ACTIVE | Noted: 2024-07-22

## 2024-07-22 PROCEDURE — 13132 CMPLX RPR F/C/C/M/N/AX/G/H/F: CPT

## 2024-07-22 PROCEDURE — ? MOHS SURGERY

## 2024-07-22 PROCEDURE — 17311 MOHS 1 STAGE H/N/HF/G: CPT

## 2024-07-22 NOTE — PROCEDURE: MOHS SURGERY
Body Location Override (Optional - Billing Will Still Be Based On Selected Body Map Location If Applicable): left lateral forehead
Quality 130: Documentation Of Current Medications In The Medical Record: Current Medications Documented
Detail Level: Detailed
Mohs Case Number: [id number]
Date Of Previous Biopsy (Optional): 6/28/24
Previous Accession (Optional): [id number]
Biopsy Photograph Reviewed: Yes
Referring Physician (Optional): Lily
Consent Type: Consent 1 (Standard)
Eye Shield Used: No
Surgeon Performing Repair (Optional): 
Initial Size Of Lesion: 1.3
X Size Of Lesion In Cm (Optional): 1.1
Number Of Stages: 1
Primary Defect Length In Cm (Final Defect Size - Required For Flaps/Grafts): 1.4
Primary Defect Width In Cm (Final Defect Size - Required For Flaps/Grafts): 1.2
Primary Defect Depth In Cm (Optional But Required For Some Insurers): 0
Repair Type: Complex Repair
Which Instrument Did You Use For Dermabrasion?: Wire Brush
Which Eyelid Repair Cpt Are You Using?: 36877
Oculoplastic Surgeon Procedure Text (A): After obtaining clear surgical margins the patient was sent to oculoplastics for surgical repair.  The patient understands they will receive post-surgical care and follow-up from the referring physician's office.
Otolaryngologist Procedure Text (A): After obtaining clear surgical margins the patient was sent to otolaryngology for surgical repair.  The patient understands they will receive post-surgical care and follow-up from the referring physician's office.
Plastic Surgeon Procedure Text (A): After obtaining clear surgical margins the patient was sent to plastics for surgical repair.  The patient understands they will receive post-surgical care and follow-up from the referring physician's office.
Mid-Level Procedure Text (A): After obtaining clear surgical margins the patient was sent to a mid-level provider for surgical repair.  The patient understands they will receive post-surgical care and follow-up from the mid-level provider.
Provider Procedure Text (A): After obtaining clear surgical margins the defect was repaired by another provider.
Asc Procedure Text (A): After obtaining clear surgical margins the patient was sent to an ASC for surgical repair.  The patient understands they will receive post-surgical care and follow-up from the ASC physician.
Simple / Intermediate / Complex Repair - Final Wound Length In Cm: 3.5
Suturegard Retention Suture: 2-0 Nylon
Retention Suture Bite Size: 3 mm
Length To Time In Minutes Device Was In Place: 10
Distance Of Undermining In Cm (Required): 1.5
Undermining Type: Entire Wound
Debridement Text: The wound edges were debrided prior to proceeding with the closure to facilitate wound healing.
Helical Rim Text: The closure involved the helical rim.
Vermilion Border Text: The closure involved the vermilion border.
Nostril Rim Text: The closure involved the nostril rim.
Retention Suture Text: Retention sutures were placed to support the closure and prevent dehiscence.
Area H Indication Text: Tumors in this location are included in Area H (eyelids, eyebrows, nose, lips, chin, ear, pre-auricular, post-auricular, temple, genitalia, hands, feet, ankles and areola).  Tissue conservation is critical in these anatomic locations.
Area M Indication Text: Tumors in this location are included in Area M (cheek, forehead, scalp, neck, jawline and pretibial skin).  Mohs surgery is indicated for tumors in these anatomic locations.
Area L Indication Text: Tumors in this location are included in Area L (trunk and extremities).  Mohs surgery is indicated for larger tumors, or tumors with aggressive histologic features, in these anatomic locations.
Tumor Debulked?: curette
Depth Of Tumor Invasion (For Histology): tumor not visualized
Perineural Invasion (For Histology - Be Specific If Possible): absent
Special Stains Stage 1 - Results: Base On Clearance Noted Above
Stage 2: Additional Anesthesia Type: 1% lidocaine with epinephrine
Staging Info: By selecting yes to the question above you will include information on AJCC 8 tumor staging in your Mohs note. Information on tumor staging will be automatically added for SCCs on the head and neck. AJCC 8 includes tumor size, tumor depth, perineural involvement and bone invasion.
Tumor Depth: Less than 6mm from granular layer and no invasion beyond the subcutaneous fat
Was The Patient On Physician Recommended Anticoagulation Therapy?: Please Select the Appropriate Response
Medical Necessity Statement: Based on my medical judgement, Mohs surgery is the most appropriate treatment for this cancer compared to other treatments.
Alternatives Discussed Intro (Do Not Add Period): I discussed alternative treatments to Mohs surgery and specifically discussed the risks and benefits of
Consent 1/Introductory Paragraph: The rationale for Mohs was explained to the patient and consent was obtained. The risks, benefits and alternatives to therapy were discussed in detail. Specifically, the risks of infection, scarring, bleeding, prolonged wound healing, incomplete removal, allergy to anesthesia, nerve injury and recurrence were addressed. Prior to the procedure, the treatment site was clearly identified and confirmed by the patient. All components of Universal Protocol/PAUSE Rule completed.
Consent 2/Introductory Paragraph: Mohs surgery was explained to the patient and consent was obtained. The risks, benefits and alternatives to therapy were discussed in detail. Specifically, the risks of infection, scarring, bleeding, prolonged wound healing, incomplete removal, allergy to anesthesia, nerve injury and recurrence were addressed. Prior to the procedure, the treatment site was clearly identified and confirmed by the patient. All components of Universal Protocol/PAUSE Rule completed.
Consent 3/Introductory Paragraph: I gave the patient a chance to ask questions they had about the procedure.  Following this I explained the Mohs procedure and consent was obtained. The risks, benefits and alternatives to therapy were discussed in detail. Specifically, the risks of infection, scarring, bleeding, prolonged wound healing, incomplete removal, allergy to anesthesia, nerve injury and recurrence were addressed. Prior to the procedure, the treatment site was clearly identified and confirmed by the patient. All components of Universal Protocol/PAUSE Rule completed.
Consent (Temporal Branch)/Introductory Paragraph: The rationale for Mohs was explained to the patient and consent was obtained. The risks, benefits and alternatives to therapy were discussed in detail. Specifically, the risks of damage to the temporal branch of the facial nerve, infection, scarring, bleeding, prolonged wound healing, incomplete removal, allergy to anesthesia, and recurrence were addressed. Prior to the procedure, the treatment site was clearly identified and confirmed by the patient. All components of Universal Protocol/PAUSE Rule completed.
Consent (Marginal Mandibular)/Introductory Paragraph: The rationale for Mohs was explained to the patient and consent was obtained. The risks, benefits and alternatives to therapy were discussed in detail. Specifically, the risks of damage to the marginal mandibular branch of the facial nerve, infection, scarring, bleeding, prolonged wound healing, incomplete removal, allergy to anesthesia, and recurrence were addressed. Prior to the procedure, the treatment site was clearly identified and confirmed by the patient. All components of Universal Protocol/PAUSE Rule completed.
Consent (Spinal Accessory)/Introductory Paragraph: The rationale for Mohs was explained to the patient and consent was obtained. The risks, benefits and alternatives to therapy were discussed in detail. Specifically, the risks of damage to the spinal accessory nerve, infection, scarring, bleeding, prolonged wound healing, incomplete removal, allergy to anesthesia, and recurrence were addressed. Prior to the procedure, the treatment site was clearly identified and confirmed by the patient. All components of Universal Protocol/PAUSE Rule completed.
Consent (Near Eyelid Margin)/Introductory Paragraph: The rationale for Mohs was explained to the patient and consent was obtained. The risks, benefits and alternatives to therapy were discussed in detail. Specifically, the risks of ectropion or eyelid deformity, infection, scarring, bleeding, prolonged wound healing, incomplete removal, allergy to anesthesia, nerve injury and recurrence were addressed. Prior to the procedure, the treatment site was clearly identified and confirmed by the patient. All components of Universal Protocol/PAUSE Rule completed.
Consent (Ear)/Introductory Paragraph: The rationale for Mohs was explained to the patient and consent was obtained. The risks, benefits and alternatives to therapy were discussed in detail. Specifically, the risks of ear deformity, infection, scarring, bleeding, prolonged wound healing, incomplete removal, allergy to anesthesia, nerve injury and recurrence were addressed. Prior to the procedure, the treatment site was clearly identified and confirmed by the patient. All components of Universal Protocol/PAUSE Rule completed.
Consent (Nose)/Introductory Paragraph: The rationale for Mohs was explained to the patient and consent was obtained. The risks, benefits and alternatives to therapy were discussed in detail. Specifically, the risks of nasal deformity, changes in the flow of air through the nose, infection, scarring, bleeding, prolonged wound healing, incomplete removal, allergy to anesthesia, nerve injury and recurrence were addressed. Prior to the procedure, the treatment site was clearly identified and confirmed by the patient. All components of Universal Protocol/PAUSE Rule completed.
Consent (Lip)/Introductory Paragraph: The rationale for Mohs was explained to the patient and consent was obtained. The risks, benefits and alternatives to therapy were discussed in detail. Specifically, the risks of lip deformity, changes in the oral aperture, infection, scarring, bleeding, prolonged wound healing, incomplete removal, allergy to anesthesia, nerve injury and recurrence were addressed. Prior to the procedure, the treatment site was clearly identified and confirmed by the patient. All components of Universal Protocol/PAUSE Rule completed.
Consent (Scalp)/Introductory Paragraph: The rationale for Mohs was explained to the patient and consent was obtained. The risks, benefits and alternatives to therapy were discussed in detail. Specifically, the risks of changes in hair growth pattern secondary to repair, infection, scarring, bleeding, prolonged wound healing, incomplete removal, allergy to anesthesia, nerve injury and recurrence were addressed. Prior to the procedure, the treatment site was clearly identified and confirmed by the patient. All components of Universal Protocol/PAUSE Rule completed.
Detail Level: Detailed
Postop Diagnosis: same
Anesthesia Type: 0.5% lidocaine with 1:200,000 epinephrine
Hemostasis: Electrocautery
Estimated Blood Loss (Cc): minimal
Anesthesia Volume In Cc: 1.7
Brow Lift Text: A midfrontal incision was made medially to the defect to allow access to the tissues just superior to the left eyebrow. Following careful dissection inferiorly in a supraperiosteal plane to the level of the left eyebrow, several 3-0 monocryl sutures were used to resuspend the eyebrow orbicularis oculi muscular unit to the superior frontal bone periosteum. This resulted in an appropriate reapproximation of static eyebrow symmetry and correction of the left brow ptosis.
Deep Sutures: 4-0 Monocryl
Epidermal Sutures: 5-0 Fast Absorbing Gut
Epidermal Closure: running
Suturegard Intro: Intraoperative tissue expansion was performed, utilizing the SUTUREGARD device, in order to reduce wound tension.
Suturegard Body: The suture ends were repeatedly re-tightened and re-clamped to achieve the desired tissue expansion.
Hemigard Intro: Due to skin fragility and wound tension, it was decided to use HEMIGARD adhesive retention suture devices to permit a linear closure. The skin was cleaned and dried for a 6cm distance away from the wound. Excessive hair, if present, was removed to allow for adhesion.
Hemigard Postcare Instructions: The HEMIGARD strips are to remain completely dry for at least 5-7 days.
Donor Site Anesthesia Type: same as repair anesthesia
Epidermal Closure Graft Donor Site (Optional): simple interrupted
Graft Donor Site Bandage (Optional-Leave Blank If You Don't Want In Note): Steri-strips and a pressure bandage were applied to the donor site.
Closure 2 Information: This tab is for additional flaps and grafts, including complex repair and grafts and complex repair and flaps. You can also specify a different location for the additional defect, if the location is the same you do not need to select a new one. We will insert the automated text for the repair you select below just as we do for solitary flaps and grafts. Please note that at this time if you select a location with a different insurance zone you will need to override the ICD10 and CPT if appropriate.
Closure 3 Information: This tab is for additional flaps and grafts above and beyond our usual structured repairs.  Please note if you enter information here it will not currently bill and you will need to add the billing information manually.
Wound Care: Petrolatum
Dressing: dry sterile dressing
Unna Boot Text: An Unna boot was placed to help immobilize the limb and facilitate more rapid healing.
Home Suture Removal Text: Patient was provided instructions on removing sutures and will remove their sutures at home.  If they have any questions or difficulties they will call the office.
Post-Care Instructions: I reviewed with the patient in detail post-care instructions. Patient is not to engage in any heavy lifting, exercise, or swimming for the next 14 days. Should the patient develop any fevers, chills, bleeding, severe pain patient will contact the office immediately.
Pain Refusal Text: I offered to prescribe pain medication but the patient refused to take this medication.
Mauc Instructions: By selecting yes to the question below the MAUC number will be added into the note.  This will be calculated automatically based on the diagnosis chosen, the size entered, the body zone selected (H,M,L) and the specific indications you chose. You will also have the option to override the Mohs AUC if you disagree with the automatically calculated number and this option is found in the Case Summary tab.
Where Do You Want The Question To Include Opioid Counseling Located?: Case Summary Tab
Eye Protection Verbiage: Before proceeding with the stage, a plastic scleral shield was inserted. The globe was anesthetized with a few drops of 1% lidocaine with 1:100,000 epinephrine. Then, an appropriate sized scleral shield was chosen and coated with lacrilube ointment. The shield was gently inserted and left in place for the duration of each stage. After the stage was completed, the shield was gently removed.
Mohs Method Verbiage: An incision at a 45 degree angle following the standard Mohs approach was done and the specimen was harvested as a microscopic controlled layer.
Surgeon/Pathologist Verbiage (Will Incorporate Name Of Surgeon From Intro If Not Blank): operated in two distinct and integrated capacities as the surgeon and pathologist.
Mohs Histo Method Verbiage: Each section was then chromacoded and processed in the Mohs lab using the Mohs protocol and submitted for frozen section.
Subsequent Stages Histo Method Verbiage: Using a similar technique to that described above, a thin layer of tissue was removed from all areas where tumor was visible on the previous stage.  The tissue was again oriented, mapped, dyed, and processed as above.
Mohs Rapid Report Verbiage: The area of clinically evident tumor was marked with skin marking ink and appropriately hatched.  The initial incision was made following the Mohs approach through the skin.  The specimen was taken to the lab, divided into the necessary number of pieces, chromacoded and processed according to the Mohs protocol.  This was repeated in successive stages until a tumor free defect was achieved.
Complex Repair Preamble Text (Leave Blank If You Do Not Want): Extensive wide undermining was performed.
Intermediate Repair Preamble Text (Leave Blank If You Do Not Want): Undermining was performed with blunt dissection.
Graft Cartilage Fenestration Text: The cartilage was fenestrated with a 2mm punch biopsy to help facilitate graft survival and healing.
Non-Graft Cartilage Fenestration Text: The cartilage was fenestrated with a 2mm punch biopsy to help facilitate healing.
Secondary Intention Text (Leave Blank If You Do Not Want): The defect will heal with secondary intention.
No Repair - Repaired With Adjacent Surgical Defect Text (Leave Blank If You Do Not Want): After obtaining clear surgical margins the defect was repaired concurrently with another surgical defect which was in close approximation.
Adjacent Tissue Transfer Text: The defect edges were debeveled with a #15 scalpel blade.  Given the location of the defect and the proximity to free margins an adjacent tissue transfer was deemed most appropriate.  Using a sterile surgical marker, an appropriate flap was drawn incorporating the defect and placing the expected incisions within the relaxed skin tension lines where possible.    The area thus outlined was incised deep to adipose tissue with a #15 scalpel blade.  The skin margins were undermined to an appropriate distance in all directions utilizing iris scissors.
Advancement Flap (Single) Text: The defect edges were debeveled with a #15 scalpel blade.  Given the location of the defect and the proximity to free margins a single advancement flap was deemed most appropriate.  Using a sterile surgical marker, an appropriate advancement flap was drawn incorporating the defect and placing the expected incisions within the relaxed skin tension lines where possible.    The area thus outlined was incised deep to adipose tissue with a #15 scalpel blade.  The skin margins were undermined to an appropriate distance in all directions utilizing iris scissors.
Advancement Flap (Double) Text: The defect edges were debeveled with a #15 scalpel blade.  Given the location of the defect and the proximity to free margins a double advancement flap was deemed most appropriate.  Using a sterile surgical marker, the appropriate advancement flaps were drawn incorporating the defect and placing the expected incisions within the relaxed skin tension lines where possible.    The area thus outlined was incised deep to adipose tissue with a #15 scalpel blade.  The skin margins were undermined to an appropriate distance in all directions utilizing iris scissors.
Advancement-Rotation Flap Text: The defect edges were debeveled with a #15 scalpel blade.  Given the location of the defect, shape of the defect and the proximity to free margins an advancement-rotation flap was deemed most appropriate.  Using a sterile surgical marker, an appropriate flap was drawn incorporating the defect and placing the expected incisions within the relaxed skin tension lines where possible. The area thus outlined was incised deep to adipose tissue with a #15 scalpel blade.  The skin margins were undermined to an appropriate distance in all directions utilizing iris scissors.
Alar Island Pedicle Flap Text: The defect edges were debeveled with a #15 scalpel blade.  Given the location of the defect, shape of the defect and the proximity to the alar rim an island pedicle advancement flap was deemed most appropriate.  Using a sterile surgical marker, an appropriate advancement flap was drawn incorporating the defect, outlining the appropriate donor tissue and placing the expected incisions within the nasal ala running parallel to the alar rim. The area thus outlined was incised with a #15 scalpel blade.  The skin margins were undermined minimally to an appropriate distance in all directions around the primary defect and laterally outward around the island pedicle utilizing iris scissors.  There was minimal undermining beneath the pedicle flap.
A-T Advancement Flap Text: The defect edges were debeveled with a #15 scalpel blade.  Given the location of the defect, shape of the defect and the proximity to free margins an A-T advancement flap was deemed most appropriate.  Using a sterile surgical marker, an appropriate advancement flap was drawn incorporating the defect and placing the expected incisions within the relaxed skin tension lines where possible.    The area thus outlined was incised deep to adipose tissue with a #15 scalpel blade.  The skin margins were undermined to an appropriate distance in all directions utilizing iris scissors.
Banner Transposition Flap Text: The defect edges were debeveled with a #15 scalpel blade.  Given the location of the defect and the proximity to free margins a Banner transposition flap was deemed most appropriate.  Using a sterile surgical marker, an appropriate flap drawn around the defect. The area thus outlined was incised deep to adipose tissue with a #15 scalpel blade.  The skin margins were undermined to an appropriate distance in all directions utilizing iris scissors.
Bilateral Helical Rim Advancement Flap Text: The defect edges were debeveled with a #15 blade scalpel.  Given the location of the defect and the proximity to free margins (helical rim) a bilateral helical rim advancement flap was deemed most appropriate.  Using a sterile surgical marker, the appropriate advancement flaps were drawn incorporating the defect and placing the expected incisions between the helical rim and antihelix where possible.  The area thus outlined was incised through and through with a #15 scalpel blade.  With a skin hook and iris scissors, the flaps were gently and sharply undermined and freed up.
Bilateral Rotation Flap Text: The defect edges were debeveled with a #15 scalpel blade. Given the location of the defect, shape of the defect and the proximity to free margins a bilateral rotation flap was deemed most appropriate. Using a sterile surgical marker, an appropriate rotation flap was drawn incorporating the defect and placing the expected incisions within the relaxed skin tension lines where possible. The area thus outlined was incised deep to adipose tissue with a #15 scalpel blade. The skin margins were undermined to an appropriate distance in all directions utilizing iris scissors. Following this, the designed flap was carried over into the primary defect and sutured into place.
Bilobed Flap Text: The defect edges were debeveled with a #15 scalpel blade.  Given the location of the defect and the proximity to free margins a bilobe flap was deemed most appropriate.  Using a sterile surgical marker, an appropriate bilobe flap drawn around the defect.    The area thus outlined was incised deep to adipose tissue with a #15 scalpel blade.  The skin margins were undermined to an appropriate distance in all directions utilizing iris scissors.
Bilobed Transposition Flap Text: The defect edges were debeveled with a #15 scalpel blade.  Given the location of the defect and the proximity to free margins a bilobed transposition flap was deemed most appropriate.  Using a sterile surgical marker, an appropriate bilobe flap drawn around the defect.    The area thus outlined was incised deep to adipose tissue with a #15 scalpel blade.  The skin margins were undermined to an appropriate distance in all directions utilizing iris scissors.
Bi-Rhombic Flap Text: The defect edges were debeveled with a #15 scalpel blade.  Given the location of the defect and the proximity to free margins a bi-rhombic flap was deemed most appropriate.  Using a sterile surgical marker, an appropriate rhombic flap was drawn incorporating the defect. The area thus outlined was incised deep to adipose tissue with a #15 scalpel blade.  The skin margins were undermined to an appropriate distance in all directions utilizing iris scissors.
Burow's Advancement Flap Text: The defect edges were debeveled with a #15 scalpel blade.  Given the location of the defect and the proximity to free margins a Burow's advancement flap was deemed most appropriate.  Using a sterile surgical marker, the appropriate advancement flap was drawn incorporating the defect and placing the expected incisions within the relaxed skin tension lines where possible.    The area thus outlined was incised deep to adipose tissue with a #15 scalpel blade.  The skin margins were undermined to an appropriate distance in all directions utilizing iris scissors.
Chonodrocutaneous Helical Advancement Flap Text: The defect edges were debeveled with a #15 scalpel blade.  Given the location of the defect and the proximity to free margins a chondrocutaneous helical advancement flap was deemed most appropriate.  Using a sterile surgical marker, the appropriate advancement flap was drawn incorporating the defect and placing the expected incisions within the relaxed skin tension lines where possible.    The area thus outlined was incised deep to adipose tissue with a #15 scalpel blade.  The skin margins were undermined to an appropriate distance in all directions utilizing iris scissors.
Crescentic Advancement Flap Text: The defect edges were debeveled with a #15 scalpel blade.  Given the location of the defect and the proximity to free margins a crescentic advancement flap was deemed most appropriate.  Using a sterile surgical marker, the appropriate advancement flap was drawn incorporating the defect and placing the expected incisions within the relaxed skin tension lines where possible.    The area thus outlined was incised deep to adipose tissue with a #15 scalpel blade.  The skin margins were undermined to an appropriate distance in all directions utilizing iris scissors.
Dorsal Nasal Flap Text: The defect edges were debeveled with a #15 scalpel blade.  Given the location of the defect and the proximity to free margins a dorsal nasal flap was deemed most appropriate.  Using a sterile surgical marker, an appropriate dorsal nasal flap was drawn around the defect.    The area thus outlined was incised deep to adipose tissue with a #15 scalpel blade.  The skin margins were undermined to an appropriate distance in all directions utilizing iris scissors.
Double Island Pedicle Flap Text: The defect edges were debeveled with a #15 scalpel blade.  Given the location of the defect, shape of the defect and the proximity to free margins a double island pedicle advancement flap was deemed most appropriate.  Using a sterile surgical marker, an appropriate advancement flap was drawn incorporating the defect, outlining the appropriate donor tissue and placing the expected incisions within the relaxed skin tension lines where possible.    The area thus outlined was incised deep to adipose tissue with a #15 scalpel blade.  The skin margins were undermined to an appropriate distance in all directions around the primary defect and laterally outward around the island pedicle utilizing iris scissors.  There was minimal undermining beneath the pedicle flap.
Double O-Z Flap Text: The defect edges were debeveled with a #15 scalpel blade.  Given the location of the defect, shape of the defect and the proximity to free margins a Double O-Z flap was deemed most appropriate.  Using a sterile surgical marker, an appropriate transposition flap was drawn incorporating the defect and placing the expected incisions within the relaxed skin tension lines where possible. The area thus outlined was incised deep to adipose tissue with a #15 scalpel blade.  The skin margins were undermined to an appropriate distance in all directions utilizing iris scissors.
Double O-Z Plasty Text: The defect edges were debeveled with a #15 scalpel blade.  Given the location of the defect, shape of the defect and the proximity to free margins a Double O-Z plasty (double transposition flap) was deemed most appropriate.  Using a sterile surgical marker, the appropriate transposition flaps were drawn incorporating the defect and placing the expected incisions within the relaxed skin tension lines where possible. The area thus outlined was incised deep to adipose tissue with a #15 scalpel blade.  The skin margins were undermined to an appropriate distance in all directions utilizing iris scissors.  Hemostasis was achieved with electrocautery.  The flaps were then transposed into place, one clockwise and the other counterclockwise, and anchored with interrupted buried subcutaneous sutures.
Double Z Plasty Text: The lesion was extirpated to the level of the fat with a #15 scalpel blade. Given the location of the defect, shape of the defect and the proximity to free margins a double Z-plasty was deemed most appropriate for repair. Using a sterile surgical marker, the appropriate transposition arms of the double Z-plasty were drawn incorporating the defect and placing the expected incisions within the relaxed skin tension lines where possible. The area thus outlined was incised deep to adipose tissue with a #15 scalpel blade. The skin margins were undermined to an appropriate distance in all directions utilizing iris scissors. The opposing transposition arms were then transposed and carried over into place in opposite direction and anchored with interrupted buried subcutaneous sutures.
Ear Star Wedge Flap Text: The defect edges were debeveled with a #15 blade scalpel.  Given the location of the defect and the proximity to free margins (helical rim) an ear star wedge flap was deemed most appropriate.  Using a sterile surgical marker, the appropriate flap was drawn incorporating the defect and placing the expected incisions between the helical rim and antihelix where possible.  The area thus outlined was incised through and through with a #15 scalpel blade.
Flip-Flop Flap Text: The defect edges were debeveled with a #15 blade scalpel.  Given the location of the defect and the proximity to free margins a flip-flop flap was deemed most appropriate. Using a sterile surgical marker, the appropriate flap was drawn incorporating the defect and placing the expected incisions between the helical rim and antihelix where possible.  The area thus outlined was incised through and through with a #15 scalpel blade. Following this, the designed flap was carried over into the primary defect and sutured into place.
Hatchet Flap Text: The defect edges were debeveled with a #15 scalpel blade.  Given the location of the defect, shape of the defect and the proximity to free margins a hatchet flap was deemed most appropriate.  Using a sterile surgical marker, an appropriate hatchet flap was drawn incorporating the defect and placing the expected incisions within the relaxed skin tension lines where possible.    The area thus outlined was incised deep to adipose tissue with a #15 scalpel blade.  The skin margins were undermined to an appropriate distance in all directions utilizing iris scissors.
Helical Rim Advancement Flap Text: The defect edges were debeveled with a #15 blade scalpel.  Given the location of the defect and the proximity to free margins (helical rim) a double helical rim advancement flap was deemed most appropriate.  Using a sterile surgical marker, the appropriate advancement flaps were drawn incorporating the defect and placing the expected incisions between the helical rim and antihelix where possible.  The area thus outlined was incised through and through with a #15 scalpel blade.  With a skin hook and iris scissors, the flaps were gently and sharply undermined and freed up.
H Plasty Text: Given the location of the defect, shape of the defect and the proximity to free margins a H-plasty was deemed most appropriate for repair.  Using a sterile surgical marker, the appropriate advancement arms of the H-plasty were drawn incorporating the defect and placing the expected incisions within the relaxed skin tension lines where possible. The area thus outlined was incised deep to adipose tissue with a #15 scalpel blade. The skin margins were undermined to an appropriate distance in all directions utilizing iris scissors.  The opposing advancement arms were then advanced into place in opposite direction and anchored with interrupted buried subcutaneous sutures.
Island Pedicle Flap Text: The defect edges were debeveled with a #15 scalpel blade.  Given the location of the defect, shape of the defect and the proximity to free margins an island pedicle advancement flap was deemed most appropriate.  Using a sterile surgical marker, an appropriate advancement flap was drawn incorporating the defect, outlining the appropriate donor tissue and placing the expected incisions within the relaxed skin tension lines where possible.    The area thus outlined was incised deep to adipose tissue with a #15 scalpel blade.  The skin margins were undermined to an appropriate distance in all directions around the primary defect and laterally outward around the island pedicle utilizing iris scissors.  There was minimal undermining beneath the pedicle flap.
Island Pedicle Flap With Canthal Suspension Text: The defect edges were debeveled with a #15 scalpel blade.  Given the location of the defect, shape of the defect and the proximity to free margins an island pedicle advancement flap was deemed most appropriate.  Using a sterile surgical marker, an appropriate advancement flap was drawn incorporating the defect, outlining the appropriate donor tissue and placing the expected incisions within the relaxed skin tension lines where possible. The area thus outlined was incised deep to adipose tissue with a #15 scalpel blade.  The skin margins were undermined to an appropriate distance in all directions around the primary defect and laterally outward around the island pedicle utilizing iris scissors.  There was minimal undermining beneath the pedicle flap. A suspension suture was placed in the canthal tendon to prevent tension and prevent ectropion.
Island Pedicle Flap-Requiring Vessel Identification Text: The defect edges were debeveled with a #15 scalpel blade.  Given the location of the defect, shape of the defect and the proximity to free margins an island pedicle advancement flap was deemed most appropriate.  Using a sterile surgical marker, an appropriate advancement flap was drawn, based on the axial vessel mentioned above, incorporating the defect, outlining the appropriate donor tissue and placing the expected incisions within the relaxed skin tension lines where possible.    The area thus outlined was incised deep to adipose tissue with a #15 scalpel blade.  The skin margins were undermined to an appropriate distance in all directions around the primary defect and laterally outward around the island pedicle utilizing iris scissors.  There was minimal undermining beneath the pedicle flap.
Keystone Flap Text: The defect edges were debeveled with a #15 scalpel blade.  Given the location of the defect, shape of the defect a keystone flap was deemed most appropriate.  Using a sterile surgical marker, an appropriate keystone flap was drawn incorporating the defect, outlining the appropriate donor tissue and placing the expected incisions within the relaxed skin tension lines where possible. The area thus outlined was incised deep to adipose tissue with a #15 scalpel blade.  The skin margins were undermined to an appropriate distance in all directions around the primary defect and laterally outward around the flap utilizing iris scissors.
Melolabial Transposition Flap Text: The defect edges were debeveled with a #15 scalpel blade.  Given the location of the defect and the proximity to free margins a melolabial flap was deemed most appropriate.  Using a sterile surgical marker, an appropriate melolabial transposition flap was drawn incorporating the defect.    The area thus outlined was incised deep to adipose tissue with a #15 scalpel blade.  The skin margins were undermined to an appropriate distance in all directions utilizing iris scissors.
Mercedes Flap Text: The defect edges were debeveled with a #15 scalpel blade.  Given the location of the defect, shape of the defect and the proximity to free margins a Mercedes flap was deemed most appropriate.  Using a sterile surgical marker, an appropriate advancement flap was drawn incorporating the defect and placing the expected incisions within the relaxed skin tension lines where possible. The area thus outlined was incised deep to adipose tissue with a #15 scalpel blade.  The skin margins were undermined to an appropriate distance in all directions utilizing iris scissors.
Modified Advancement Flap Text: The defect edges were debeveled with a #15 scalpel blade.  Given the location of the defect, shape of the defect and the proximity to free margins a modified advancement flap was deemed most appropriate.  Using a sterile surgical marker, an appropriate advancement flap was drawn incorporating the defect and placing the expected incisions within the relaxed skin tension lines where possible.    The area thus outlined was incised deep to adipose tissue with a #15 scalpel blade.  The skin margins were undermined to an appropriate distance in all directions utilizing iris scissors.
Mucosal Advancement Flap Text: Given the location of the defect, shape of the defect and the proximity to free margins a mucosal advancement flap was deemed most appropriate. Incisions were made with a 15 blade scalpel in the appropriate fashion along the cutaneous vermilion border and the mucosal lip. The remaining actinically damaged mucosal tissue was excised.  The mucosal advancement flap was then elevated to the gingival sulcus with care taken to preserve the neurovascular structures and advanced into the primary defect. Care was taken to ensure that precise realignment of the vermilion border was achieved.
Muscle Hinge Flap Text: The defect edges were debeveled with a #15 scalpel blade.  Given the size, depth and location of the defect and the proximity to free margins a muscle hinge flap was deemed most appropriate.  Using a sterile surgical marker, an appropriate hinge flap was drawn incorporating the defect. The area thus outlined was incised with a #15 scalpel blade.  The skin margins were undermined to an appropriate distance in all directions utilizing iris scissors.
Mustarde Flap Text: The defect edges were debeveled with a #15 scalpel blade.  Given the size, depth and location of the defect and the proximity to free margins a Mustarde flap was deemed most appropriate.  Using a sterile surgical marker, an appropriate flap was drawn incorporating the defect. The area thus outlined was incised with a #15 scalpel blade.  The skin margins were undermined to an appropriate distance in all directions utilizing iris scissors.
Nasal Turnover Hinge Flap Text: The defect edges were debeveled with a #15 scalpel blade.  Given the size, depth, location of the defect and the defect being full thickness a nasal turnover hinge flap was deemed most appropriate.  Using a sterile surgical marker, an appropriate hinge flap was drawn incorporating the defect. The area thus outlined was incised with a #15 scalpel blade. The flap was designed to recreate the nasal mucosal lining and the alar rim. The skin margins were undermined to an appropriate distance in all directions utilizing iris scissors.
Nasalis-Muscle-Based Myocutaneous Island Pedicle Flap Text: Using a #15 blade, an incision was made around the donor flap to the level of the nasalis muscle. Wide lateral undermining was then performed in both the subcutaneous plane above the nasalis muscle, and in a submuscular plane just above periosteum. This allowed the formation of a free nasalis muscle axial pedicle (based on the angular artery) which was still attached to the actual cutaneous flap, increasing its mobility and vascular viability. Hemostasis was obtained with pinpoint electrocoagulation. The flap was mobilized into position and the pivotal anchor points positioned and stabilized with buried interrupted sutures. Subcutaneous and dermal tissues were closed in a multilayered fashion with sutures. Tissue redundancies were excised, and the epidermal edges were apposed without significant tension and sutured with sutures.
Nasalis Myocutaneous Flap Text: Using a #15 blade, an incision was made around the donor flap to the level of the nasalis muscle. Wide lateral undermining was then performed in both the subcutaneous plane above the nasalis muscle, and in a submuscular plane just above periosteum. This allowed the formation of a free nasalis muscle axial pedicle which was still attached to the actual cutaneous flap, increasing its mobility and vascular viability. Hemostasis was obtained with pinpoint electrocoagulation. The flap was mobilized into position and the pivotal anchor points positioned and stabilized with buried interrupted sutures. Subcutaneous and dermal tissues were closed in a multilayered fashion with sutures. Tissue redundancies were excised, and the epidermal edges were apposed without significant tension and sutured with sutures.
Nasolabial Transposition Flap Text: The defect edges were debeveled with a #15 scalpel blade.  Given the size, depth and location of the defect and the proximity to free margins a nasolabial transposition flap was deemed most appropriate. Using a sterile surgical marker, an appropriate flap was drawn incorporating the defect. The area thus outlined was incised with a #15 scalpel blade. The skin margins were undermined to an appropriate distance in all directions utilizing iris scissors. Following this, the designed flap was carried into the primary defect and sutured into place.
Orbicularis Oris Muscle Flap Text: The defect edges were debeveled with a #15 scalpel blade.  Given that the defect affected the competency of the oral sphincter an orbicularis oris muscle flap was deemed most appropriate to restore this competency and normal muscle function.  Using a sterile surgical marker, an appropriate flap was drawn incorporating the defect. The area thus outlined was incised with a #15 scalpel blade.
O-T Advancement Flap Text: The defect edges were debeveled with a #15 scalpel blade.  Given the location of the defect, shape of the defect and the proximity to free margins an O-T advancement flap was deemed most appropriate.  Using a sterile surgical marker, an appropriate advancement flap was drawn incorporating the defect and placing the expected incisions within the relaxed skin tension lines where possible.    The area thus outlined was incised deep to adipose tissue with a #15 scalpel blade.  The skin margins were undermined to an appropriate distance in all directions utilizing iris scissors.
O-T Plasty Text: The defect edges were debeveled with a #15 scalpel blade.  Given the location of the defect, shape of the defect and the proximity to free margins an O-T plasty was deemed most appropriate.  Using a sterile surgical marker, an appropriate O-T plasty was drawn incorporating the defect and placing the expected incisions within the relaxed skin tension lines where possible.    The area thus outlined was incised deep to adipose tissue with a #15 scalpel blade.  The skin margins were undermined to an appropriate distance in all directions utilizing iris scissors.
O-L Flap Text: The defect edges were debeveled with a #15 scalpel blade.  Given the location of the defect, shape of the defect and the proximity to free margins an O-L flap was deemed most appropriate.  Using a sterile surgical marker, an appropriate advancement flap was drawn incorporating the defect and placing the expected incisions within the relaxed skin tension lines where possible.    The area thus outlined was incised deep to adipose tissue with a #15 scalpel blade.  The skin margins were undermined to an appropriate distance in all directions utilizing iris scissors.
O-Z Flap Text: The defect edges were debeveled with a #15 scalpel blade.  Given the location of the defect, shape of the defect and the proximity to free margins an O-Z flap was deemed most appropriate.  Using a sterile surgical marker, an appropriate transposition flap was drawn incorporating the defect and placing the expected incisions within the relaxed skin tension lines where possible. The area thus outlined was incised deep to adipose tissue with a #15 scalpel blade.  The skin margins were undermined to an appropriate distance in all directions utilizing iris scissors.
O-Z Plasty Text: The defect edges were debeveled with a #15 scalpel blade.  Given the location of the defect, shape of the defect and the proximity to free margins an O-Z plasty (double transposition flap) was deemed most appropriate.  Using a sterile surgical marker, the appropriate transposition flaps were drawn incorporating the defect and placing the expected incisions within the relaxed skin tension lines where possible.    The area thus outlined was incised deep to adipose tissue with a #15 scalpel blade.  The skin margins were undermined to an appropriate distance in all directions utilizing iris scissors.  Hemostasis was achieved with electrocautery.  The flaps were then transposed into place, one clockwise and the other counterclockwise, and anchored with interrupted buried subcutaneous sutures.
Peng Advancement Flap Text: The defect edges were debeveled with a #15 scalpel blade.  Given the location of the defect, shape of the defect and the proximity to free margins a Peng advancement flap was deemed most appropriate.  Using a sterile surgical marker, an appropriate advancement flap was drawn incorporating the defect and placing the expected incisions within the relaxed skin tension lines where possible. The area thus outlined was incised deep to adipose tissue with a #15 scalpel blade.  The skin margins were undermined to an appropriate distance in all directions utilizing iris scissors.
Rectangular Flap Text: The defect edges were debeveled with a #15 scalpel blade. Given the location of the defect and the proximity to free margins a rectangular flap was deemed most appropriate. Using a sterile surgical marker, an appropriate rectangular flap was drawn incorporating the defect. The area thus outlined was incised deep to adipose tissue with a #15 scalpel blade. The skin margins were undermined to an appropriate distance in all directions utilizing iris scissors. Following this, the designed flap was carried over into the primary defect and sutured into place.
Rhombic Flap Text: The defect edges were debeveled with a #15 scalpel blade.  Given the location of the defect and the proximity to free margins a rhombic flap was deemed most appropriate.  Using a sterile surgical marker, an appropriate rhombic flap was drawn incorporating the defect.    The area thus outlined was incised deep to adipose tissue with a #15 scalpel blade.  The skin margins were undermined to an appropriate distance in all directions utilizing iris scissors.
Rhomboid Transposition Flap Text: The defect edges were debeveled with a #15 scalpel blade.  Given the location of the defect and the proximity to free margins a rhomboid transposition flap was deemed most appropriate.  Using a sterile surgical marker, an appropriate rhomboid flap was drawn incorporating the defect.    The area thus outlined was incised deep to adipose tissue with a #15 scalpel blade.  The skin margins were undermined to an appropriate distance in all directions utilizing iris scissors.
Rotation Flap Text: The defect edges were debeveled with a #15 scalpel blade.  Given the location of the defect, shape of the defect and the proximity to free margins a rotation flap was deemed most appropriate.  Using a sterile surgical marker, an appropriate rotation flap was drawn incorporating the defect and placing the expected incisions within the relaxed skin tension lines where possible.    The area thus outlined was incised deep to adipose tissue with a #15 scalpel blade.  The skin margins were undermined to an appropriate distance in all directions utilizing iris scissors.
Spiral Flap Text: The defect edges were debeveled with a #15 scalpel blade.  Given the location of the defect, shape of the defect and the proximity to free margins a spiral flap was deemed most appropriate.  Using a sterile surgical marker, an appropriate rotation flap was drawn incorporating the defect and placing the expected incisions within the relaxed skin tension lines where possible. The area thus outlined was incised deep to adipose tissue with a #15 scalpel blade.  The skin margins were undermined to an appropriate distance in all directions utilizing iris scissors.
Staged Advancement Flap Text: The defect edges were debeveled with a #15 scalpel blade.  Given the location of the defect, shape of the defect and the proximity to free margins a staged advancement flap was deemed most appropriate.  Using a sterile surgical marker, an appropriate advancement flap was drawn incorporating the defect and placing the expected incisions within the relaxed skin tension lines where possible. The area thus outlined was incised deep to adipose tissue with a #15 scalpel blade.  The skin margins were undermined to an appropriate distance in all directions utilizing iris scissors.
Star Wedge Flap Text: The defect edges were debeveled with a #15 scalpel blade.  Given the location of the defect, shape of the defect and the proximity to free margins a star wedge flap was deemed most appropriate.  Using a sterile surgical marker, an appropriate rotation flap was drawn incorporating the defect and placing the expected incisions within the relaxed skin tension lines where possible. The area thus outlined was incised deep to adipose tissue with a #15 scalpel blade.  The skin margins were undermined to an appropriate distance in all directions utilizing iris scissors.
Transposition Flap Text: The defect edges were debeveled with a #15 scalpel blade.  Given the location of the defect and the proximity to free margins a transposition flap was deemed most appropriate.  Using a sterile surgical marker, an appropriate transposition flap was drawn incorporating the defect.    The area thus outlined was incised deep to adipose tissue with a #15 scalpel blade.  The skin margins were undermined to an appropriate distance in all directions utilizing iris scissors.
Trilobed Flap Text: The defect edges were debeveled with a #15 scalpel blade.  Given the location of the defect and the proximity to free margins a trilobed flap was deemed most appropriate.  Using a sterile surgical marker, an appropriate trilobed flap drawn around the defect.    The area thus outlined was incised deep to adipose tissue with a #15 scalpel blade.  The skin margins were undermined to an appropriate distance in all directions utilizing iris scissors.
V-Y Flap Text: The defect edges were debeveled with a #15 scalpel blade.  Given the location of the defect, shape of the defect and the proximity to free margins a V-Y flap was deemed most appropriate.  Using a sterile surgical marker, an appropriate advancement flap was drawn incorporating the defect and placing the expected incisions within the relaxed skin tension lines where possible.    The area thus outlined was incised deep to adipose tissue with a #15 scalpel blade.  The skin margins were undermined to an appropriate distance in all directions utilizing iris scissors.
V-Y Plasty Text: The defect edges were debeveled with a #15 scalpel blade.  Given the location of the defect, shape of the defect and the proximity to free margins an V-Y advancement flap was deemed most appropriate.  Using a sterile surgical marker, an appropriate advancement flap was drawn incorporating the defect and placing the expected incisions within the relaxed skin tension lines where possible.    The area thus outlined was incised deep to adipose tissue with a #15 scalpel blade.  The skin margins were undermined to an appropriate distance in all directions utilizing iris scissors.
W Plasty Text: The lesion was extirpated to the level of the fat with a #15 scalpel blade.  Given the location of the defect, shape of the defect and the proximity to free margins a W-plasty was deemed most appropriate for repair.  Using a sterile surgical marker, the appropriate transposition arms of the W-plasty were drawn incorporating the defect and placing the expected incisions within the relaxed skin tension lines where possible.    The area thus outlined was incised deep to adipose tissue with a #15 scalpel blade.  The skin margins were undermined to an appropriate distance in all directions utilizing iris scissors.  The opposing transposition arms were then transposed into place in opposite direction and anchored with interrupted buried subcutaneous sutures.
Z Plasty Text: The lesion was extirpated to the level of the fat with a #15 scalpel blade.  Given the location of the defect, shape of the defect and the proximity to free margins a Z-plasty was deemed most appropriate for repair.  Using a sterile surgical marker, the appropriate transposition arms of the Z-plasty were drawn incorporating the defect and placing the expected incisions within the relaxed skin tension lines where possible.    The area thus outlined was incised deep to adipose tissue with a #15 scalpel blade.  The skin margins were undermined to an appropriate distance in all directions utilizing iris scissors.  The opposing transposition arms were then transposed into place in opposite direction and anchored with interrupted buried subcutaneous sutures.
Zygomaticofacial Flap Text: Given the location of the defect, shape of the defect and the proximity to free margins a zygomaticofacial flap was deemed most appropriate for repair.  Using a sterile surgical marker, the appropriate flap was drawn incorporating the defect and placing the expected incisions within the relaxed skin tension lines where possible. The area thus outlined was incised deep to adipose tissue with a #15 scalpel blade with preservation of a vascular pedicle.  The skin margins were undermined to an appropriate distance in all directions utilizing iris scissors.  The flap was then placed into the defect and anchored with interrupted buried subcutaneous sutures.
Abbe Flap (Lower To Upper Lip) Text: The defect of the upper lip was assessed and measured.  Given the location and size of the defect, an Abbe flap was deemed most appropriate.  Using a sterile surgical marker, an appropriate Abbe flap was measured and drawn on the lower lip. Local anesthesia was then infiltrated. A scalpel was then used to incise the upper lip through and through the skin, vermilion, muscle and mucosa, leaving the flap pedicled on the opposite side.  The flap was then rotated and transferred to the lower lip defect.  The flap was then sutured into place with a three layer technique, closing the orbicularis oris muscle layer with subcutaneous buried sutures, followed by a mucosal layer and an epidermal layer.
Abbe Flap (Upper To Lower Lip) Text: The defect of the lower lip was assessed and measured.  Given the location and size of the defect, an Abbe flap was deemed most appropriate.  Using a sterile surgical marker, an appropriate Abbe flap was measured and drawn on the upper lip. Local anesthesia was then infiltrated.  A scalpel was then used to incise the upper lip through and through the skin, vermilion, muscle and mucosa, leaving the flap pedicled on the opposite side.  The flap was then rotated and transferred to the lower lip defect.  The flap was then sutured into place with a three layer technique, closing the orbicularis oris muscle layer with subcutaneous buried sutures, followed by a mucosal layer and an epidermal layer.
Cheek Interpolation Flap Text: A decision was made to reconstruct the defect utilizing an interpolation axial flap and a staged reconstruction.  A telfa template was made of the defect.  This telfa template was then used to outline the Cheek Interpolation flap.  The donor area for the pedicle flap was then injected with anesthesia.  The flap was excised through the skin and subcutaneous tissue down to the layer of the underlying musculature.  The interpolation flap was carefully excised within this deep plane to maintain its blood supply.  The edges of the donor site were undermined.   The donor site was closed in a primary fashion.  The pedicle was then rotated into position and sutured.  Once the tube was sutured into place, adequate blood supply was confirmed with blanching and refill.  The pedicle was then wrapped with xeroform gauze and dressed appropriately with a telfa and gauze bandage to ensure continued blood supply and protect the attached pedicle.
Cheek-To-Nose Interpolation Flap Text: A decision was made to reconstruct the defect utilizing an interpolation axial flap and a staged reconstruction.  A telfa template was made of the defect.  This telfa template was then used to outline the Cheek-To-Nose Interpolation flap.  The donor area for the pedicle flap was then injected with anesthesia.  The flap was excised through the skin and subcutaneous tissue down to the layer of the underlying musculature.  The interpolation flap was carefully excised within this deep plane to maintain its blood supply.  The edges of the donor site were undermined.   The donor site was closed in a primary fashion.  The pedicle was then rotated into position and sutured.  Once the tube was sutured into place, adequate blood supply was confirmed with blanching and refill.  The pedicle was then wrapped with xeroform gauze and dressed appropriately with a telfa and gauze bandage to ensure continued blood supply and protect the attached pedicle.
Estlander Flap (Lower To Upper Lip) Text: The defect of the lower lip was assessed and measured.  Given the location and size of the defect, an Estlander flap was deemed most appropriate.  Using a sterile surgical marker, an appropriate Estlander flap was measured and drawn on the upper lip. Local anesthesia was then infiltrated. A scalpel was then used to incise the lateral aspect of the flap, through skin, muscle and mucosa, leaving the flap pedicled medially.  The flap was then rotated and positioned to fill the lower lip defect.  The flap was then sutured into place with a three layer technique, closing the orbicularis oris muscle layer with subcutaneous buried sutures, followed by a mucosal layer and an epidermal layer.
Interpolation Flap Text: A decision was made to reconstruct the defect utilizing an interpolation axial flap and a staged reconstruction.  A telfa template was made of the defect.  This telfa template was then used to outline the interpolation flap.  The donor area for the pedicle flap was then injected with anesthesia.  The flap was excised through the skin and subcutaneous tissue down to the layer of the underlying musculature.  The interpolation flap was carefully excised within this deep plane to maintain its blood supply.  The edges of the donor site were undermined.   The donor site was closed in a primary fashion.  The pedicle was then rotated into position and sutured.  Once the tube was sutured into place, adequate blood supply was confirmed with blanching and refill.  The pedicle was then wrapped with xeroform gauze and dressed appropriately with a telfa and gauze bandage to ensure continued blood supply and protect the attached pedicle.
Melolabial Interpolation Flap Text: A decision was made to reconstruct the defect utilizing an interpolation axial flap and a staged reconstruction.  A telfa template was made of the defect.  This telfa template was then used to outline the melolabial interpolation flap.  The donor area for the pedicle flap was then injected with anesthesia.  The flap was excised through the skin and subcutaneous tissue down to the layer of the underlying musculature.  The pedicle flap was carefully excised within this deep plane to maintain its blood supply.  The edges of the donor site were undermined.   The donor site was closed in a primary fashion.  The pedicle was then rotated into position and sutured.  Once the tube was sutured into place, adequate blood supply was confirmed with blanching and refill.  The pedicle was then wrapped with xeroform gauze and dressed appropriately with a telfa and gauze bandage to ensure continued blood supply and protect the attached pedicle.
Mastoid Interpolation Flap Text: A decision was made to reconstruct the defect utilizing an interpolation axial flap and a staged reconstruction.  A telfa template was made of the defect.  This telfa template was then used to outline the mastoid interpolation flap.  The donor area for the pedicle flap was then injected with anesthesia.  The flap was excised through the skin and subcutaneous tissue down to the layer of the underlying musculature.  The pedicle flap was carefully excised within this deep plane to maintain its blood supply.  The edges of the donor site were undermined.   The donor site was closed in a primary fashion.  The pedicle was then rotated into position and sutured.  Once the tube was sutured into place, adequate blood supply was confirmed with blanching and refill.  The pedicle was then wrapped with xeroform gauze and dressed appropriately with a telfa and gauze bandage to ensure continued blood supply and protect the attached pedicle.
Paramedian Forehead Flap Text: A decision was made to reconstruct the defect utilizing an interpolation axial flap and a staged reconstruction.  A telfa template was made of the defect.  This telfa template was then used to outline the paramedian forehead pedicle flap.  The donor area for the pedicle flap was then injected with anesthesia.  The flap was excised through the skin and subcutaneous tissue down to the layer of the underlying musculature.  The pedicle flap was carefully excised within this deep plane to maintain its blood supply.  The edges of the donor site were undermined.   The donor site was closed in a primary fashion.  The pedicle was then rotated into position and sutured.  Once the tube was sutured into place, adequate blood supply was confirmed with blanching and refill.  The pedicle was then wrapped with xeroform gauze and dressed appropriately with a telfa and gauze bandage to ensure continued blood supply and protect the attached pedicle.
Posterior Auricular Interpolation Flap Text: A decision was made to reconstruct the defect utilizing an interpolation axial flap and a staged reconstruction.  A telfa template was made of the defect.  This telfa template was then used to outline the posterior auricular interpolation flap.  The donor area for the pedicle flap was then injected with anesthesia.  The flap was excised through the skin and subcutaneous tissue down to the layer of the underlying musculature.  The pedicle flap was carefully excised within this deep plane to maintain its blood supply.  The edges of the donor site were undermined.   The donor site was closed in a primary fashion.  The pedicle was then rotated into position and sutured.  Once the tube was sutured into place, adequate blood supply was confirmed with blanching and refill.  The pedicle was then wrapped with xeroform gauze and dressed appropriately with a telfa and gauze bandage to ensure continued blood supply and protect the attached pedicle.
Cheiloplasty (Complex) Text: A decision was made to reconstruct the defect with a  cheiloplasty.  The defect was undermined extensively.  Additional orbicularis oris muscle was excised with a 15 blade scalpel.  The defect was converted into a full thickness wedge to facilite a better cosmetic result.  Small vessels were then tied off with 5-0 monocyrl. The orbicularis oris, superficial fascia, adipose and dermis were then reapproximated.  After the deeper layers were approximated the epidermis was reapproximated with particular care given to realign the vermilion border.
Cheiloplasty (Less Than 50%) Text: A decision was made to reconstruct the defect with a  cheiloplasty.  The defect was undermined extensively.  Additional orbicularis oris muscle was excised with a 15 blade scalpel.  The defect was converted into a full thickness wedge, of less than 50% of the vertical height of the lip, to facilite a better cosmetic result.  Small vessels were then tied off with 5-0 monocyrl. The orbicularis oris, superficial fascia, adipose and dermis were then reapproximated.  After the deeper layers were approximated the epidermis was reapproximated with particular care given to realign the vermilion border.
Ear Wedge Repair Text: A wedge excision was completed by carrying down an excision through the full thickness of the ear and cartilage with an inward facing Burow's triangle. The wound was then closed in a layered fashion.
Full Thickness Lip Wedge Repair (Flap) Text: Given the location of the defect and the proximity to free margins a full thickness wedge repair was deemed most appropriate.  Using a sterile surgical marker, the appropriate repair was drawn incorporating the defect and placing the expected incisions perpendicular to the vermilion border.  The vermilion border was also meticulously outlined to ensure appropriate reapproximation during the repair.  The area thus outlined was incised through and through with a #15 scalpel blade.  The muscularis and dermis were reaproximated with deep sutures following hemostasis. Care was taken to realign the vermilion border before proceeding with the superficial closure.  Once the vermilion was realigned the superfical and mucosal closure was finished.
Burow's Graft Text: The defect edges were debeveled with a #15 scalpel blade.  Given the location of the defect, shape of the defect, the proximity to free margins and the presence of a standing cone deformity a Burow's skin graft was deemed most appropriate. The standing cone was removed and this tissue was then trimmed to the shape of the primary defect. The adipose tissue was also removed until only dermis and epidermis were left.  The skin margins of the secondary defect were undermined to an appropriate distance in all directions utilizing iris scissors.  The secondary defect was closed with interrupted buried subcutaneous sutures.  The skin edges were then re-apposed with running  sutures.  The skin graft was then placed in the primary defect and oriented appropriately.
Cartilage Graft Text: The defect edges were debeveled with a #15 scalpel blade.  Given the location of the defect, shape of the defect, the fact the defect involved a full thickness cartilage defect a cartilage graft was deemed most appropriate.  An appropriate donor site was identified, cleansed, and anesthetized. The cartilage graft was then harvested and transferred to the recipient site, oriented appropriately and then sutured into place.  The secondary defect was then repaired using a primary closure.
Composite Graft Text: The defect edges were debeveled with a #15 scalpel blade.  Given the location of the defect, shape of the defect, the proximity to free margins and the fact the defect was full thickness a composite graft was deemed most appropriate.  The defect was outline and then transferred to the donor site.  A full thickness graft was then excised from the donor site. The graft was then placed in the primary defect, oriented appropriately and then sutured into place.  The secondary defect was then repaired using a primary closure.
Epidermal Autograft Text: The defect edges were debeveled with a #15 scalpel blade.  Given the location of the defect, shape of the defect and the proximity to free margins an epidermal autograft was deemed most appropriate.  Using a sterile surgical marker, the primary defect shape was transferred to the donor site. The epidermal graft was then harvested.  The skin graft was then placed in the primary defect and oriented appropriately.
Dermal Autograft Text: The defect edges were debeveled with a #15 scalpel blade.  Given the location of the defect, shape of the defect and the proximity to free margins a dermal autograft was deemed most appropriate.  Using a sterile surgical marker, the primary defect shape was transferred to the donor site. The area thus outlined was incised deep to adipose tissue with a #15 scalpel blade.  The harvested graft was then trimmed of adipose and epidermal tissue until only dermis was left.  The skin graft was then placed in the primary defect and oriented appropriately.
Ftsg Text: The defect edges were debeveled with a #15 scalpel blade.  Given the location of the defect, shape of the defect and the proximity to free margins a full thickness skin graft was deemed most appropriate.  Using a sterile surgical marker, the primary defect shape was transferred to the donor site. The area thus outlined was incised deep to adipose tissue with a #15 scalpel blade.  The harvested graft was then trimmed of adipose tissue until only dermis and epidermis was left.  The skin margins of the secondary defect were undermined to an appropriate distance in all directions utilizing iris scissors.  The secondary defect was closed with interrupted buried subcutaneous sutures.  The skin edges were then re-apposed with running  sutures.  The skin graft was then placed in the primary defect and oriented appropriately.
Pinch Graft Text: The defect edges were debeveled with a #15 scalpel blade. Given the location of the defect, shape of the defect and the proximity to free margins a pinch graft was deemed most appropriate. Using a sterile surgical marker, the primary defect shape was transferred to the donor site. The area thus outlined was incised deep to adipose tissue with a #15 scalpel blade.  The harvested graft was then trimmed of adipose tissue until only dermis and epidermis was left. The skin graft was then placed in the primary defect and oriented appropriately.
Skin Substitute Text: The defect edges were debeveled with a #15 scalpel blade.  Given the location of the defect, shape of the defect and the proximity to free margins a skin substitute graft was deemed most appropriate.  The graft material was trimmed to fit the size of the defect. The graft was then placed in the primary defect and oriented appropriately.
Split-Thickness Skin Graft Text: The defect edges were debeveled with a #15 scalpel blade.  Given the location of the defect, shape of the defect and the proximity to free margins a split thickness skin graft was deemed most appropriate.  Using a sterile surgical marker, the primary defect shape was transferred to the donor site. The split thickness graft was then harvested.  The skin graft was then placed in the primary defect and oriented appropriately.
Tissue Cultured Epidermal Autograft Text: The defect edges were debeveled with a #15 scalpel blade.  Given the location of the defect, shape of the defect and the proximity to free margins a tissue cultured epidermal autograft was deemed most appropriate.  The graft was then trimmed to fit the size of the defect.  The graft was then placed in the primary defect and oriented appropriately.
Xenograft Text: The defect edges were debeveled with a #15 scalpel blade.  Given the location of the defect, shape of the defect and the proximity to free margins a xenograft was deemed most appropriate.  The graft was then trimmed to fit the size of the defect.  The graft was then placed in the primary defect and oriented appropriately.
Complex Repair And Flap Additional Text (Will Appearing After The Standard Complex Repair Text): The complex repair was not sufficient to completely close the primary defect. The remaining additional defect was repaired with the flap mentioned below.
Complex Repair And Graft Additional Text (Will Appearing After The Standard Complex Repair Text): The complex repair was not sufficient to completely close the primary defect. The remaining additional defect was repaired with the graft mentioned below.
Eyelid Full Thickness Repair - 98753: The eyelid defect was full thickness which required a wedge repair of the eyelid. Special care was taken to ensure that the eyelid margin was realligned when placing sutures.
Eyelid Partial Thickness Repair - 57131: The eyelid defect was partial thickness which required a wedge repair of the eyelid. Special care was taken to ensure that the eyelid margin was realligned when placing sutures.
Intermediate Repair And Flap Additional Text (Will Appearing After The Standard Complex Repair Text): The intermediate repair was not sufficient to completely close the primary defect. The remaining additional defect was repaired with the flap mentioned below.
Intermediate Repair And Graft Additional Text (Will Appearing After The Standard Complex Repair Text): The intermediate repair was not sufficient to completely close the primary defect. The remaining additional defect was repaired with the graft mentioned below.
Localized Dermabrasion With 15 Blade Text: The patient was draped in routine manner.  Localized dermabrasion using a 15 blade was performed in routine manner to papillary dermis. This spot dermabrasion is being performed to complete skin cancer reconstruction. It also will eliminate the other sun damaged precancerous cells that are known to be part of the regional effect of a lifetime's worth of sun exposure. This localized dermabrasion is therapeutic and should not be considered cosmetic in any regard.
Localized Dermabrasion With Sand Papertext: The patient was draped in routine manner.  Localized dermabrasion using sterile sand paper was performed in routine manner to papillary dermis. This spot dermabrasion is being performed to complete skin cancer reconstruction. It also will eliminate the other sun damaged precancerous cells that are known to be part of the regional effect of a lifetime's worth of sun exposure. This localized dermabrasion is therapeutic and should not be considered cosmetic in any regard.
Localized Dermabrasion With Wire Brush Text: The patient was draped in routine manner.  Localized dermabrasion using 3 x 17 mm wire brush was performed in routine manner to papillary dermis. This spot dermabrasion is being performed to complete skin cancer reconstruction. It also will eliminate the other sun damaged precancerous cells that are known to be part of the regional effect of a lifetime's worth of sun exposure. This localized dermabrasion is therapeutic and should not be considered cosmetic in any regard.
Purse String (Simple) Text: Given the location of the defect and the characteristics of the surrounding skin a purse string closure was deemed most appropriate.  Undermining was performed circumferentially around the surgical defect.  A purse string suture was then placed and tightened.
Purse String (Intermediate) Text: Given the location of the defect and the characteristics of the surrounding skin a purse string intermediate closure was deemed most appropriate.  Undermining was performed circumferentially around the surgical defect.  A purse string suture was then placed and tightened.
Partial Purse String (Simple) Text: Given the location of the defect and the characteristics of the surrounding skin a simple purse string closure was deemed most appropriate.  Undermining was performed circumferentially around the surgical defect.  A purse string suture was then placed and tightened. Wound tension only allowed a partial closure of the circular defect.
Partial Purse String (Intermediate) Text: Given the location of the defect and the characteristics of the surrounding skin an intermediate purse string closure was deemed most appropriate.  Undermining was performed circumferentially around the surgical defect.  A purse string suture was then placed and tightened. Wound tension only allowed a partial closure of the circular defect.
Tarsorrhaphy Text: A tarsorrhaphy was performed using Frost sutures.
Manual Repair Warning Statement: We plan on removing the manually selected variable below in favor of our much easier automatic structured text blocks found in the previous tab. We decided to do this to help make the flow better and give you the full power of structured data. Manual selection is never going to be ideal in our platform and I would encourage you to avoid using manual selection from this point on, especially since I will be sunsetting this feature. It is important that you do one of two things with the customized text below. First, you can save all of the text in a word file so you can have it for future reference. Second, transfer the text to the appropriate area in the Library tab. Lastly, if there is a flap or graft type which we do not have you need to let us know right away so I can add it in before the variable is hidden. No need to panic, we plan to give you roughly 6 months to make the change.
Same Histology In Subsequent Stages Text: The pattern and morphology of the tumor is as described in the first stage.
No Residual Tumor Seen Histology Text: There were no malignant cells seen in the sections examined.
Inflammation Suggestive Of Cancer Camouflage Histology Text: There was a dense lymphocytic infiltrate which prevented adequate histologic evaluation of adjacent structures.
Bcc Histology Text: There were numerous aggregates of basaloid cells.
Bcc Infiltrative Histology Text: There were numerous aggregates of basaloid cells demonstrating an infiltrative pattern.
Mart-1 - Positive Histology Text: MART-1 staining demonstrates areas of higher density and clustering of melanocytes with Pagetoid spread upwards within the epidermis. The surgical margins are positive for tumor cells.
Mart-1 - Negative Histology Text: MART-1 staining demonstrates a normal density and pattern of melanocytes along the dermal-epidermal junction. The surgical margins are negative for tumor cells.
Information: Selecting Yes will display possible errors in your note based on the variables you have selected. This validation is only offered as a suggestion for you. PLEASE NOTE THAT THE VALIDATION TEXT WILL BE REMOVED WHEN YOU FINALIZE YOUR NOTE. IF YOU WANT TO FAX A PRELIMINARY NOTE YOU WILL NEED TO TOGGLE THIS TO 'NO' IF YOU DO NOT WANT IT IN YOUR FAXED NOTE.
Bill 59 Modifier?: No - Continue to Bill 79 Modifier

## 2024-07-23 ENCOUNTER — APPOINTMENT (RX ONLY)
Dept: URBAN - METROPOLITAN AREA CLINIC 140 | Facility: CLINIC | Age: 89
Setting detail: DERMATOLOGY
End: 2024-07-23

## 2024-07-23 PROBLEM — D03.61 MELANOMA IN SITU OF RIGHT UPPER LIMB, INCLUDING SHOULDER: Status: ACTIVE | Noted: 2024-07-23

## 2024-07-23 PROCEDURE — ? EXCISION

## 2024-07-23 PROCEDURE — 11602 EXC TR-EXT MAL+MARG 1.1-2 CM: CPT | Mod: 79

## 2024-07-23 PROCEDURE — 13121 CMPLX RPR S/A/L 2.6-7.5 CM: CPT | Mod: 79

## 2024-07-23 NOTE — PROCEDURE: EXCISION

## 2024-08-09 ENCOUNTER — APPOINTMENT (RX ONLY)
Dept: URBAN - METROPOLITAN AREA CLINIC 140 | Facility: CLINIC | Age: 89
Setting detail: DERMATOLOGY
End: 2024-08-09

## 2024-08-09 DIAGNOSIS — Z48.02 ENCOUNTER FOR REMOVAL OF SUTURES: ICD-10-CM

## 2024-08-09 PROCEDURE — ? SUTURE REMOVAL (GLOBAL PERIOD)

## 2024-08-09 ASSESSMENT — LOCATION SIMPLE DESCRIPTION DERM: LOCATION SIMPLE: RIGHT SHOULDER

## 2024-08-09 ASSESSMENT — LOCATION ZONE DERM: LOCATION ZONE: ARM

## 2024-08-09 ASSESSMENT — LOCATION DETAILED DESCRIPTION DERM: LOCATION DETAILED: RIGHT POSTERIOR SHOULDER

## 2024-08-09 NOTE — PROCEDURE: SUTURE REMOVAL (GLOBAL PERIOD)
Detail Level: Detailed
Add 06641 Cpt? (Important Note: In 2017 The Use Of 69252 Is Being Tracked By Cms To Determine Future Global Period Reimbursement For Global Periods): yes

## 2024-09-10 ENCOUNTER — OFFICE VISIT (OUTPATIENT)
Dept: URBAN - METROPOLITAN AREA CLINIC 15 | Facility: CLINIC | Age: 89
End: 2024-09-10
Payer: COMMERCIAL

## 2024-09-10 DIAGNOSIS — Z96.1 PRESENCE OF PSEUDOPHAKIA: ICD-10-CM

## 2024-09-10 DIAGNOSIS — H00.12 CHALAZION RIGHT LOWER EYELID: Primary | ICD-10-CM

## 2024-09-10 DIAGNOSIS — H02.9 UNSPECIFIED DISORDER OF EYELID: ICD-10-CM

## 2024-09-10 PROCEDURE — 99204 OFFICE O/P NEW MOD 45 MIN: CPT

## 2024-09-10 ASSESSMENT — INTRAOCULAR PRESSURE
OD: 16
OS: 16

## 2024-09-24 ENCOUNTER — OFFICE VISIT (OUTPATIENT)
Dept: URBAN - METROPOLITAN AREA CLINIC 51 | Facility: CLINIC | Age: 89
End: 2024-09-24
Payer: COMMERCIAL

## 2024-09-24 DIAGNOSIS — H00.12 CHALAZION RIGHT LOWER EYELID: Primary | ICD-10-CM

## 2024-09-24 DIAGNOSIS — D48.19 OTH NEOPLASM OF UNCERTAIN BEHAVIOR OF CONNCTV/SOFT TISS: ICD-10-CM

## 2024-09-24 DIAGNOSIS — H02.9 UNSPECIFIED DISORDER OF EYELID: ICD-10-CM

## 2024-09-24 PROCEDURE — 99203 OFFICE O/P NEW LOW 30 MIN: CPT | Performed by: OPHTHALMOLOGY

## 2024-09-24 PROCEDURE — 92285 EXTERNAL OCULAR PHOTOGRAPHY: CPT | Performed by: OPHTHALMOLOGY

## 2024-10-03 NOTE — PROGRESS NOTES
Printed vaccine record waiting for PCP to sign    Renal Dosing/Monitoring  Medication: Pepcid PO   Current regimen:  20mg every 12 hr  Recent Labs     06/24/22  1438   CREA 0.47*   BUN 15     Estimated CrCl:  48 ml/min  Plan: Change to 20mg q24h with respect to indication and renal status (CrCl <50 mL/min) per [facility]-approved Renal Dosing Adjustment protocol. Pharmacy will continue to monitor this patient daily for changes in clinical status and renal function.     **close after initial review

## 2024-10-10 ENCOUNTER — OFFICE VISIT (OUTPATIENT)
Dept: URBAN - METROPOLITAN AREA CLINIC 15 | Facility: CLINIC | Age: 89
End: 2024-10-10
Payer: COMMERCIAL

## 2024-10-10 DIAGNOSIS — H52.223 REGULAR ASTIGMATISM, BILATERAL: ICD-10-CM

## 2024-10-10 DIAGNOSIS — D48.19 OTH NEOPLASM OF UNCERTAIN BEHAVIOR OF CONNCTV/SOFT TISS: Primary | ICD-10-CM

## 2024-10-10 DIAGNOSIS — H00.12 CHALAZION RIGHT LOWER EYELID: ICD-10-CM

## 2024-10-10 DIAGNOSIS — H43.813 BILATERAL VITREOUS DETACHMENT OF EYES: ICD-10-CM

## 2024-10-10 DIAGNOSIS — Z96.1 PRESENCE OF PSEUDOPHAKIA: ICD-10-CM

## 2024-10-10 DIAGNOSIS — H02.411 MECHANICAL PTOSIS OF RIGHT EYELID: ICD-10-CM

## 2024-10-10 DIAGNOSIS — H16.223 KERATOCONJUNCTIVITIS SICCA, NOT SPECIFIED AS SJ”GREN'S, BILATERAL: ICD-10-CM

## 2024-10-10 PROCEDURE — 99213 OFFICE O/P EST LOW 20 MIN: CPT

## 2024-10-10 PROCEDURE — 92134 CPTRZ OPH DX IMG PST SGM RTA: CPT

## 2024-10-10 ASSESSMENT — VISUAL ACUITY
OS: 20/20
OD: 20/20

## 2024-10-10 ASSESSMENT — INTRAOCULAR PRESSURE
OS: 11
OD: 11

## 2024-10-24 ENCOUNTER — OFFICE VISIT (OUTPATIENT)
Dept: URBAN - METROPOLITAN AREA CLINIC 44 | Facility: CLINIC | Age: 89
End: 2024-10-24
Payer: COMMERCIAL

## 2024-10-24 DIAGNOSIS — D48.19 OTH NEOPLASM OF UNCERTAIN BEHAVIOR OF CONNCTV/SOFT TISS: Primary | ICD-10-CM

## 2024-10-24 DIAGNOSIS — H00.12 CHALAZION RIGHT LOWER EYELID: ICD-10-CM

## 2024-10-24 PROCEDURE — 99203 OFFICE O/P NEW LOW 30 MIN: CPT

## 2024-10-24 PROCEDURE — 67840 REMOVE EYELID LESION: CPT

## 2024-10-24 PROCEDURE — 92285 EXTERNAL OCULAR PHOTOGRAPHY: CPT

## 2024-10-24 RX ORDER — NEOMYCIN SULFATE, POLYMYXIN B SULFATE AND DEXAMETHASONE 3.5; 10000; 1 MG/G; [USP'U]/G; MG/G
OINTMENT OPHTHALMIC
Qty: 3.5 | Refills: 1 | Status: ACTIVE
Start: 2024-10-24

## 2024-11-13 ENCOUNTER — APPOINTMENT (RX ONLY)
Dept: URBAN - METROPOLITAN AREA CLINIC 140 | Facility: CLINIC | Age: 89
Setting detail: DERMATOLOGY
End: 2024-11-13

## 2024-11-13 DIAGNOSIS — D485 NEOPLASM OF UNCERTAIN BEHAVIOR OF SKIN: ICD-10-CM

## 2024-11-13 DIAGNOSIS — L30.9 DERMATITIS, UNSPECIFIED: ICD-10-CM

## 2024-11-13 DIAGNOSIS — Z85.820 PERSONAL HISTORY OF MALIGNANT MELANOMA OF SKIN: ICD-10-CM

## 2024-11-13 DIAGNOSIS — L82.1 OTHER SEBORRHEIC KERATOSIS: ICD-10-CM

## 2024-11-13 DIAGNOSIS — Z85.828 PERSONAL HISTORY OF OTHER MALIGNANT NEOPLASM OF SKIN: ICD-10-CM

## 2024-11-13 DIAGNOSIS — D22 MELANOCYTIC NEVI: ICD-10-CM

## 2024-11-13 DIAGNOSIS — L57.0 ACTINIC KERATOSIS: ICD-10-CM

## 2024-11-13 DIAGNOSIS — D18.0 HEMANGIOMA: ICD-10-CM

## 2024-11-13 DIAGNOSIS — L81.4 OTHER MELANIN HYPERPIGMENTATION: ICD-10-CM

## 2024-11-13 DIAGNOSIS — L57.8 OTHER SKIN CHANGES DUE TO CHRONIC EXPOSURE TO NONIONIZING RADIATION: ICD-10-CM

## 2024-11-13 PROBLEM — D22.62 MELANOCYTIC NEVI OF LEFT UPPER LIMB, INCLUDING SHOULDER: Status: ACTIVE | Noted: 2024-11-13

## 2024-11-13 PROBLEM — D18.01 HEMANGIOMA OF SKIN AND SUBCUTANEOUS TISSUE: Status: ACTIVE | Noted: 2024-11-13

## 2024-11-13 PROBLEM — D48.5 NEOPLASM OF UNCERTAIN BEHAVIOR OF SKIN: Status: ACTIVE | Noted: 2024-11-13

## 2024-11-13 PROBLEM — D22.5 MELANOCYTIC NEVI OF TRUNK: Status: ACTIVE | Noted: 2024-11-13

## 2024-11-13 PROCEDURE — 11102 TANGNTL BX SKIN SINGLE LES: CPT

## 2024-11-13 PROCEDURE — ? LIQUID NITROGEN

## 2024-11-13 PROCEDURE — ? BIOPSY BY SHAVE METHOD

## 2024-11-13 PROCEDURE — 17000 DESTRUCT PREMALG LESION: CPT | Mod: 59

## 2024-11-13 PROCEDURE — ? COUNSELING

## 2024-11-13 PROCEDURE — 11103 TANGNTL BX SKIN EA SEP/ADDL: CPT

## 2024-11-13 PROCEDURE — ? PRESCRIPTION

## 2024-11-13 PROCEDURE — 17003 DESTRUCT PREMALG LES 2-14: CPT

## 2024-11-13 PROCEDURE — 99214 OFFICE O/P EST MOD 30 MIN: CPT | Mod: 25

## 2024-11-13 PROCEDURE — ? SUNSCREEN RECOMMENDATIONS

## 2024-11-13 RX ORDER — TRIAMCINOLONE ACETONIDE 1 MG/G
CREAM TOPICAL
Qty: 454 | Refills: 3 | Status: ERX | COMMUNITY
Start: 2024-11-13

## 2024-11-13 RX ADMIN — TRIAMCINOLONE ACETONIDE: 1 CREAM TOPICAL at 00:00

## 2024-11-13 ASSESSMENT — LOCATION ZONE DERM
LOCATION ZONE: LEG
LOCATION ZONE: TRUNK
LOCATION ZONE: ARM
LOCATION ZONE: EAR
LOCATION ZONE: HAND
LOCATION ZONE: FACE
LOCATION ZONE: NECK

## 2024-11-13 ASSESSMENT — LOCATION DETAILED DESCRIPTION DERM
LOCATION DETAILED: LEFT INFERIOR UPPER BACK
LOCATION DETAILED: RIGHT SUPERIOR MEDIAL UPPER BACK
LOCATION DETAILED: RIGHT SUPERIOR HELIX
LOCATION DETAILED: LEFT DISTAL PRETIBIAL REGION
LOCATION DETAILED: RIGHT DISTAL LATERAL POSTERIOR UPPER ARM
LOCATION DETAILED: RIGHT DISTAL DORSAL FOREARM
LOCATION DETAILED: LEFT SUPERIOR HELIX
LOCATION DETAILED: RIGHT MID-UPPER BACK
LOCATION DETAILED: RIGHT PROXIMAL DORSAL FOREARM
LOCATION DETAILED: LEFT PROXIMAL DORSAL FOREARM
LOCATION DETAILED: RIGHT DISTAL PRETIBIAL REGION
LOCATION DETAILED: SUPERIOR MID FOREHEAD
LOCATION DETAILED: LEFT DISTAL DORSAL FOREARM
LOCATION DETAILED: LEFT LATERAL UPPER BACK
LOCATION DETAILED: LEFT CENTRAL MALAR CHEEK
LOCATION DETAILED: RIGHT SUPERIOR POSTERIOR NECK
LOCATION DETAILED: LEFT RADIAL DORSAL HAND
LOCATION DETAILED: RIGHT SUPERIOR UPPER BACK
LOCATION DETAILED: PERIUMBILICAL SKIN
LOCATION DETAILED: RIGHT DORSAL WRIST
LOCATION DETAILED: LEFT ANTERIOR PROXIMAL UPPER ARM

## 2024-11-13 ASSESSMENT — LOCATION SIMPLE DESCRIPTION DERM
LOCATION SIMPLE: LEFT CHEEK
LOCATION SIMPLE: RIGHT EAR
LOCATION SIMPLE: LEFT EAR
LOCATION SIMPLE: SUPERIOR FOREHEAD
LOCATION SIMPLE: RIGHT WRIST
LOCATION SIMPLE: RIGHT PRETIBIAL REGION
LOCATION SIMPLE: LEFT FOREARM
LOCATION SIMPLE: LEFT PRETIBIAL REGION
LOCATION SIMPLE: ABDOMEN
LOCATION SIMPLE: LEFT UPPER ARM
LOCATION SIMPLE: RIGHT UPPER ARM
LOCATION SIMPLE: LEFT UPPER BACK
LOCATION SIMPLE: RIGHT FOREARM
LOCATION SIMPLE: POSTERIOR NECK
LOCATION SIMPLE: LEFT HAND
LOCATION SIMPLE: RIGHT UPPER BACK

## 2024-11-13 NOTE — PROCEDURE: LIQUID NITROGEN
Show Applicator Variable?: Yes
Duration Of Freeze Thaw-Cycle (Seconds): 5
Post-Care Instructions: I reviewed with the patient in detail post-care instructions. Patient is to wear sunprotection, and avoid picking at any of the treated lesions. Pt may apply Vaseline to crusted or scabbing areas.
Render Note In Bullet Format When Appropriate: No
Consent: The patient's consent was obtained including but not limited to risks of crusting, scabbing, blistering, scarring, darker or lighter pigmentary change, recurrence, incomplete removal and infection.
Detail Level: Detailed
Number Of Freeze-Thaw Cycles: 3 freeze-thaw cycles

## 2024-11-13 NOTE — PROCEDURE: COUNSELING
Detail Level: Detailed
Sunscreen Recommendations: Recommend SPF>30 daily with reapplication every 2 hours.
Patient Specific Counseling (Will Not Stick From Patient To Patient): Suspect eczematous process given pt reported symptoms and clinical exam. Will start TAC 0.1% cream BID x 1-2 weeks, PRN for flares. Sensitive skin care regimen reviewed. RTC in 3-4 weeks, sooner if worsening. Discussed potential for biopsy to aid in diagnosis if not improved.
Detail Level: Zone
Detail Level: Simple

## 2025-01-23 ENCOUNTER — APPOINTMENT (OUTPATIENT)
Dept: URBAN - METROPOLITAN AREA CLINIC 140 | Facility: CLINIC | Age: OVER 89
Setting detail: DERMATOLOGY
End: 2025-01-23

## 2025-01-23 DIAGNOSIS — Z85.820 PERSONAL HISTORY OF MALIGNANT MELANOMA OF SKIN: ICD-10-CM

## 2025-01-23 DIAGNOSIS — D22 MELANOCYTIC NEVI: ICD-10-CM

## 2025-01-23 DIAGNOSIS — D485 NEOPLASM OF UNCERTAIN BEHAVIOR OF SKIN: ICD-10-CM

## 2025-01-23 DIAGNOSIS — Z85.828 PERSONAL HISTORY OF OTHER MALIGNANT NEOPLASM OF SKIN: ICD-10-CM

## 2025-01-23 DIAGNOSIS — L81.4 OTHER MELANIN HYPERPIGMENTATION: ICD-10-CM

## 2025-01-23 DIAGNOSIS — L82.1 OTHER SEBORRHEIC KERATOSIS: ICD-10-CM

## 2025-01-23 DIAGNOSIS — D18.0 HEMANGIOMA: ICD-10-CM

## 2025-01-23 DIAGNOSIS — L57.0 ACTINIC KERATOSIS: ICD-10-CM

## 2025-01-23 DIAGNOSIS — L57.8 OTHER SKIN CHANGES DUE TO CHRONIC EXPOSURE TO NONIONIZING RADIATION: ICD-10-CM

## 2025-01-23 PROBLEM — D22.71 MELANOCYTIC NEVI OF RIGHT LOWER LIMB, INCLUDING HIP: Status: ACTIVE | Noted: 2025-01-23

## 2025-01-23 PROBLEM — D18.01 HEMANGIOMA OF SKIN AND SUBCUTANEOUS TISSUE: Status: ACTIVE | Noted: 2025-01-23

## 2025-01-23 PROBLEM — D22.62 MELANOCYTIC NEVI OF LEFT UPPER LIMB, INCLUDING SHOULDER: Status: ACTIVE | Noted: 2025-01-23

## 2025-01-23 PROBLEM — D22.5 MELANOCYTIC NEVI OF TRUNK: Status: ACTIVE | Noted: 2025-01-23

## 2025-01-23 PROBLEM — D48.5 NEOPLASM OF UNCERTAIN BEHAVIOR OF SKIN: Status: ACTIVE | Noted: 2025-01-23

## 2025-01-23 PROCEDURE — 99213 OFFICE O/P EST LOW 20 MIN: CPT | Mod: 25

## 2025-01-23 PROCEDURE — ? SUNSCREEN RECOMMENDATIONS

## 2025-01-23 PROCEDURE — ? COUNSELING

## 2025-01-23 PROCEDURE — ? LIQUID NITROGEN

## 2025-01-23 PROCEDURE — 11102 TANGNTL BX SKIN SINGLE LES: CPT | Mod: 59

## 2025-01-23 PROCEDURE — ? BIOPSY BY SHAVE METHOD

## 2025-01-23 PROCEDURE — 11103 TANGNTL BX SKIN EA SEP/ADDL: CPT

## 2025-01-23 PROCEDURE — 17004 DESTROY PREMAL LESIONS 15/>: CPT

## 2025-01-23 ASSESSMENT — LOCATION SIMPLE DESCRIPTION DERM
LOCATION SIMPLE: LEFT FOREHEAD
LOCATION SIMPLE: RIGHT UPPER BACK
LOCATION SIMPLE: RIGHT TEMPLE
LOCATION SIMPLE: RIGHT ZYGOMA
LOCATION SIMPLE: RIGHT EAR
LOCATION SIMPLE: LEFT UPPER ARM
LOCATION SIMPLE: RIGHT FOREHEAD
LOCATION SIMPLE: LEFT UPPER BACK
LOCATION SIMPLE: RIGHT FOREARM
LOCATION SIMPLE: LEFT CHEEK
LOCATION SIMPLE: SUPERIOR FOREHEAD
LOCATION SIMPLE: RIGHT THIGH
LOCATION SIMPLE: LEFT SCALP
LOCATION SIMPLE: LEFT EAR
LOCATION SIMPLE: ABDOMEN
LOCATION SIMPLE: LEFT FOREARM
LOCATION SIMPLE: LEFT HAND

## 2025-01-23 ASSESSMENT — LOCATION DETAILED DESCRIPTION DERM
LOCATION DETAILED: LEFT SUPERIOR UPPER BACK
LOCATION DETAILED: LEFT PROXIMAL DORSAL FOREARM
LOCATION DETAILED: PERIUMBILICAL SKIN
LOCATION DETAILED: LEFT RADIAL DORSAL HAND
LOCATION DETAILED: LEFT DISTAL DORSAL FOREARM
LOCATION DETAILED: RIGHT SUPERIOR FOREHEAD
LOCATION DETAILED: LEFT ANTERIOR PROXIMAL UPPER ARM
LOCATION DETAILED: RIGHT CENTRAL ZYGOMA
LOCATION DETAILED: RIGHT INFERIOR HELIX
LOCATION DETAILED: RIGHT MEDIAL TEMPLE
LOCATION DETAILED: LEFT SUPERIOR FOREHEAD
LOCATION DETAILED: LEFT ANTIHELIX
LOCATION DETAILED: LEFT CENTRAL FRONTAL SCALP
LOCATION DETAILED: LEFT INFERIOR UPPER BACK
LOCATION DETAILED: RIGHT MID-UPPER BACK
LOCATION DETAILED: RIGHT SUPERIOR MEDIAL UPPER BACK
LOCATION DETAILED: RIGHT MEDIAL UPPER BACK
LOCATION DETAILED: SUPERIOR MID FOREHEAD
LOCATION DETAILED: RIGHT DISTAL DORSAL FOREARM
LOCATION DETAILED: RIGHT ANTERIOR PROXIMAL THIGH
LOCATION DETAILED: LEFT CENTRAL MALAR CHEEK
LOCATION DETAILED: RIGHT PROXIMAL DORSAL FOREARM

## 2025-01-23 ASSESSMENT — LOCATION ZONE DERM
LOCATION ZONE: SCALP
LOCATION ZONE: ARM
LOCATION ZONE: HAND
LOCATION ZONE: FACE
LOCATION ZONE: TRUNK
LOCATION ZONE: LEG
LOCATION ZONE: EAR

## 2025-01-23 NOTE — PROCEDURE: LIQUID NITROGEN
Detail Level: Detailed
Number Of Freeze-Thaw Cycles: 3 freeze-thaw cycles
Render Post-Care Instructions In Note?: no
Consent: The patient's consent was obtained including but not limited to risks of crusting, scabbing, blistering, scarring, darker or lighter pigmentary change, recurrence, incomplete removal and infection.
Show Aperture Variable?: Yes
Duration Of Freeze Thaw-Cycle (Seconds): 5
Post-Care Instructions: I reviewed with the patient in detail post-care instructions. Patient is to wear sunprotection, and avoid picking at any of the treated lesions. Pt may apply Vaseline to crusted or scabbing areas.

## 2025-04-01 ENCOUNTER — APPOINTMENT (OUTPATIENT)
Dept: URBAN - METROPOLITAN AREA CLINIC 140 | Facility: CLINIC | Age: OVER 89
Setting detail: DERMATOLOGY
End: 2025-04-01

## 2025-04-01 DIAGNOSIS — L57.0 ACTINIC KERATOSIS: ICD-10-CM

## 2025-04-01 PROCEDURE — 17000 DESTRUCT PREMALG LESION: CPT

## 2025-04-01 PROCEDURE — ? LIQUID NITROGEN

## 2025-04-01 ASSESSMENT — LOCATION DETAILED DESCRIPTION DERM: LOCATION DETAILED: LEFT SUPERIOR UPPER BACK

## 2025-04-01 ASSESSMENT — LOCATION ZONE DERM: LOCATION ZONE: TRUNK

## 2025-04-01 ASSESSMENT — LOCATION SIMPLE DESCRIPTION DERM: LOCATION SIMPLE: LEFT UPPER BACK

## 2025-05-27 ENCOUNTER — APPOINTMENT (OUTPATIENT)
Dept: URBAN - METROPOLITAN AREA CLINIC 141 | Facility: CLINIC | Age: OVER 89
Setting detail: DERMATOLOGY
End: 2025-05-27

## 2025-05-27 DIAGNOSIS — L57.0 ACTINIC KERATOSIS: ICD-10-CM

## 2025-05-27 DIAGNOSIS — L57.8 OTHER SKIN CHANGES DUE TO CHRONIC EXPOSURE TO NONIONIZING RADIATION: ICD-10-CM

## 2025-05-27 PROCEDURE — ? COUNSELING

## 2025-05-27 PROCEDURE — ? LIQUID NITROGEN

## 2025-05-27 PROCEDURE — 99213 OFFICE O/P EST LOW 20 MIN: CPT | Mod: 25

## 2025-05-27 PROCEDURE — 17000 DESTRUCT PREMALG LESION: CPT

## 2025-05-27 PROCEDURE — 17003 DESTRUCT PREMALG LES 2-14: CPT

## 2025-05-27 ASSESSMENT — LOCATION SIMPLE DESCRIPTION DERM
LOCATION SIMPLE: LEFT EAR
LOCATION SIMPLE: SCALP

## 2025-05-27 ASSESSMENT — LOCATION DETAILED DESCRIPTION DERM
LOCATION DETAILED: LEFT CENTRAL FRONTAL SCALP
LOCATION DETAILED: LEFT SUPERIOR HELIX

## 2025-05-27 ASSESSMENT — LOCATION ZONE DERM
LOCATION ZONE: EAR
LOCATION ZONE: SCALP

## 2025-05-27 NOTE — PROCEDURE: LIQUID NITROGEN
Render Note In Bullet Format When Appropriate: No
Application Tool (Optional): Liquid Nitrogen Sprayer
Show Aperture Variable?: Yes
Consent: The patient's consent was obtained including but not limited to risks of crusting, scabbing, blistering, scarring, darker or lighter pigmentary change, recurrence, incomplete removal and infection.
Number Of Freeze-Thaw Cycles: 1 freeze-thaw cycle
Detail Level: Detailed
Duration Of Freeze Thaw-Cycle (Seconds): 3
Post-Care Instructions: I reviewed with the patient in detail post-care instructions. Patient is to wear sunprotection, and avoid picking at any of the treated lesions. Pt may apply Vaseline to crusted or scabbing areas.